# Patient Record
Sex: FEMALE | Race: WHITE | Employment: PART TIME | URBAN - METROPOLITAN AREA
[De-identification: names, ages, dates, MRNs, and addresses within clinical notes are randomized per-mention and may not be internally consistent; named-entity substitution may affect disease eponyms.]

---

## 2020-02-25 ENCOUNTER — OFFICE VISIT (OUTPATIENT)
Dept: SURGERY | Age: 30
End: 2020-02-25

## 2020-02-25 VITALS
BODY MASS INDEX: 47.99 KG/M2 | HEIGHT: 62 IN | SYSTOLIC BLOOD PRESSURE: 113 MMHG | TEMPERATURE: 98.3 F | WEIGHT: 260.8 LBS | HEART RATE: 93 BPM | DIASTOLIC BLOOD PRESSURE: 77 MMHG | OXYGEN SATURATION: 95 %

## 2020-02-25 DIAGNOSIS — E28.2 POLYCYSTIC OVARIAN DISEASE: ICD-10-CM

## 2020-02-25 DIAGNOSIS — G47.33 OBSTRUCTIVE SLEEP APNEA (ADULT) (PEDIATRIC): ICD-10-CM

## 2020-02-25 DIAGNOSIS — E66.01 OBESITY, MORBID (HCC): Primary | ICD-10-CM

## 2020-02-25 RX ORDER — METFORMIN HYDROCHLORIDE 500 MG/1
TABLET ORAL 2 TIMES DAILY WITH MEALS
COMMUNITY
End: 2020-11-24

## 2020-02-25 NOTE — PROGRESS NOTES
tial Consultation for Bariatric Surgery Template     Warren Payne is a 82-year-old white female who presents for surgical evaluation regard to definitive surgical management of her clinically severe obesity. Onset obesity: Childhood  Weight at age 27: 180 pounds on a 5 foot 20 frame. Maximum weight/current weight: 261 pounds on a 5 foot 2 inch frame with a body mass index of 48  Pattern/progression of weight gain: Slowly progressive interrupted by dietary weight loss followed by regain of the lost weight as well as additional weight thus exhibiting the yoyo effect after current maximum weight of 261 pounds. Maximum medical weight loss attempts: Multiple unsupervised and supervised weight loss trials with maximal loss occurring 2017 losing 20 pounds over a 3-month interval  Comorbidities: Clinical obstructive sleep apnea, carpal tunnel syndrome, polycystic ovarian syndrome, weight related arthropathy-back, knees  Current weight: 261. Body mass index 48. Ideal body weight: 125 pounds  Excess body weight 136 pounds  Postsurgical weight loss based on 80% loss of excess body weight: 107. Postsurgical goal weight: 154 pounds  Allergies: No known drug allergies. Current medications: See medication list  Past medical history:  1. Clinically severe obesity with body mass index of 48 and obese related comorbidities of clinical obstructive sleep apnea, carpal tunnel syndrome, polycystic ovarian syndrome, and weight related arthropathy-back, knees  2. History of gestational diabetes  Past surgical history:  1. Tonsillectomy 2018. 2.   section 2016. Social history:  Denies utilization of tobacco.  Alcohol: 1 ounce weekly  Family history:   Mother 52-clinically severe obesity, adult onset diabetes mellitus  Father 53-history of abdominal malignancy of unknown type  Sister 22, 28-clinically severe obesity  Brother 30-clinically severe obesity    Allergies not on file    Current Outpatient Medications Medication Sig Dispense Refill    metFORMIN (GLUCOPHAGE) 500 mg tablet Take  by mouth two (2) times daily (with meals). History reviewed. No pertinent past medical history. Past Surgical History:   Procedure Laterality Date    HX  SECTION      HX TONSIL AND ADENOIDECTOMY         Social History     Tobacco Use    Smoking status: Never Smoker    Smokeless tobacco: Never Used   Substance Use Topics    Alcohol use: Yes     Comment: socially    Drug use: Never       Family History   Problem Relation Age of Onset    Diabetes Mother    Hugo Leodan Cancer Father        Family Status   Relation Name Status    Mother  Alive    Father  Alive        stomach    Sister  Alive    Brother  Alive    Sister  Alive       Review of Systems:      General: Denies fevers, chills, night sweats, fatigue, weight loss, or weight gain.     HEENT: Denies changes in auditory or visual acuity, recurrent pharyngitis, epistaxis, chronic rhinorrhea, vertigo    Respiratory: Denies increasing shortness of breath, productive cough, hemoptysis    Cardiac: Denies known history of cardiac disease, heart murmur, palpitations    GI: Denies dysphagia, recurrent emesis, hematemesis, changes in bowel habits, hematochezia, melena    : Denies hematuria frequency urgency dysuria    Musculoskeletal: Denies fractures, dislocations    Neurologic: Denies history of CVA, paralysis paresthesias, recurrent cephalgia, seizures    Endocrine: Denies polyuria, polydipsia, polyphagia, heat and cold intolerance    Lymph/heme: Denies a history of malignancy, anemia, bruising, blood transfusions    Integumentary: Negative for dermatitis         Physical Exam    Visit Vitals  /77 (BP 1 Location: Left arm, BP Patient Position: Sitting)   Pulse 93   Temp 98.3 °F (36.8 °C)   Ht 5' 2\" (1.575 m)   Wt 118.3 kg (260 lb 12.8 oz)   LMP 2019 (Exact Date)   SpO2 95%   BMI 47.70 kg/m²       Pre op weight: 260  EBW: 135  Wt loss to date: -0.8     General: Clinically severely obese in no acute distress, nontoxic in appearance. Head: Normocephalic, atraumatic  Mouth: Clear, no overt lesions, oral mucosa is pink and moist.  Neck: Supple, no masses, no adenopathy or carotid bruits, trachea midline  Resp: Clear to auscultation bilaterally, no wheezing, rhonchi, or rales, excursions normal and symmetrical.  Cardio: Regular rate and rhythm, no murmurs, clicks, gallops, or rubs. Abdomen: Obese, soft, nontender, nondistended, normoactive bowel sounds, no hernias. Extremities: Warm, well perfused, no tenderness or swelling, normal gait/station, without edema or varicosities  Neuro: Sensation and strength grossly intact and symmetrical.  Psych: Alert and oriented to person, place, and time. Impression:       82-year-old white female with a body mass index of 48 and obese related comorbidities clinical obstructive sleep apnea, polycystic ovarian syndrome, carpal tunnel syndrome, weight related arthropathy-back, knees who would benefit from bariatric surgery. We have had an extensive discussion with regard to the risks, benefits and likely outcomes of the operation. We've discussed the restrictive and malabsorptive nature of the gastric bypass and compared and contrasted with the sleeve gastrectomy. The patient understands the likelihood of losing approximately 80% of their excess weight in 12 to 18 months. The patient also understands the risks including but not limited to bleeding, infection, need for reoperation, ulcers, leaks and strictures, bowel obstruction secondary to adhesions and internal hernias, DVT, PE, heart attack, stroke, and death. Patient also understands risks of inadequate weight loss, excess weight loss, vitamin insufficiency, protein malnutrition, excess skin, and loss of hair.   We have reviewed the components of a successful postoperative course including requirement for a high protein, low carbohydrate diet, 60 oz a day of zero calorie liquids, daily vitamin supplementation, daily exercise, regular follow-up, and participation in support groups.  At this time we will enroll the patient in our bariatric program, undertake routine laboratory evaluation, chest X-ray, EKG, possible UGI and evaluation by  nutritionist as well as psychologist and pending their satisfactory completion of the preop evaluation, plan to pursue laparoscopic potentially open Danielle-en-Y gastric bypass to achieve definitive durable weight loss on a personal level with expected resolution of obesity related comorbidities

## 2020-03-23 ENCOUNTER — DOCUMENTATION ONLY (OUTPATIENT)
Dept: SURGERY | Age: 30
End: 2020-03-23

## 2020-03-23 NOTE — PROGRESS NOTES
[facility] [address]  [place], [place]    Patient's Name: Mery Gay   Age: 34 y.o. YOB: 1990   Sex: female    Date: 03/03/2020    Session: 1 of 6  Surgeon:  Dr. Vitaliy Vogel    Height: 5'2\" Weight:    262      Lbs. Starting Weight: 260    Lbs. BMI: 47    Do you smoke? no    Alcohol intake:    I drink occasionally. Number of drinks at a time:  2/3  Number of times a month:1    Class Guidelines    Guidelines are reviewed with patient at the start of every class. 1. Patient understands that weight loss trial classes must be consecutive. Patient understands if they miss a class, it is their responsibility to contact me to reschedule class. I will reach out to patient after their first no show. 2.  Patient understands the expectations that weight maintenance/weight loss is expected during the classes. Failure to demonstrate changes may result in one extra month of weight loss trial, followed by going back to see the surgeon. Patient understands that they CAN NOT gain any weight during the weight loss trial.  Gaining weight will result in extra classes. 3. Patient is also instructed to be doing their labs, blood work, psych visit, support group and any other test that the surgeon has used while they are working on their weight loss trial.  4.  Patient was instructed to bring their blue binder to every class and appointment. Eating Habits and Behaviors    Today in class, we reviewed the key diet principles. I have talked to patient about pushing the fluid and working towards 64 ounces per day. Patient was given ideas of liquids that would be okay. Patient was encouraged to cut out liquid calories, such as soda and sweet tea. We talked about the reasons that sugar sweetened beverages can promote weight gain. Sugar is highly palatable.   Excessive consumption of sugar can trigger an exaggerated release of dopamine, which can promote a compulsive drive to consume more sugar sweetened beverages. Also, satiety is not reached with liquid calories the same way it does with solid calories. In class, we also talked about focusing on protein and low carbohydrates. Patient was encouraged during the weight loss trial to keep their carbohydrate less than 100 grams per day and their protein level at 60-80 grams per day. We talked about meal choices and snack ideas. Patient's current diet habits include:   3 meals consisting of a protein, starch, vegetables and snacks. Physical Activity/Exercise    Comments: We talked about exercise. Patient was given reasons of why exercise is so important and how that can help with their long-term success. I have encouraged patient to get a support system to help with the activity. Currently for activity, patient is walking . Behavior Modification       Comments:  During today's lesson, I also spent some time talking about behavior changes. I talked to patient about the importance of taking vitamins post op and we reviewed the vitamins that patients will be taking post op. Patient will hear this again at pre op class before surgery. Patient had the opportunity to ask questions about these vitamins that will be lifelong. Goals that patient wants to work on includes:  Exercise more.       Sahil Hernandez RD

## 2020-04-06 ENCOUNTER — DOCUMENTATION ONLY (OUTPATIENT)
Dept: SURGERY | Age: 30
End: 2020-04-06

## 2020-04-13 ENCOUNTER — VIRTUAL VISIT (OUTPATIENT)
Dept: SURGERY | Age: 30
End: 2020-04-13

## 2020-04-13 VITALS — HEIGHT: 62 IN | BODY MASS INDEX: 47.48 KG/M2 | WEIGHT: 258 LBS

## 2020-04-13 DIAGNOSIS — Z01.811 PRE-OP CHEST EXAM: Primary | ICD-10-CM

## 2020-04-13 DIAGNOSIS — G47.33 OBSTRUCTIVE SLEEP APNEA (ADULT) (PEDIATRIC): ICD-10-CM

## 2020-04-13 DIAGNOSIS — E66.01 OBESITY, MORBID (HCC): ICD-10-CM

## 2020-04-13 NOTE — PROGRESS NOTES
Ulises Lester is a 34 y.o. female who presents today with   Chief Complaint   Patient presents with    Morbid Obesity     Mid trial        Pt needs to get labs drawn, attend a support group and schedule an evaluation with Dr. Andrew Mccauley. Body mass index is 47.19 kg/m².   Goal wait is 152lbs

## 2020-04-13 NOTE — PROGRESS NOTES
Consent:  Dale Ingram. Kala Yuan  and/or her healthcare decision maker is aware that this patient-initiated Telehealth encounter is a billable service, with coverage as determined by her insurance carrier. She is aware that she may receive a bill and has provided verbal consent to proceed: Yes    Services were provided through a video synchronous discussion virtually to substitute for in-person clinic visit. Pursuant to the emergency declaration under the [facility], [facility] waiver authority and the [employer] Act, this Virtual  Visit was conducted, with patient's consent, to reduce the patient's risk of exposure to COVID-19 and provide continuity of care for an established patient. Provider location while conducting visit: in the office   Patient location: Home  Other person participating in the telehealth services: LEO Calvillo  This virtual visit was conducted via interactive/real-time audio/video using [employer] Me   Visit start: 1400  Visit end: 2444            Bariatric Preoperative Progress Note      Subjective:     Magali Salter is a 34 y.o. female who presents today for followup of their candidacy for bariatric surgery. Since last seen, Magali Salter has been working through bariatric program towards GBP. Past Medical History:   Diagnosis Date    Carpal tunnel syndrome     PCOS (polycystic ovarian syndrome)        Past Surgical History:   Procedure Laterality Date    HX  SECTION      HX  SECTION  2016    HX TONSIL AND ADENOIDECTOMY      HX TONSILLECTOMY         Current Outpatient Medications   Medication Sig Dispense Refill    metFORMIN (GLUCOPHAGE) 500 mg tablet Take  by mouth two (2) times daily (with meals).          No Known Allergies    ROS:  Review of Systems:  Positive in BOLD    CONST: Fever, weight loss, fatigue or chills  GI: Nausea, vomiting, abdominal pain, change in bowel habits, hematochezia, melena, and GERD   INTEG: Dermatitis, abnormal moles  HEENT: Recent changes in vision, vertigo, epistaxis, dysphagia and hoarseness  CV: Chest pain, palpitations, HTN, edema and varicosities  RESP: Cough, shortness of breath, wheezing, hemoptysis, snoring and reactive airway disease  : Hematuria, dysuria, frequency, urgency, nocturia and stress urinary incontinence   MS: Weakness, joint pain and arthritis  ENDO: Diabetes, thyroid disease, polyuria, polydipsia, polyphagia, poor wound healing, heat intolerance, cold intolerance  LYMPH/HEME: Anemia, bruising and history of blood transfusions  NEURO: Dizziness, headache, fainting, seizures and stroke  PSYCH: Anxiety and depression      Objective:     Physical Exam:  Visit Vitals  Ht 5' 2\" (1.575 m)   Wt 117 kg (258 lb)   BMI 47.19 kg/m²       Physical Exam  Vitals signs reviewed. Constitutional:       Appearance: She is obese. HENT:      Head: Normocephalic and atraumatic. Cardiovascular:      Rate and Rhythm: Normal rate. Pulmonary:      Effort: Pulmonary effort is normal.   Neurological:      Mental Status: She is alert and oriented to person, place, and time. Psychiatric:         Mood and Affect: Mood and affect normal.         Studies to date:    Labs: needs to be preformed/obtained     EKG: needs to be preformed/obtained     Nutritional evaluation: 2/6    Psychiatric evaluation: needs to be preformed/obtained     Support Group: needs to be preformed/obtained     Additional evaluations:  CXR: needs to be preformed/obtained     PAP results: pending records       Assessment:   Alma Alberto is a 34 y.o. female who is progressing through the bariatric preoperative evaluation. Plan:   -complete remainder of preop evaluation   - patient communicates understanding that the expectation is to lose or maintain weight during WLT.   Weight gain may result in delay or cancellation of surgery.   -Follow up once has completed entirety of weight loss workup to determine next steps.           Lashae Espinoza, FNP-BC

## 2020-05-05 ENCOUNTER — DOCUMENTATION ONLY (OUTPATIENT)
Dept: SURGERY | Age: 30
End: 2020-05-05

## 2020-05-05 NOTE — PROGRESS NOTES
[facility] [address] [address]  [place], [place]    Patient's Name: Heather Marie   Age: 34 y.o. YOB: 1990   Sex: female    Date:  05/05/2020           Session: 3 of  6   Surgeon:  Dr. Raymond Calabrese    Height: 5' 2 \"   Weight:    257     Lbs. Starting Weight: 260 Lbs  BMI: 47     Do you smoke? no    Alcohol intake:    I drink occasionally. Number of drinks at a time:  2/3  Number of times a month: 1    Class Guidelines    Guidelines are reviewed with patient at the start of every class. 1. Patient understands that weight loss trial classes must be consecutive. Patient understands if they miss a class, it is their responsibility to contact me to reschedule class. I will reach out to patient after their first no show. 2.  Patient understands the expectations that weight maintenance/weight loss is expected during the classes. Failure to demonstrate changes may result in one extra month of weight loss trial, followed by going back to see the surgeon. Patient understands that they CAN NOT gain any weight during the weight loss trial.  Gaining weight will result in extra classes. 3. Patient is also instructed to be doing their labs, blood work, psych visit, support group and any other test that the surgeon has used while they are working on their weight loss trial.  4.  Patient was instructed to bring their blue binder to every class and appointment. Changes Made Since Last Class:   Cutting out all sugary drinks. .    Eating Habits and Behaviors      Today in class we talked about the key diet principles. We start off each class talking about these principles, which include cutting out liquid calories and focusing on water or other non-calorie, non-carbonated drinks. We also spent time talking about carbohydrates, including foods that have carbohydrates and the goal to keep daily carbohydrates under 100 grams per day. Patient was given ideas of meal and snack choices that are lower in carbohydrates and focus more on protein. Patient was encouraged to start trying protein shakes and was given a list of suggestions. The main topic of class today was: Portion Control. We reviewed in class a power point filled with tips on ways to control portions, including using smaller plates, boxing up portions at a restaurant before starting to eat, and not eating from the container, but rather portioning snacks into smaller bags. Patient's were encouraged to food journal, which helps increase awareness of what and how much they are eating. It was emphasized to patient the importance of reading labels and portion sizes, but also applying these portion sizes. Patient was given a list of items that can help to make portion control easier. For example, a deck of cards or a palm of a hand is a proper portion of meat, a fist is a cup or a proper serving of vegetables. Patient was given 10 tips to help with the portion control. Patient's current diet habits include:   Patients daily intake is composed of 2 meals consisting of a protein source, starch, vegetables and occasional snacks of popcorn, peanuts, peanut butter crackers. .        Physical Activity/Exercise    Comments:     Currently for exercise, patient is walking more. Patient was given a list of ideas for activity and was encouraged to incorporate 30 minutes a day into their daily routine. Behavior Modification       Comments: In class, we also focused on the behavior aspects of weight management. This includes being a mindful eater and not eating in front of the TV. Patient is also encouraged to take 20 minutes to eat a meal and eat at a table. One goal for next month includes:  1.   Start to eat breakfast.       Adela Lujan RD

## 2020-06-02 ENCOUNTER — DOCUMENTATION ONLY (OUTPATIENT)
Dept: SURGERY | Age: 30
End: 2020-06-02

## 2020-06-02 ENCOUNTER — CLINICAL SUPPORT (OUTPATIENT)
Dept: SURGERY | Age: 30
End: 2020-06-02

## 2020-06-02 DIAGNOSIS — Z01.811 PRE-OP CHEST EXAM: Primary | ICD-10-CM

## 2020-06-02 DIAGNOSIS — Z01.818 PREOP TESTING: ICD-10-CM

## 2020-06-02 NOTE — PROGRESS NOTES
[facility]  [address], [address] [facility], [facility]    Patient's Name: Sheryle Sill   Age: 34 y.o. YOB: 1990   Sex: female    Date: 06/02/2020    Session: 4 of  6  Surgeon:  Dr. Scarlet Prader      Height: 5'2\" Weight:    259      Lbs. Starting Weight: 260  Lbs    BMI: 47      Do you smoke? no    Alcohol intake:    I drink occasionally:    Number of drinks at a time:  2/3  Number of times a month: 1    Class Guidelines    Guidelines are reviewed with patient at the start of every class. 1. Patient understands that weight loss trial classes must be consecutive. Patient understands if they miss a class, it is their responsibility to contact me to reschedule class. I will reach out to patient after their first no show. 2.  Patient understands the expectations that weight maintenance/weight loss is expected during the classes. Failure to demonstrate changes may result in one extra month of weight loss trial, followed by going back to see the surgeon. Patient understands that they CAN NOT gain any weight during the weight loss trial.  Gaining weight will result in extra classes. 3. Patient is also instructed to be doing their labs, blood work, psych visit, support group and any other test that the surgeon has used while they are working on their weight loss trial.  4.  Patient was instructed to bring their blue binder to every class and appointment. Changes Made Since Last Class:   Eating breakfast daily. Eating Habits and Behaviors    Today in class, I reviewed a hand out with meal ideas for better planning. We also discussed Nutrition, Behavior, and Exercise changes to start working on. Some of the eating behaviors that we discussed included the importance of eating breakfast and a list of sample breakfast foods was provided. We also talked about avoiding liquid calories.   Patient is encouraged to aim for 64 ounces of fluid per day and trying to get them from water, tea bags and water infuser tools. Patient's current diet habits include:   3 meals consisting of a protein source, carbohydrates, vegetables and occasional snacks of vegetables. Physical Activity/Exercise    Comments: We talked about exercise. Patient was given reasons of why exercise is so important and how that can help with their long-term success. I have encouraged patient to get a support system to help with the activity. Currently for activity, patient is doing more walking. Behavior Modification       Comments: We also talked about behavior modifications. We talked about eating triggers, such as eating in front of the TV and solutions, such as making the TV a no eating zone. If patient is eating out out of emotion, food will only temporarily solve that. Patient is encouraged to HALT and assess if they are eating because they are Hungry, or out of emotions: Anxious, Lonely, Tired, which the food will only temporarily solve. We also talked about ways to prevent relapse. Goals that patient wants to work on includes:  1. Swim more. 2. Decrease fast food.       Harleen Heart RD

## 2020-07-01 ENCOUNTER — DOCUMENTATION ONLY (OUTPATIENT)
Dept: SURGERY | Age: 30
End: 2020-07-01

## 2020-07-13 NOTE — PROGRESS NOTES
[facility]  [id number] [place] [address], [address] [place] [address] 13 [facility], [address]    Patient's Name: Aisha Mcnally   Age: 34 y.o. YOB: 1990   Sex: female    Date:   07/01/2020           Session: 5 of  6   Surgeon:  Dr. Chari Luna     Height: 5'2\"   Weight:    256      Lbs. Starting Weight:   260   Lbs. BMI: 47       Do you smoke? no    Alcohol intake:    I drink occasionally  Number of drinks at a time:  2-3  Number of times a week: 1    Class Guidelines    Guidelines are reviewed with patient at the start of every class. 1. Patient understands that weight loss trial classes must be consecutive. Patient understands if they miss a class, it is their responsibility to contact me to reschedule class. I will reach out to patient after their first no show. 2.  Patient understands the expectations that weight maintenance/weight loss is expected during the classes. Failure to demonstrate changes may result in one extra month of weight loss trial, followed by going back to see the surgeon. Patient understands that they CAN NOT gain any weight during the weight loss trial.  Gaining weight will result in extra classes. 3. Patient is also instructed to be doing their labs, blood work, psych visit, support group and any other test that the surgeon has used while they are working on their weight loss trial.  4.  Patient was instructed to bring their blue binder to every class and appointment. Changes Made Since Last Class:   started to count carbs. Eating Habits and Behaviors    Today in class, we started talking about the key diet principles. We first focused on stopping liquid calories. Patient was also educated on carbohydrates. Patient was instructed to start cutting out bread, rice, and pasta from the diet and start focusing more on meat and vegetables. I then gave a power point, which focused on Label Reading.   In class, I gave patients a labels and we worked through a series of questions to help patients have a better understanding of label reading. Patient was instructed to review the serving size. Patient was encouraged to focus on protein and carbohydrates. We also did a few label reading activities to help the patient become more familiar with label reading. Patient's current diet habits include:   3 meals consisting of a a protein source, a starch option and sometimes a vegetable options. For snacks the patient often chooses berries, strawberries, PB crackers. .        Physical Activity/Exercise    Comments: We talked about exercise. Patient was given reasons of why exercise is so important and how that can help with their long-term success. I have encouraged patient to get a support system to help with the activity. Currently for activity, patient is doing more walking. Behavior Modification       Comments:  Behavior modifications were reinforced. This included not eating in front of the TV, which could lead to bigger portions and eating when one is not hungry. We also talked about the importance of eating 3 meals per day. Patient was encouraged to food journal to keep their daily carbohydrates less than 30 grams per meal.      Goals that patient wants to work on includes:  1. Increase water.       Pati Vu RD

## 2020-08-05 ENCOUNTER — DOCUMENTATION ONLY (OUTPATIENT)
Dept: SURGERY | Age: 30
End: 2020-08-05

## 2020-08-05 NOTE — PROGRESS NOTES
[facility]  [id number] [place] [address], [address] [place] [address], [address]    Patient's Name: Brennan Plunkett   Age: 34 y.o. YOB: 1990   Sex: female    Date:   8/5/2020    Session: 6 of  6   Surgeon:  Dr. Charmayne Burr       Height: 5'2\" Weight:    257      Lbs. Starting Weight: 260   Lbs. BMI:  47      Do you smoke? no    Alcohol intake:    I do not drink at all. Class Guidelines    Guidelines are reviewed with patient at the start of every class. 1. Patient understands that weight loss trial classes must be consecutive. Patient understands if they miss a class, it is their responsibility to contact me to reschedule class. I will reach out to patient after their first no show. 2.  Patient understands the expectations that weight maintenance/weight loss is expected during the classes. Failure to demonstrate changes may result in one extra month of weight loss trial, followed by going back to see the surgeon. Patient understands that they CAN NOT gain any weight during the weight loss trial.  Gaining weight will result in extra classes. 3. Patient is also instructed to be doing their labs, blood work, psych visit, support group and any other test that the surgeon has used while they are working on their weight loss trial.  4.  Patient was instructed to bring their blue binder to every class and appointment. Changes Made Since Last Class:   Getting all her water in. Eating Habits and Behaviors    Today in class, we reviewed the key diet principles. I have talked to patient about pushing the fluid and working towards 64 ounces per day. We focused on following a low-calorie diet. Patient was instructed to count their carbohydrates and try to keep their daily intake under 75 grams per day and try to keep their daily protein at 80 grams per day. Patient was given examples of carbohydrates in starches.   Patient was encouraged to focus on meat and vegetables and begin cutting carbohydrates out. We talked about foods that are protein-based and how to incorporate those into their meals. I also reviewed with patient the importance of eating 3 meals per day and suggestions were made for breakfast items. Patient's current diet habits include:   3 meals consisting of a protein source, sometimes vegetables and sometimes a starch item. For snacks patient is choosing hard boiled eggs, cheese, peanuts. Physical Activity/Exercise    Comments: We talked about exercise. Patient was given reasons of why exercise is so important and how that can help with their long-term success. I have encouraged patient to get a support system to help with the activity. Currently for activity, patient is doing more walking. Behavior Modification       Comments:  During today's lesson, I gave a presentation called The 100-200 Calorie \"Mindless Margin. \"  The goal is to make modest daily 100-200 calorie reductions in certain things that the body won't notice. One, 100-200 calorie change and would will look 10-20 pounds in one year. An example could be cutting soda. Patient was given a check off list and was encouraged to come up with 1-3 100 calories changes they could make. The check off list is a daily tracker to see if these goals are being met. Goals that patient wants to work on includes:  1. Move more.         Melina Sicard, RD

## 2020-08-13 ENCOUNTER — HOSPITAL ENCOUNTER (OUTPATIENT)
Dept: GENERAL RADIOLOGY | Age: 30
Discharge: HOME | End: 2020-08-13
Payer: COMMERCIAL

## 2020-08-13 ENCOUNTER — HOSPITAL ENCOUNTER (OUTPATIENT)
Dept: LAB | Age: 30
Discharge: HOME | End: 2020-08-13
Payer: COMMERCIAL

## 2020-08-13 ENCOUNTER — DOCUMENTATION ONLY (OUTPATIENT)
Dept: SURGERY | Age: 30
End: 2020-08-13

## 2020-08-13 DIAGNOSIS — E66.01 OBESITY, MORBID (HCC): ICD-10-CM

## 2020-08-13 DIAGNOSIS — Z01.811 PRE-OP CHEST EXAM: ICD-10-CM

## 2020-08-13 LAB
25(OH)D3 SERPL-MCNC: 41 NG/ML (ref 30–100)
ALBUMIN SERPL-MCNC: 3.8 G/DL (ref 3.4–5)
ALBUMIN/GLOB SERPL: 1 {RATIO} (ref 0.8–1.7)
ALP SERPL-CCNC: 60 U/L (ref 45–117)
ALT SERPL-CCNC: 25 U/L (ref 13–56)
ANION GAP SERPL CALC-SCNC: 7 MMOL/L (ref 3–18)
APPEARANCE UR: CLEAR
AST SERPL-CCNC: 14 U/L (ref 10–38)
BACTERIA URNS QL MICRO: ABNORMAL /HPF
BASOPHILS # BLD: 0 K/UL (ref 0–0.1)
BASOPHILS NFR BLD: 0 % (ref 0–2)
BILIRUB SERPL-MCNC: 0.4 MG/DL (ref 0.2–1)
BILIRUB UR QL: NEGATIVE
BUN SERPL-MCNC: 13 MG/DL (ref 7–18)
BUN/CREAT SERPL: 16 (ref 12–20)
CALCIUM SERPL-MCNC: 9.3 MG/DL (ref 8.5–10.1)
CHLORIDE SERPL-SCNC: 107 MMOL/L (ref 100–111)
CHOLEST SERPL-MCNC: 229 MG/DL
CO2 SERPL-SCNC: 25 MMOL/L (ref 21–32)
COLOR UR: YELLOW
CREAT SERPL-MCNC: 0.81 MG/DL (ref 0.6–1.3)
DIFFERENTIAL METHOD BLD: NORMAL
EOSINOPHIL # BLD: 0 K/UL (ref 0–0.4)
EOSINOPHIL NFR BLD: 1 % (ref 0–5)
EPITH CASTS URNS QL MICRO: ABNORMAL /LPF (ref 0–5)
ERYTHROCYTE [DISTWIDTH] IN BLOOD BY AUTOMATED COUNT: 12 % (ref 11.6–14.5)
FOLATE SERPL-MCNC: 17.7 NG/ML (ref 3.1–17.5)
GLOBULIN SER CALC-MCNC: 3.9 G/DL (ref 2–4)
GLUCOSE SERPL-MCNC: 105 MG/DL (ref 74–99)
GLUCOSE UR STRIP.AUTO-MCNC: NEGATIVE MG/DL
HCT VFR BLD AUTO: 42.7 % (ref 35–45)
HDLC SERPL-MCNC: 39 MG/DL (ref 40–60)
HDLC SERPL: 5.9 {RATIO} (ref 0–5)
HGB BLD-MCNC: 14.4 G/DL (ref 12–16)
HGB UR QL STRIP: NEGATIVE
IRON SERPL-MCNC: 74 UG/DL (ref 50–175)
KETONES UR QL STRIP.AUTO: ABNORMAL MG/DL
LDLC SERPL CALC-MCNC: 165.6 MG/DL (ref 0–100)
LEUKOCYTE ESTERASE UR QL STRIP.AUTO: NEGATIVE
LIPID PROFILE,FLP: ABNORMAL
LYMPHOCYTES # BLD: 1.8 K/UL (ref 0.9–3.6)
LYMPHOCYTES NFR BLD: 28 % (ref 21–52)
MCH RBC QN AUTO: 29.3 PG (ref 24–34)
MCHC RBC AUTO-ENTMCNC: 33.7 G/DL (ref 31–37)
MCV RBC AUTO: 86.8 FL (ref 74–97)
MONOCYTES # BLD: 0.3 K/UL (ref 0.05–1.2)
MONOCYTES NFR BLD: 5 % (ref 3–10)
NEUTS SEG # BLD: 4.2 K/UL (ref 1.8–8)
NEUTS SEG NFR BLD: 66 % (ref 40–73)
NITRITE UR QL STRIP.AUTO: NEGATIVE
PH UR STRIP: 5.5 [PH] (ref 5–8)
PLATELET # BLD AUTO: 224 K/UL (ref 135–420)
PMV BLD AUTO: 11.1 FL (ref 9.2–11.8)
POTASSIUM SERPL-SCNC: 4.2 MMOL/L (ref 3.5–5.5)
PROT SERPL-MCNC: 7.7 G/DL (ref 6.4–8.2)
PROT UR STRIP-MCNC: NEGATIVE MG/DL
RBC # BLD AUTO: 4.92 M/UL (ref 4.2–5.3)
RBC #/AREA URNS HPF: NEGATIVE /HPF (ref 0–5)
SODIUM SERPL-SCNC: 139 MMOL/L (ref 136–145)
SP GR UR REFRACTOMETRY: 1.02 (ref 1–1.03)
TRIGL SERPL-MCNC: 122 MG/DL (ref ?–150)
TSH SERPL DL<=0.05 MIU/L-ACNC: 0.76 UIU/ML (ref 0.36–3.74)
UROBILINOGEN UR QL STRIP.AUTO: 0.2 EU/DL (ref 0.2–1)
VIT B12 SERPL-MCNC: 374 PG/ML (ref 211–911)
VLDLC SERPL CALC-MCNC: 24.4 MG/DL
WBC # BLD AUTO: 6.3 K/UL (ref 4.6–13.2)
WBC URNS QL MICRO: ABNORMAL /HPF (ref 0–4)

## 2020-08-13 PROCEDURE — 86677 HELICOBACTER PYLORI ANTIBODY: CPT

## 2020-08-13 PROCEDURE — 80061 LIPID PANEL: CPT

## 2020-08-13 PROCEDURE — 83540 ASSAY OF IRON: CPT

## 2020-08-13 PROCEDURE — 82607 VITAMIN B-12: CPT

## 2020-08-13 PROCEDURE — 71046 X-RAY EXAM CHEST 2 VIEWS: CPT

## 2020-08-13 PROCEDURE — 84425 ASSAY OF VITAMIN B-1: CPT

## 2020-08-13 PROCEDURE — 82306 VITAMIN D 25 HYDROXY: CPT

## 2020-08-13 PROCEDURE — 80053 COMPREHEN METABOLIC PANEL: CPT

## 2020-08-13 PROCEDURE — 81001 URINALYSIS AUTO W/SCOPE: CPT

## 2020-08-13 PROCEDURE — 85025 COMPLETE CBC W/AUTO DIFF WBC: CPT

## 2020-08-13 PROCEDURE — 84443 ASSAY THYROID STIM HORMONE: CPT

## 2020-08-13 PROCEDURE — 93005 ELECTROCARDIOGRAM TRACING: CPT

## 2020-08-13 NOTE — PROGRESS NOTES
Patient visits RD for a weight check weighing 263#. Her initial weight with surgeon is 260#(02/25/2020). She will be contacting us for her next weight check.

## 2020-08-14 ENCOUNTER — TELEPHONE (OUTPATIENT)
Dept: SURGERY | Age: 30
End: 2020-08-14

## 2020-08-14 DIAGNOSIS — E66.01 OBESITY, MORBID (HCC): Primary | ICD-10-CM

## 2020-08-14 DIAGNOSIS — Z01.818 PREOPERATIVE EXAMINATION: ICD-10-CM

## 2020-08-14 LAB
ATRIAL RATE: 86 BPM
CALCULATED P AXIS, ECG09: 56 DEGREES
CALCULATED R AXIS, ECG10: 46 DEGREES
CALCULATED T AXIS, ECG11: 2 DEGREES
DIAGNOSIS, 93000: NORMAL
P-R INTERVAL, ECG05: 146 MS
Q-T INTERVAL, ECG07: 374 MS
QRS DURATION, ECG06: 82 MS
QTC CALCULATION (BEZET), ECG08: 447 MS
VENTRICULAR RATE, ECG03: 86 BPM

## 2020-08-14 NOTE — TELEPHONE ENCOUNTER
Called pt regarding EKG results.  Per Dr. Gudelia Powell I advised the pt to repeat EKG test. Patient verbalized understanding        ----- Message from Good Cohn DO sent at 8/14/2020  1:26 PM EDT -----  Needs repeat EKG

## 2020-08-15 LAB — VIT B1 BLD-SCNC: 142.2 NMOL/L (ref 66.5–200)

## 2020-08-17 ENCOUNTER — HOSPITAL ENCOUNTER (OUTPATIENT)
Dept: LAB | Age: 30
Discharge: HOME | End: 2020-08-17
Payer: COMMERCIAL

## 2020-08-17 DIAGNOSIS — E66.01 OBESITY, MORBID (HCC): ICD-10-CM

## 2020-08-17 DIAGNOSIS — Z01.818 PREOPERATIVE EXAMINATION: ICD-10-CM

## 2020-08-17 LAB
H PYLORI IGG SER IA-ACNC: 0.1 INDEX VALUE (ref 0–0.79)
H PYLORI IGM SER-ACNC: <9 UNITS (ref 0–8.9)

## 2020-08-17 PROCEDURE — 93005 ELECTROCARDIOGRAM TRACING: CPT

## 2020-08-18 LAB
ATRIAL RATE: 77 BPM
CALCULATED P AXIS, ECG09: 52 DEGREES
CALCULATED R AXIS, ECG10: 38 DEGREES
CALCULATED T AXIS, ECG11: 3 DEGREES
DIAGNOSIS, 93000: NORMAL
P-R INTERVAL, ECG05: 150 MS
Q-T INTERVAL, ECG07: 374 MS
QRS DURATION, ECG06: 84 MS
QTC CALCULATION (BEZET), ECG08: 423 MS
VENTRICULAR RATE, ECG03: 77 BPM

## 2020-08-19 ENCOUNTER — TELEPHONE (OUTPATIENT)
Dept: SURGERY | Age: 30
End: 2020-08-19

## 2020-08-19 DIAGNOSIS — Z01.818 PREOPERATIVE EXAMINATION: Primary | ICD-10-CM

## 2020-08-19 DIAGNOSIS — E66.01 OBESITY, MORBID (HCC): ICD-10-CM

## 2020-08-19 NOTE — TELEPHONE ENCOUNTER
Placed referral to cardiologist.     Informed pt of abnormal EKG. I also let the pt know that Dr. Mercie Severs would like her to see a cardiologist for clearance.

## 2020-08-20 ENCOUNTER — DOCUMENTATION ONLY (OUTPATIENT)
Dept: SURGERY | Age: 30
End: 2020-08-20

## 2020-08-20 NOTE — PROGRESS NOTES
Nutrition Evaluation    Patient's Name: Wali Ross   Age: 31 y.o. YOB: 1990   Sex: female    Height: 5'2\" Weight: 257 BMI:  47  Starting Weight:  260#        Smoking Status:  no  I do not drink at all. Changes made during classes include:  Decreased carb intake. Two things that patient learned during this weight loss trial:  The importance of movement. The importance of good fluid intake. Summary:  I feel that Wali Ross has demonstrated appropriate diet changes and is ready to move forward with surgery. Patient has been briefed on the importance of the protein drinks, vitamins, and the transition of the diet stages. Patient understands that the long-term diet will focus on protein and vegetables. Patient understand the effects of carbohydrates after surgery and what reactive hypoglycemia is. Patient is aware that they will be attending pre-op class 2 weeks before surgery and will get more detailed information on the post-op diet guidelines. Patient will see me again at 6 weeks post-op. At this 6 week visit, RD will assess how patient is tolerating soft protein and advance to vegetables, if tolerating soft protein without difficulty. Patient will also see RD again at 9 months post-op. This visit will assess patient's compliance with current protocol, including diet, vitamins, protein shakes, and exercise. Post-op diet guidelines will be reinforced. RD is available for questions and to meet with patient outside of the 6 week and 9 month post-op visit. We spent a lot of time talking about the vitamins. Patient understands the importance of being compliant with the diet protocol and the complications and risks that can occur if they are non-compliant with the nutritional protocol. Patient has attended at least one support group.     Candidate for surgery:  Yes    Liseth Valiente RD  8/20/2020

## 2020-09-08 ENCOUNTER — TELEPHONE (OUTPATIENT)
Dept: SURGERY | Age: 30
End: 2020-09-08

## 2020-09-08 NOTE — TELEPHONE ENCOUNTER
Called patient she is not ready for pre op she needs clearance from her cardio she goes on 10/05  for clearance tht is the earliest the office has for a pre op

## 2020-10-05 ENCOUNTER — OFFICE VISIT (OUTPATIENT)
Dept: CARDIOLOGY CLINIC | Age: 30
End: 2020-10-05
Payer: COMMERCIAL

## 2020-10-05 VITALS
TEMPERATURE: 97.5 F | BODY MASS INDEX: 49.75 KG/M2 | DIASTOLIC BLOOD PRESSURE: 78 MMHG | OXYGEN SATURATION: 99 % | SYSTOLIC BLOOD PRESSURE: 108 MMHG | HEART RATE: 81 BPM | RESPIRATION RATE: 18 BRPM | WEIGHT: 272 LBS

## 2020-10-05 DIAGNOSIS — R94.31 ABNORMAL EKG: ICD-10-CM

## 2020-10-05 DIAGNOSIS — Z01.818 PRE-OP TESTING: ICD-10-CM

## 2020-10-05 DIAGNOSIS — R94.31 ABNORMAL EKG: Primary | ICD-10-CM

## 2020-10-05 PROCEDURE — 99204 OFFICE O/P NEW MOD 45 MIN: CPT | Performed by: INTERNAL MEDICINE

## 2020-10-05 NOTE — PROGRESS NOTES
Bessie Ashley presents today for   Chief Complaint   Patient presents with    Surgical Clearance       Bessie Ashley preferred language for health care discussion is english/other. Is someone accompanying this pt? n    Is the patient using any DME equipment during [address]? n    Depression Screening:  3 most recent PHQ Screens 10/5/2020   Little interest or pleasure in doing things Not at all   Feeling down, depressed, irritable, or hopeless Not at all   Total Score PHQ 2 0       Learning Assessment:  Learning Assessment 2/25/2020   PRIMARY LEARNER Patient   PRIMARY LANGUAGE ENGLISH   LEARNER PREFERENCE PRIMARY DEMONSTRATION   ANSWERED BY patient   RELATIONSHIP SELF       Abuse Screening:  Abuse Screening Questionnaire 2/25/2020   Are you in a relationship with someone who physically or mentally threatens you? N   Is it safe for you to go home? Y       Fall Risk  No flowsheet data found. Pt currently taking Anticoagulant therapy? n    Coordination of Care:  1. Have you been to the ER, urgent care clinic since your last visit? Hospitalized since your last visit? n    2. Have you seen or consulted any other health care providers outside of the [address] [place] [address] since your last visit? Include any pap smears or colon screening.  n

## 2020-10-05 NOTE — LETTER
10/5/20 Patient: Carol Gomez YOB: 1990 Date of Visit: 10/5/2020 Jesica Gillette MD 
[address] [facility] [address] [facility] [address] VIA In Basket Katja Wallace DO 
[address] [id number] VIA In Basket Dear MD Katja Wilson DO, Thank you for referring Ms. Daiana Arriaga to [facility] [address] [place] [address] for evaluation. My notes for this consultation are attached. If you have questions, please do not hesitate to call me. I look forward to following your patient along with you. Sincerely, Jeanie Sood MD

## 2020-10-05 NOTE — PROGRESS NOTES
[facility]    Ms. Vel Reeves is 59-year-old female with carpal tunnel syndrome, hyperlipidemia, morbid obesity, PCOS    Patient is here today for initial cardiac evaluation. Patient is considering gastric bypass surgery for her morbid obesity. Before surgery, she was asked to undergo EKG. EKG was abnormal so she was asked to come see me. Patient denies any chest pain or chest tightness. She denies any prior history of myocardial infarction or congestive heart failure. Patient denies any significant shortness of breath on a level ground however when she has to climb 2 or 3 flight of stairs, she gets short of breath. She denies presyncope or syncope. She denies PND or significant lower extremity swelling. Denies any nausea, vomiting, abdominal pain, fever, chills, sputum production. No hematuria or other bleeding complaints    Past Medical History:   Diagnosis Date    Carpal tunnel syndrome     HLD (hyperlipidemia)     Obesity     PCOS (polycystic ovarian syndrome)        Review of Systems:  Cardiac symptoms as noted above in HPI. All others negative. Denies fatigue, malaise, skin rash, joint pain, blurring vision, photophobia, neck pain, hemoptysis, chronic cough, nausea, vomiting, hematuria, burning micturition, BRBPR, chronic headaches. Current Outpatient Medications   Medication Sig    metFORMIN (GLUCOPHAGE) 500 mg tablet Take  by mouth two (2) times daily (with meals). No current facility-administered medications for this visit.         Past Surgical History:   Procedure Laterality Date    HX  SECTION      HX  SECTION      HX TONSIL AND ADENOIDECTOMY      HX TONSILLECTOMY         Allergies and Sensitivities:  No Known Allergies    Family History:  Family History   Problem Relation Age of Onset    Diabetes Mother     Cancer Father        Social History:  Social History     Tobacco Use    Smoking status: Never Smoker    Smokeless tobacco: Never Used   Substance Use Topics    Alcohol use: Yes     Comment: socially    Drug use: Never     She  reports that she has never smoked. She has never used smokeless tobacco.  She  reports current alcohol use. Physical Exam:  BP Readings from Last 3 Encounters:   10/05/20 108/78   02/25/20 113/77         Pulse Readings from Last 3 Encounters:   10/05/20 81   02/25/20 93          Wt Readings from Last 3 Encounters:   10/05/20 272 lb (123.4 kg)   04/13/20 258 lb (117 kg)   02/25/20 260 lb 12.8 oz (118.3 kg)       Constitutional: Oriented to person, place, and time. HENT: Head: Normocephalic and atraumatic. Neck: No JVD present. Carotid bruit is not appreciated. Cardiovascular: Regular rhythm. No murmur, gallop or rubs appreciated  Lung: Breath sounds normal. No respiratory distress. No ronchi or rales appreciated  Abdominal: No tenderness. No rebound and no guarding. Musculoskeletal: There is no lower extremity edema. No cynosis  Lymphadenopathy:  No cervical or supraclavicular adenopathy appriciated. Neurological: No gross motor deficit noted. Skin: No visible skin rash noted. No Ear discharge noted  Psychiatric: Normal mood and affect. Good distal pulse    LABS:   Lab Results   Component Value Date/Time    Sodium 139 08/13/2020 11:39 AM    Potassium 4.2 08/13/2020 11:39 AM    Chloride 107 08/13/2020 11:39 AM    CO2 25 08/13/2020 11:39 AM    Glucose 105 (H) 08/13/2020 11:39 AM    BUN 13 08/13/2020 11:39 AM    Creatinine 0.81 08/13/2020 11:39 AM     Lipids Latest Ref Rng & Units 8/13/2020   Chol, Total <200 MG/(H)   HDL 40 - 60 MG/DL 39(L)   LDL 0 - 100 MG/. 6(H)   Trig <150 MG/   Chol/HDL Ratio 0 - 5.0   5.9(H)     Lab Results   Component Value Date/Time    ALT (SGPT) 25 08/13/2020 11:39 AM     No results found for: HBA1C, HGBE8, PVG9JJOL, MSY6NBPJ  Lab Results   Component Value Date/Time    TSH 0.76 08/13/2020 11:39 AM EKG  Sinus rhythm. No ST changes of ischemia. T wave inversion in V1V3, likely juvenile pattern T wave inversion. ECHO    STRESS TEST (EST, PHARM, NUC, ECHO etc)    CATHETERIZATION    IMPRESSION & PLAN:  Ms. Thana Dandy is 28-year-old female with morbid obesity, PCOS    Hyperlipidemia:  Patient has hyperlipidemia with LDL level 165. Patient is considering gastric bypass surgery. Importance of diet and exercise discussed in detail. If no improvement, can consider medication in future  Discussed with patient    Obesity:  Patient weighs 272 pounds with BMI of 50. Patient is considering gastric bypass surgery. Preoperative evaluation:  Patient is considering gastric bypass surgery under general anesthesia. EKG showed T wave inversion in precordial lead and mentioned above. Because of her dyspnea with moderate exertion and EKG changes, recommend echocardiogram.  She does not have any unstable coronary symptoms. Echocardiogram to rule out structural abnormality of the heart or any pulmonary hypertension. If echo is unremarkable, she would not require any further cardiac work-up. Further plan depending on echo finding    This plan was discussed with patient who is in agreement. Thank you for allowing me to participate in patient care. Please feel free to call me if you have any question or concern. Denisse Hood MD  Please note: This document has been produced using voice recognition software. Unrecognized errors in transcription may be present.

## 2020-10-05 NOTE — PATIENT INSTRUCTIONS
Testing Echo Please call [facility] scheduling at [phone]  to schedule an appointment. All testing is completed at [address] [facility] **call office 5-7 days after testing for results**

## 2020-10-13 ENCOUNTER — HOSPITAL ENCOUNTER (OUTPATIENT)
Dept: NON INVASIVE DIAGNOSTICS | Age: 30
Discharge: HOME | End: 2020-10-13
Attending: INTERNAL MEDICINE
Payer: COMMERCIAL

## 2020-10-13 VITALS
HEIGHT: 62 IN | DIASTOLIC BLOOD PRESSURE: 78 MMHG | WEIGHT: 272 LBS | BODY MASS INDEX: 50.05 KG/M2 | SYSTOLIC BLOOD PRESSURE: 108 MMHG

## 2020-10-13 LAB
ECHO AO ASC DIAM: 2.64 CM
ECHO AO ROOT DIAM: 2.6 CM
ECHO IVC PROX: 1.77 CM
ECHO IVC SNIFF: 1.77 CM
ECHO LA AREA 2C: 16.17 CM2
ECHO LA AREA 4C: 16.5 CM2
ECHO LA MAJOR AXIS: 3.68 CM
ECHO LA MINOR AXIS: 1.69 CM
ECHO LA TO AORTIC ROOT RATIO: 1.42
ECHO LA VOL 2C: 46.8 ML (ref 22–52)
ECHO LA VOL 4C: 41.9 ML (ref 22–52)
ECHO LA VOL BP: 49.02 ML (ref 22–52)
ECHO LA VOL/BSA BIPLANE: 22.5 ML/M2 (ref 16–28)
ECHO LA VOLUME INDEX A2C: 21.48 ML/M2 (ref 16–28)
ECHO LA VOLUME INDEX A4C: 19.23 ML/M2 (ref 16–28)
ECHO LV E' LATERAL VELOCITY: 10.01 CM/S
ECHO LV E' SEPTAL VELOCITY: 7.43 CM/S
ECHO LV EDV A2C: 68.5 ML
ECHO LV EDV A4C: 116.8 ML
ECHO LV EDV BP: 96 ML (ref 56–104)
ECHO LV EDV INDEX A4C: 53.6 ML/M2
ECHO LV EDV INDEX BP: 44.1 ML/M2
ECHO LV EDV NDEX A2C: 31.4 ML/M2
ECHO LV EDV TEICHHOLZ: 0.64 ML
ECHO LV EJECTION FRACTION A2C: 50 %
ECHO LV EJECTION FRACTION A4C: 60 %
ECHO LV EJECTION FRACTION BIPLANE: 56.9 % (ref 55–100)
ECHO LV ESV A2C: 34.4 ML
ECHO LV ESV A4C: 46.7 ML
ECHO LV ESV BP: 41.4 ML (ref 19–49)
ECHO LV ESV INDEX A2C: 15.8 ML/M2
ECHO LV ESV INDEX A4C: 21.4 ML/M2
ECHO LV ESV INDEX BP: 19 ML/M2
ECHO LV ESV TEICHHOLZ: 0.33 ML
ECHO LV INTERNAL DIMENSION DIASTOLIC: 4.68 CM (ref 3.9–5.3)
ECHO LV INTERNAL DIMENSION SYSTOLIC: 3.53 CM
ECHO LV IVSD: 0.76 CM (ref 0.6–0.9)
ECHO LV MASS 2D: 122.4 G (ref 67–162)
ECHO LV MASS INDEX 2D: 56.2 G/M2 (ref 43–95)
ECHO LV POSTERIOR WALL DIASTOLIC: 0.85 CM (ref 0.6–0.9)
ECHO LVOT DIAM: 2.02 CM
ECHO LVOT PEAK GRADIENT: 3.01 MMHG
ECHO LVOT SV: 58.9 ML
ECHO LVOT VTI: 18.39 CM
ECHO MV A VELOCITY: 70.25 CM/S
ECHO MV E DECELERATION TIME (DT): 192.4 MS
ECHO MV E VELOCITY: 84.44 CM/S
ECHO MV E/A RATIO: 1.2
ECHO MV E/E' LATERAL: 8.44
ECHO MV E/E' RATIO (AVERAGED): 9.9
ECHO MV E/E' SEPTAL: 11.36
ECHO RA MINOR AXIS: 3.34 CM
ECHO RV TAPSE: 1.85 CM (ref 1.5–2)
LVFS 2D: 24.62 %
LVOT MG: 1.93 MMHG
LVOT MV: 0.67 CM/S
LVSV (MOD BI): 23.49 ML
LVSV (MOD SINGLE 4C): 30.16 ML
LVSV (MOD SINGLE): 14.68 ML
LVSV (TEICH): 21.34 ML
MV DEC SLOPE: 4.39

## 2020-10-13 PROCEDURE — 74011250636 HC RX REV CODE- 250/636: Performed by: INTERNAL MEDICINE

## 2020-10-13 PROCEDURE — 74011000250 HC RX REV CODE- 250: Performed by: INTERNAL MEDICINE

## 2020-10-13 PROCEDURE — C8929 TTE W OR WO FOL WCON,DOPPLER: HCPCS

## 2020-10-13 RX ADMIN — PERFLUTREN 1 ML: 6.52 INJECTION, SUSPENSION INTRAVENOUS at 09:28

## 2020-10-16 ENCOUNTER — TELEPHONE (OUTPATIENT)
Dept: CARDIOLOGY CLINIC | Age: 30
End: 2020-10-16

## 2020-10-16 NOTE — TELEPHONE ENCOUNTER
Contacted pt at [facility] number. Two patient Identifiers confirmed. Advised pt per Dr Jillian Cardenas. Pt verbalized understanding.

## 2020-10-16 NOTE — LETTER
10/16/2020 Ms. Jun Forrest [address] [address] To whom it may concern: 
 
Jun Forrest was seen in our office on October 5, 2020 for cardiac evaluation. From a cardiac standpoint she is low risk for her upcoming bariatric surgery. Please feel free to contact our office if you have any questions regarding this patient. Sincerely, Richard Hudson MD

## 2020-10-29 ENCOUNTER — OFFICE VISIT (OUTPATIENT)
Dept: SURGERY | Age: 30
End: 2020-10-29
Payer: COMMERCIAL

## 2020-10-29 VITALS
WEIGHT: 268 LBS | TEMPERATURE: 97.9 F | BODY MASS INDEX: 49.32 KG/M2 | HEIGHT: 62 IN | HEART RATE: 84 BPM | OXYGEN SATURATION: 96 % | DIASTOLIC BLOOD PRESSURE: 83 MMHG | SYSTOLIC BLOOD PRESSURE: 131 MMHG

## 2020-10-29 DIAGNOSIS — E66.01 OBESITY, MORBID (HCC): Primary | ICD-10-CM

## 2020-10-29 PROCEDURE — 99214 OFFICE O/P EST MOD 30 MIN: CPT | Performed by: SURGERY

## 2020-10-29 RX ORDER — ESCITALOPRAM OXALATE 10 MG/1
20 TABLET ORAL DAILY
COMMUNITY

## 2020-10-29 NOTE — H&P (VIEW-ONLY)
Preop History and Physical Exam: 
 
Marcin Salazar is a 31 y.o. white female initially evaluated in this office on 2020 for discussion of the surgical options available for the definitive management of a clinically severe obesity. The patient at that time weighed 261 pounds with a body mass index of 48 with obesity comorbidities of hypercholesterolemia, clinical obstructive sleep apnea bilateral carpal tunnel syndrome, polycystic ovarian syndrome and weight related arthropathyback, knees. The patient after discussing surgical options available for definitive weight loss is likely pursue laparoscopic Danielle-en-Y gastric bypass to achieve definitive durable weight loss on the personal level with expected resolution of obesity related comorbidities patient presents today in follow-up for review of the diagnostic evaluation and surgical scheduling noting no new medical or surgical history. The patient currently weighs 268 pounds with a body mass index of 49. Ideal body weight 125 pounds, excess body weight 138 pounds, estimated postsurgical weight loss 110 pounds, postsurgical goal weight-based 90% loss of excess body weight 153 pounds. Past Medical History:  
Diagnosis Date  Carpal tunnel syndrome  HLD (hyperlipidemia)  Obesity  PCOS (polycystic ovarian syndrome) Past Surgical History:  
Procedure Laterality Date  HX  SECTION    
 HX  SECTION    HX TONSIL AND ADENOIDECTOMY  HX TONSILLECTOMY Current Outpatient Medications Medication Sig Dispense Refill  escitalopram oxalate (Lexapro) 10 mg tablet Take 10 mg by mouth daily.  metFORMIN (GLUCOPHAGE) 500 mg tablet Take  by mouth two (2) times daily (with meals). No Known Allergies Social History Tobacco Use  Smoking status: Never Smoker  Smokeless tobacco: Never Used Substance Use Topics  Alcohol use: Not Currently Comment: socially  Drug use: Never Family History Problem Relation Age of Onset  Diabetes Mother  Cancer Father Review of Systems:  Positive in BOLD 
 
CONST: Fever, weight loss, fatigue or chills GI: Nausea, vomiting, abdominal pain, change in bowel habits, hematochezia, melena, and GERD INTEG: Dermatitis, abnormal moles HEENT: Recent changes in vision, vertigo, epistaxis, dysphagia and hoarseness CV: Chest pain, palpitations, HTN, edema and varicosities RESP: Cough, shortness of breath, wheezing, hemoptysis, snoring and reactive airway disease : Hematuria, dysuria, frequency, urgency, nocturia and stress urinary incontinence MS: Weakness, joint pain and arthritis ENDO: Diabetes, thyroid disease, polyuria, polydipsia, polyphagia, poor wound healing, heat intolerance, cold intolerance LYMPH/HEME: Anemia, bruising and history of blood transfusions NEURO: Dizziness, headache, fainting, seizures and stroke PSYCH: Anxiety and depression Physical Exam 
 
Visit Vitals /83 (BP 1 Location: Right arm, BP Patient Position: Sitting) Pulse 84 Temp 97.9 °F (36.6 °C) (Skin) Ht 5' 2\" (1.575 m) Wt 121.6 kg (268 lb) SpO2 96% BMI 49.02 kg/m² General: Clinically severely obese white female in no acute distress Head: Normocephalic, atraumatic Mouth: Clear, no overt lesions, oral mucosa pink and moist 
Neck: Supple, no masses, no adenopathy or carotid bruits, trachea midline Resp: Clear to auscultation bilaterally, now wheeze, rhonchi, or rales, excursions normal and symmetrical 
Cardio: Regular rate and rhythm, no murmurs, clicks, gallops, or rubs. No edema or varicosities. Abdomen: Obese, soft, nontender, nondistended, normoactive bowel sounds, no hernias, no hepatosplenomegaly, Extremities: Warm, well perfused, no tenderness or swelling, normal gait/station Neuro: Sensation and strength grossly intact and symmetrical 
Psych: Alert and oriented to person, place, and time. August 13, 2020 CBC, CMP, cholesterol panel, TSH, urinalysis, vitamin B1, B12, folate, iron, vitamin D, H. pylori serology: Cholesterol 229, glucose 105, folate 17.7 with otherwise normal laboratory parameters June 17, 2020 Pap smear: No evidence of malignancy August 13, 2020 chest x-ray: No active cardiopulmonary disease October 13, 2020 echocardiogram: Ejection fraction 55 to 60%normal 
October 16, 2020 cardiology/Faith: Concurring with proceed with surgery Moon 10, 2020 psychology/Raj: Concurring with proceed with surgery August 20, 2020 bariatric nutrition/Pamela: Concurrent with proceed with surgery August 17, 2020 EKG: T wave abnormality, consider anterior ischemia Impression: 
 
Angie Alarcon is a 31 y.o. white female with a body mass index of 49 and obesity related comorbidities consisting of hypercholesterolemia, clinical obstructive sleep apnea, bilateral carpal tunnel syndrome, polycystic ovarian syndrome, weight related arthropathyback, knees who would benefit from bariatric surgery. We've discussed the restrictive and malabsorptive nature of the gastric bypass and compared and contrasted with the sleeve gastrectomy. The patient understands the likelihood of losing approximately 80% of their excess weight in 12 to 18 months. She also understands the risks including but not limited to bleeding, infection, need for reoperation, anastomotic ulcers, leaks and strictures, bowel obstruction secondary to adhesions and internal hernias, DVT, PE, heart attack, stroke, and death. Patient also understands risks of inadequate weight loss, excess weight loss, vitamin insufficiency, protein malnutrition, excess skin, and loss of hair.   We have reviewed the components of a successful postoperative course including requirement for a high protein, low carbohydrate diet, 60 oz a day of zero calorie liquids, daily vitamin supplementation, daily exercise, regular follow-up, and participation in support groups. We have reviewed the preoperative liver shrinking clear liquid diet, as well as reviewed any medication changes required while on the clear liquid diet. In addition, the patient understands that all medications to be taken during the first 8 weeks postoperatively can be taken whole as long as the medication is approximately the size of a Ricardo 325 mg aspirin tablet in size. The patient further understands that it is his/her responsibility to review these and verify with their primary care doctor and pharmacist that all medications are of the appropriate size. We will schedule the patient for laparoscopic gastric bypassin the near future.

## 2020-10-29 NOTE — PROGRESS NOTES
Miek Booker is a 31 y.o. female (: 1990) presenting to address:    Chief Complaint   Patient presents with    Pre-op Exam     LGBP pre op       Medication list and allergies have been reviewed with Mike Booker and updated as of today's date. I have gone over all Medical, Surgical and Social History with Mike Booker and updated/added the information accordingly. 1. Have you been to the ER, Urgent Care or Hospitalized since your last visit? NO      2. Have you followed up with your PCP or any other Physicians since your procedure/ last office visit?    NO

## 2020-10-29 NOTE — PROGRESS NOTES
Preop History and Physical Exam:    Chelly Busby is a 31 y.o. white female initially evaluated in this office on 2020 for discussion of the surgical options available for the definitive management of a clinically severe obesity. The patient at that time weighed 261 pounds with a body mass index of 48 with obesity comorbidities of hypercholesterolemia, clinical obstructive sleep apnea bilateral carpal tunnel syndrome, polycystic ovarian syndrome and weight related arthropathyback, knees. The patient after discussing surgical options available for definitive weight loss is likely pursue laparoscopic Danielle-en-Y gastric bypass to achieve definitive durable weight loss on the personal level with expected resolution of obesity related comorbidities patient presents today in follow-up for review of the diagnostic evaluation and surgical scheduling noting no new medical or surgical history. The patient currently weighs 268 pounds with a body mass index of 49. Ideal body weight 125 pounds, excess body weight 138 pounds, estimated postsurgical weight loss 110 pounds, postsurgical goal weight-based 90% loss of excess body weight 153 pounds. Past Medical History:   Diagnosis Date    Carpal tunnel syndrome     HLD (hyperlipidemia)     Obesity     PCOS (polycystic ovarian syndrome)        Past Surgical History:   Procedure Laterality Date    HX  SECTION      HX  SECTION  2016    HX TONSIL AND ADENOIDECTOMY      HX TONSILLECTOMY         Current Outpatient Medications   Medication Sig Dispense Refill    escitalopram oxalate (Lexapro) 10 mg tablet Take 10 mg by mouth daily.  metFORMIN (GLUCOPHAGE) 500 mg tablet Take  by mouth two (2) times daily (with meals).          No Known Allergies    Social History     Tobacco Use    Smoking status: Never Smoker    Smokeless tobacco: Never Used   Substance Use Topics    Alcohol use: Not Currently     Comment: socially    Drug use: Never Family History   Problem Relation Age of Onset    Diabetes Mother     Cancer Father        Review of Systems:  Positive in BOLD    CONST: Fever, weight loss, fatigue or chills  GI: Nausea, vomiting, abdominal pain, change in bowel habits, hematochezia, melena, and GERD   INTEG: Dermatitis, abnormal moles  HEENT: Recent changes in vision, vertigo, epistaxis, dysphagia and hoarseness  CV: Chest pain, palpitations, HTN, edema and varicosities  RESP: Cough, shortness of breath, wheezing, hemoptysis, snoring and reactive airway disease  : Hematuria, dysuria, frequency, urgency, nocturia and stress urinary incontinence   MS: Weakness, joint pain and arthritis  ENDO: Diabetes, thyroid disease, polyuria, polydipsia, polyphagia, poor wound healing, heat intolerance, cold intolerance  LYMPH/HEME: Anemia, bruising and history of blood transfusions  NEURO: Dizziness, headache, fainting, seizures and stroke  PSYCH: Anxiety and depression    Physical Exam    Visit Vitals  /83 (BP 1 Location: Right arm, BP Patient Position: Sitting)   Pulse 84   Temp 97.9 °F (36.6 °C) (Skin)   Ht 5' 2\" (1.575 m)   Wt 121.6 kg (268 lb)   SpO2 96%   BMI 49.02 kg/m²         General: Clinically severely obese white female in no acute distress  Head: Normocephalic, atraumatic  Mouth: Clear, no overt lesions, oral mucosa pink and moist  Neck: Supple, no masses, no adenopathy or carotid bruits, trachea midline  Resp: Clear to auscultation bilaterally, now wheeze, rhonchi, or rales, excursions normal and symmetrical  Cardio: Regular rate and rhythm, no murmurs, clicks, gallops, or rubs. No edema or varicosities. Abdomen: Obese, soft, nontender, nondistended, normoactive bowel sounds, no hernias, no hepatosplenomegaly,  Extremities: Warm, well perfused, no tenderness or swelling, normal gait/station  Neuro: Sensation and strength grossly intact and symmetrical  Psych: Alert and oriented to person, place, and time.     August 13, 2020 CBC, CMP, cholesterol panel, TSH, urinalysis, vitamin B1, B12, folate, iron, vitamin D, H. pylori serology: Cholesterol 229, glucose 105, folate 17.7 with otherwise normal laboratory parameters  June 17, 2020 Pap smear: No evidence of malignancy  August 13, 2020 chest x-ray: No active cardiopulmonary disease  October 13, 2020 echocardiogram: Ejection fraction 55 to 60%normal  October 16, 2020 cardiology/Marcell: Concurring with proceed with surgery  Moon 10, 2020 psychology/Raj: Concurring with proceed with surgery  August 20, 2020 bariatric nutrition/Pamela: Concurrent with proceed with surgery  August 17, 2020 EKG: T wave abnormality, consider anterior ischemia    Impression:    Ishan Ayala is a 31 y.o. white female with a body mass index of 49 and obesity related comorbidities consisting of hypercholesterolemia, clinical obstructive sleep apnea, bilateral carpal tunnel syndrome, polycystic ovarian syndrome, weight related arthropathyback, knees who would benefit from bariatric surgery. We've discussed the restrictive and malabsorptive nature of the gastric bypass and compared and contrasted with the sleeve gastrectomy. The patient understands the likelihood of losing approximately 80% of their excess weight in 12 to 18 months. She also understands the risks including but not limited to bleeding, infection, need for reoperation, anastomotic ulcers, leaks and strictures, bowel obstruction secondary to adhesions and internal hernias, DVT, PE, heart attack, stroke, and death. Patient also understands risks of inadequate weight loss, excess weight loss, vitamin insufficiency, protein malnutrition, excess skin, and loss of hair. We have reviewed the components of a successful postoperative course including requirement for a high protein, low carbohydrate diet, 60 oz a day of zero calorie liquids, daily vitamin supplementation, daily exercise, regular follow-up, and participation in support groups.   We have reviewed the preoperative liver shrinking clear liquid diet, as well as reviewed any medication changes required while on the clear liquid diet. In addition, the patient understands that all medications to be taken during the first 8 weeks postoperatively can be taken whole as long as the medication is approximately the size of a Rciardo 325 mg aspirin tablet in size. The patient further understands that it is his/her responsibility to review these and verify with their primary care doctor and pharmacist that all medications are of the appropriate size. We will schedule the patient for laparoscopic gastric bypassin the near future.

## 2020-11-05 ENCOUNTER — TELEPHONE (OUTPATIENT)
Dept: MEDSURG UNIT | Age: 30
End: 2020-11-05

## 2020-11-05 RX ORDER — ENOXAPARIN SODIUM 100 MG/ML
40 INJECTION SUBCUTANEOUS DAILY
Qty: 7 SYRINGE | Refills: 0 | Status: SHIPPED | OUTPATIENT
Start: 2020-11-05 | End: 2020-11-12

## 2020-11-10 ENCOUNTER — HOSPITAL ENCOUNTER (OUTPATIENT)
Dept: BARIATRICS/WEIGHT MGMT | Age: 30
Discharge: HOME | End: 2020-11-10

## 2020-11-10 ENCOUNTER — DOCUMENTATION ONLY (OUTPATIENT)
Dept: BARIATRICS/WEIGHT MGMT | Age: 30
End: 2020-11-10

## 2020-11-10 NOTE — PROGRESS NOTES
CLINICAL NUTRITION PRE-OPERATIVE EDUCATION    Patient's Name: Angie Alarcon   Age: 31 y.o. YOB: 1990   Sex: female    Education & Materials Provided:   - Soft Protein Diet Shopping List  -  Supplemental Resource Guide: MVI, B12, Calcium Citrate, Vitamin D, Vitamin B1,   and iron recommendations  - Protein Supplement Information  - Fluid Requirements/ No Straws  - No Caffeine or Carbonation   - No alcohol              - No Snacks or No Concentrated Sweets     - Exercising   - [facility]. Support System after surgery includes: x     - Key Diet Principles            - Addressed Current Habits/Changes to Make   - Patient has been educated on the liquid diet to begin 1 week prior to surgery. Patient understands the transition of the diet. Attendance of support group: Yes    Summary:  Patient has completed 6 visits with the Registered Dietitian. During these nutrition visits, we focused on dietary changes, behavior changes, and the importance of establishing an exercise routine. The diet protocol that patient was prescribed emphasized on low carbohydrates (less than 100 grams per day prior to surgery and 60-80 grams of protein per day). At today's session, patient was educated on the post-op diet protocol. Patient understands the importance of keeping total fat and sugar less than 3 grams per serving. Patient is aware of the transition of the diet stages and is aware that they will be on clear liquids for 7days, followed by soft protein for 5 weeks. Patient understands the body needs ~ 60-70 grams of protein per day. During the liquid phase, patient will need 60 grams of protein from shakes. Once eating soft protein, patient will only need 1 shake per day. Patient is aware of the importance of the vitamins and protein drinks. We spent a lot of time talking about the vitamins.   Patient understands the importance of being compliant with the diet protocol and the complications and risks that can occur if they are non-compliant with the nutritional protocol and non-compliant with the vitamins. Patient has also been educated on the pre-op liquid diet for 1 week. Patient understands that failure to comply in pre-op liquid diet could result in surgery being canceled. Patient's 6 week post-op nutrition appointment has been scheduled. At this 6 week visit, RD will assess how patient is tolerating soft protein and advance to vegetables, if tolerating soft protein without difficulty. Patient will also see RD again at 9 months post-op. This visit will assess patient's compliance with current protocol, including diet, vitamins, protein shakes, and exercise. Post-op diet guidelines will be reinforced. RD is available for questions and to meet with patient outside of the 6 week and 9 month post-op visit. Ok to proceed.      Candidate for surgery: Yes  Re-evaluation Date:     Yessica Carmichael 87 RD  11/10/2020

## 2020-11-18 ENCOUNTER — HOSPITAL ENCOUNTER (OUTPATIENT)
Dept: PREADMISSION TESTING | Age: 30
Discharge: HOME | End: 2020-11-18
Payer: COMMERCIAL

## 2020-11-18 DIAGNOSIS — Z01.818 PREOP TESTING: ICD-10-CM

## 2020-11-18 PROCEDURE — 87635 SARS-COV-2 COVID-19 AMP PRB: CPT

## 2020-11-18 PROCEDURE — 36415 COLL VENOUS BLD VENIPUNCTURE: CPT

## 2020-11-19 LAB — SARS-COV-2, COV2NT: NOT DETECTED

## 2020-11-20 ENCOUNTER — ANESTHESIA EVENT (OUTPATIENT)
Dept: SURGERY | Age: 30
End: 2020-11-20
Payer: COMMERCIAL

## 2020-11-23 ENCOUNTER — HOSPITAL ENCOUNTER (OUTPATIENT)
Age: 30
Setting detail: OBSERVATION
Discharge: HOME | End: 2020-11-24
Attending: SURGERY | Admitting: SURGERY
Payer: COMMERCIAL

## 2020-11-23 ENCOUNTER — ANESTHESIA (OUTPATIENT)
Dept: SURGERY | Age: 30
End: 2020-11-23
Payer: COMMERCIAL

## 2020-11-23 DIAGNOSIS — G89.18 POST-OP PAIN: Primary | ICD-10-CM

## 2020-11-23 DIAGNOSIS — L76.82 INCISIONAL PAIN: ICD-10-CM

## 2020-11-23 PROBLEM — E66.01 MORBID OBESITY WITH BMI OF 45.0-49.9, ADULT (HCC): Status: ACTIVE | Noted: 2020-11-23

## 2020-11-23 LAB — HCG UR QL: NEGATIVE

## 2020-11-23 PROCEDURE — 77030027876 HC STPLR ENDOSC FLX PWR J&J -G1: Performed by: SURGERY

## 2020-11-23 PROCEDURE — 77030010031 HC SCIS ENDOSC MPLR J&J -C: Performed by: SURGERY

## 2020-11-23 PROCEDURE — 00797 ANES IPER UPR ABD GSTR PX MO: CPT | Performed by: ANESTHESIOLOGY

## 2020-11-23 PROCEDURE — 74011250636 HC RX REV CODE- 250/636: Performed by: SURGERY

## 2020-11-23 PROCEDURE — 2709999900 HC NON-CHARGEABLE SUPPLY: Performed by: SURGERY

## 2020-11-23 PROCEDURE — 77030009968 HC RELD STPLR ENDOSC J&J -D: Performed by: SURGERY

## 2020-11-23 PROCEDURE — 77030040922 HC BLNKT HYPOTHRM STRY -A: Performed by: SURGERY

## 2020-11-23 PROCEDURE — 77030008437 HC REINF STRP REINF SEMGD WLGO -C: Performed by: SURGERY

## 2020-11-23 PROCEDURE — C9290 INJ, BUPIVACAINE LIPOSOME: HCPCS | Performed by: SURGERY

## 2020-11-23 PROCEDURE — 74011000250 HC RX REV CODE- 250: Performed by: SURGERY

## 2020-11-23 PROCEDURE — 74011250636 HC RX REV CODE- 250/636: Performed by: NURSE ANESTHETIST, CERTIFIED REGISTERED

## 2020-11-23 PROCEDURE — 81025 URINE PREGNANCY TEST: CPT

## 2020-11-23 PROCEDURE — 77030009851 HC PCH RTVR ENDOSC AMR -B: Performed by: SURGERY

## 2020-11-23 PROCEDURE — 00797 ANES IPER UPR ABD GSTR PX MO: CPT | Performed by: NURSE ANESTHETIST, CERTIFIED REGISTERED

## 2020-11-23 PROCEDURE — 2709999900 HC NON-CHARGEABLE SUPPLY

## 2020-11-23 PROCEDURE — 76060000035 HC ANESTHESIA 2 TO 2.5 HR: Performed by: SURGERY

## 2020-11-23 PROCEDURE — 77030040830 HC CATH URETH FOL MDII -A: Performed by: SURGERY

## 2020-11-23 PROCEDURE — 77030036732 HC RELD STPLR VASC J&J -F: Performed by: SURGERY

## 2020-11-23 PROCEDURE — 77030002933 HC SUT MCRYL J&J -A: Performed by: SURGERY

## 2020-11-23 PROCEDURE — 77030009932 HC PRB FT CTRL J&J -B: Performed by: SURGERY

## 2020-11-23 PROCEDURE — 77030026438 HC STYL ET INTUB CARD -A: Performed by: ANESTHESIOLOGY

## 2020-11-23 PROCEDURE — 77030033639 HC SHR ENDO COAG HARM 36 J&J -E: Performed by: SURGERY

## 2020-11-23 PROCEDURE — 65270000029 HC RM PRIVATE

## 2020-11-23 PROCEDURE — 74011250637 HC RX REV CODE- 250/637: Performed by: SURGERY

## 2020-11-23 PROCEDURE — 77030008683 HC TU ET CUF COVD -A: Performed by: ANESTHESIOLOGY

## 2020-11-23 PROCEDURE — 77030031139 HC SUT VCRL2 J&J -A: Performed by: SURGERY

## 2020-11-23 PROCEDURE — 77030008603 HC TRCR ENDOSC EPATH J&J -C: Performed by: SURGERY

## 2020-11-23 PROCEDURE — 77030013079 HC BLNKT BAIR HGGR 3M -A: Performed by: ANESTHESIOLOGY

## 2020-11-23 PROCEDURE — 76210000016 HC OR PH I REC 1 TO 1.5 HR: Performed by: SURGERY

## 2020-11-23 PROCEDURE — 76010000131 HC OR TIME 2 TO 2.5 HR: Performed by: SURGERY

## 2020-11-23 PROCEDURE — 77030002996 HC SUT SLK J&J -A: Performed by: SURGERY

## 2020-11-23 PROCEDURE — 74011250636 HC RX REV CODE- 250/636

## 2020-11-23 PROCEDURE — 74011000250 HC RX REV CODE- 250: Performed by: NURSE ANESTHETIST, CERTIFIED REGISTERED

## 2020-11-23 PROCEDURE — 77030008598 HC TRCR ENDOSC BLDLS J&J -B: Performed by: SURGERY

## 2020-11-23 RX ORDER — CHLORHEXIDINE GLUCONATE 4 G/100ML
SOLUTION TOPICAL AS NEEDED
Status: DISCONTINUED | OUTPATIENT
Start: 2020-11-23 | End: 2020-11-23 | Stop reason: HOSPADM

## 2020-11-23 RX ORDER — PROPOFOL 10 MG/ML
INJECTION, EMULSION INTRAVENOUS AS NEEDED
Status: DISCONTINUED | OUTPATIENT
Start: 2020-11-23 | End: 2020-11-23 | Stop reason: HOSPADM

## 2020-11-23 RX ORDER — NEOSTIGMINE METHYLSULFATE 1 MG/ML
INJECTION INTRAVENOUS AS NEEDED
Status: DISCONTINUED | OUTPATIENT
Start: 2020-11-23 | End: 2020-11-23 | Stop reason: HOSPADM

## 2020-11-23 RX ORDER — ONDANSETRON 2 MG/ML
4 INJECTION INTRAMUSCULAR; INTRAVENOUS ONCE
Status: COMPLETED | OUTPATIENT
Start: 2020-11-23 | End: 2020-11-23

## 2020-11-23 RX ORDER — ONDANSETRON 2 MG/ML
4 INJECTION INTRAMUSCULAR; INTRAVENOUS
Status: DISCONTINUED | OUTPATIENT
Start: 2020-11-23 | End: 2020-11-24 | Stop reason: HOSPADM

## 2020-11-23 RX ORDER — SODIUM CHLORIDE, SODIUM LACTATE, POTASSIUM CHLORIDE, CALCIUM CHLORIDE 600; 310; 30; 20 MG/100ML; MG/100ML; MG/100ML; MG/100ML
50 INJECTION, SOLUTION INTRAVENOUS CONTINUOUS
Status: DISCONTINUED | OUTPATIENT
Start: 2020-11-23 | End: 2020-11-23 | Stop reason: HOSPADM

## 2020-11-23 RX ORDER — ACETAMINOPHEN 325 MG/1
650 TABLET ORAL EVERY 6 HOURS
Status: DISCONTINUED | OUTPATIENT
Start: 2020-11-23 | End: 2020-11-24 | Stop reason: HOSPADM

## 2020-11-23 RX ORDER — HYDROMORPHONE HYDROCHLORIDE 2 MG/ML
INJECTION, SOLUTION INTRAMUSCULAR; INTRAVENOUS; SUBCUTANEOUS AS NEEDED
Status: DISCONTINUED | OUTPATIENT
Start: 2020-11-23 | End: 2020-11-23 | Stop reason: HOSPADM

## 2020-11-23 RX ORDER — GLYCOPYRROLATE 0.2 MG/ML
INJECTION INTRAMUSCULAR; INTRAVENOUS AS NEEDED
Status: DISCONTINUED | OUTPATIENT
Start: 2020-11-23 | End: 2020-11-23 | Stop reason: HOSPADM

## 2020-11-23 RX ORDER — SODIUM CHLORIDE, SODIUM LACTATE, POTASSIUM CHLORIDE, CALCIUM CHLORIDE 600; 310; 30; 20 MG/100ML; MG/100ML; MG/100ML; MG/100ML
25 INJECTION, SOLUTION INTRAVENOUS CONTINUOUS
Status: DISCONTINUED | OUTPATIENT
Start: 2020-11-23 | End: 2020-11-23 | Stop reason: HOSPADM

## 2020-11-23 RX ORDER — ALBUTEROL SULFATE 0.83 MG/ML
2.5 SOLUTION RESPIRATORY (INHALATION) AS NEEDED
Status: DISCONTINUED | OUTPATIENT
Start: 2020-11-23 | End: 2020-11-23 | Stop reason: HOSPADM

## 2020-11-23 RX ORDER — PROMETHAZINE HYDROCHLORIDE 25 MG/ML
12.5 INJECTION, SOLUTION INTRAMUSCULAR; INTRAVENOUS ONCE
Status: COMPLETED | OUTPATIENT
Start: 2020-11-23 | End: 2020-11-23

## 2020-11-23 RX ORDER — FAMOTIDINE 20 MG/1
20 TABLET, FILM COATED ORAL ONCE
Status: DISCONTINUED | OUTPATIENT
Start: 2020-11-23 | End: 2020-11-23

## 2020-11-23 RX ORDER — FENTANYL CITRATE 50 UG/ML
INJECTION, SOLUTION INTRAMUSCULAR; INTRAVENOUS AS NEEDED
Status: DISCONTINUED | OUTPATIENT
Start: 2020-11-23 | End: 2020-11-23 | Stop reason: HOSPADM

## 2020-11-23 RX ORDER — HYDROMORPHONE HYDROCHLORIDE 2 MG/ML
0.5 INJECTION, SOLUTION INTRAMUSCULAR; INTRAVENOUS; SUBCUTANEOUS AS NEEDED
Status: DISCONTINUED | OUTPATIENT
Start: 2020-11-23 | End: 2020-11-23 | Stop reason: HOSPADM

## 2020-11-23 RX ORDER — DEXTROSE 50 % IN WATER (D50W) INTRAVENOUS SYRINGE
25-50 AS NEEDED
Status: DISCONTINUED | OUTPATIENT
Start: 2020-11-23 | End: 2020-11-23 | Stop reason: HOSPADM

## 2020-11-23 RX ORDER — HYDROMORPHONE HYDROCHLORIDE 1 MG/ML
1 INJECTION, SOLUTION INTRAMUSCULAR; INTRAVENOUS; SUBCUTANEOUS
Status: DISCONTINUED | OUTPATIENT
Start: 2020-11-23 | End: 2020-11-24 | Stop reason: HOSPADM

## 2020-11-23 RX ORDER — LABETALOL HYDROCHLORIDE 5 MG/ML
INJECTION, SOLUTION INTRAVENOUS AS NEEDED
Status: DISCONTINUED | OUTPATIENT
Start: 2020-11-23 | End: 2020-11-23 | Stop reason: HOSPADM

## 2020-11-23 RX ORDER — MIDAZOLAM HYDROCHLORIDE 1 MG/ML
INJECTION, SOLUTION INTRAMUSCULAR; INTRAVENOUS AS NEEDED
Status: DISCONTINUED | OUTPATIENT
Start: 2020-11-23 | End: 2020-11-23 | Stop reason: HOSPADM

## 2020-11-23 RX ORDER — SODIUM CHLORIDE, SODIUM LACTATE, POTASSIUM CHLORIDE, CALCIUM CHLORIDE 600; 310; 30; 20 MG/100ML; MG/100ML; MG/100ML; MG/100ML
150 INJECTION, SOLUTION INTRAVENOUS CONTINUOUS
Status: DISCONTINUED | OUTPATIENT
Start: 2020-11-23 | End: 2020-11-24 | Stop reason: HOSPADM

## 2020-11-23 RX ORDER — DIPHENHYDRAMINE HYDROCHLORIDE 50 MG/ML
12.5 INJECTION, SOLUTION INTRAMUSCULAR; INTRAVENOUS
Status: DISCONTINUED | OUTPATIENT
Start: 2020-11-23 | End: 2020-11-23 | Stop reason: HOSPADM

## 2020-11-23 RX ORDER — MAGNESIUM SULFATE 100 %
4 CRYSTALS MISCELLANEOUS AS NEEDED
Status: DISCONTINUED | OUTPATIENT
Start: 2020-11-23 | End: 2020-11-23 | Stop reason: HOSPADM

## 2020-11-23 RX ORDER — NALOXONE HYDROCHLORIDE 0.4 MG/ML
0.04 INJECTION, SOLUTION INTRAMUSCULAR; INTRAVENOUS; SUBCUTANEOUS AS NEEDED
Status: DISCONTINUED | OUTPATIENT
Start: 2020-11-23 | End: 2020-11-23 | Stop reason: HOSPADM

## 2020-11-23 RX ORDER — DIPHENHYDRAMINE HYDROCHLORIDE 50 MG/ML
25 INJECTION, SOLUTION INTRAMUSCULAR; INTRAVENOUS
Status: DISCONTINUED | OUTPATIENT
Start: 2020-11-23 | End: 2020-11-24 | Stop reason: HOSPADM

## 2020-11-23 RX ORDER — FENTANYL CITRATE 50 UG/ML
50 INJECTION, SOLUTION INTRAMUSCULAR; INTRAVENOUS
Status: DISCONTINUED | OUTPATIENT
Start: 2020-11-23 | End: 2020-11-23 | Stop reason: HOSPADM

## 2020-11-23 RX ORDER — ROCURONIUM BROMIDE 10 MG/ML
INJECTION, SOLUTION INTRAVENOUS AS NEEDED
Status: DISCONTINUED | OUTPATIENT
Start: 2020-11-23 | End: 2020-11-23 | Stop reason: HOSPADM

## 2020-11-23 RX ORDER — PROMETHAZINE HYDROCHLORIDE 25 MG/ML
INJECTION, SOLUTION INTRAMUSCULAR; INTRAVENOUS
Status: COMPLETED
Start: 2020-11-23 | End: 2020-11-23

## 2020-11-23 RX ORDER — OXYCODONE HYDROCHLORIDE 5 MG/1
5 TABLET ORAL
Status: DISCONTINUED | OUTPATIENT
Start: 2020-11-23 | End: 2020-11-24 | Stop reason: HOSPADM

## 2020-11-23 RX ORDER — LIDOCAINE HYDROCHLORIDE 20 MG/ML
INJECTION, SOLUTION EPIDURAL; INFILTRATION; INTRACAUDAL; PERINEURAL AS NEEDED
Status: DISCONTINUED | OUTPATIENT
Start: 2020-11-23 | End: 2020-11-23 | Stop reason: HOSPADM

## 2020-11-23 RX ORDER — INSULIN LISPRO 100 [IU]/ML
INJECTION, SOLUTION INTRAVENOUS; SUBCUTANEOUS ONCE
Status: DISCONTINUED | OUTPATIENT
Start: 2020-11-23 | End: 2020-11-23 | Stop reason: HOSPADM

## 2020-11-23 RX ORDER — ENOXAPARIN SODIUM 100 MG/ML
40 INJECTION SUBCUTANEOUS DAILY
Status: DISCONTINUED | OUTPATIENT
Start: 2020-11-24 | End: 2020-11-24 | Stop reason: HOSPADM

## 2020-11-23 RX ORDER — NALOXONE HYDROCHLORIDE 0.4 MG/ML
0.4 INJECTION, SOLUTION INTRAMUSCULAR; INTRAVENOUS; SUBCUTANEOUS AS NEEDED
Status: DISCONTINUED | OUTPATIENT
Start: 2020-11-23 | End: 2020-11-24 | Stop reason: HOSPADM

## 2020-11-23 RX ORDER — SODIUM CHLORIDE, SODIUM LACTATE, POTASSIUM CHLORIDE, CALCIUM CHLORIDE 600; 310; 30; 20 MG/100ML; MG/100ML; MG/100ML; MG/100ML
150 INJECTION, SOLUTION INTRAVENOUS CONTINUOUS
Status: DISCONTINUED | OUTPATIENT
Start: 2020-11-23 | End: 2020-11-23 | Stop reason: HOSPADM

## 2020-11-23 RX ORDER — ENOXAPARIN SODIUM 100 MG/ML
40 INJECTION SUBCUTANEOUS ONCE
Status: COMPLETED | OUTPATIENT
Start: 2020-11-23 | End: 2020-11-23

## 2020-11-23 RX ORDER — KETOROLAC TROMETHAMINE 15 MG/ML
15 INJECTION, SOLUTION INTRAMUSCULAR; INTRAVENOUS
Status: DISCONTINUED | OUTPATIENT
Start: 2020-11-23 | End: 2020-11-24 | Stop reason: HOSPADM

## 2020-11-23 RX ORDER — SUCCINYLCHOLINE CHLORIDE 20 MG/ML
INJECTION INTRAMUSCULAR; INTRAVENOUS AS NEEDED
Status: DISCONTINUED | OUTPATIENT
Start: 2020-11-23 | End: 2020-11-23 | Stop reason: HOSPADM

## 2020-11-23 RX ORDER — HYOSCYAMINE SULFATE 0.12 MG/1
0.12 TABLET SUBLINGUAL
Status: DISCONTINUED | OUTPATIENT
Start: 2020-11-23 | End: 2020-11-24 | Stop reason: HOSPADM

## 2020-11-23 RX ADMIN — ONDANSETRON 4 MG: 2 INJECTION INTRAMUSCULAR; INTRAVENOUS at 13:09

## 2020-11-23 RX ADMIN — ACETAMINOPHEN 650 MG: 325 TABLET ORAL at 21:18

## 2020-11-23 RX ADMIN — ROCURONIUM BROMIDE 10 MG: 50 INJECTION INTRAVENOUS at 10:35

## 2020-11-23 RX ADMIN — FENTANYL CITRATE 100 MCG: 50 INJECTION, SOLUTION INTRAMUSCULAR; INTRAVENOUS at 10:35

## 2020-11-23 RX ADMIN — LABETALOL HYDROCHLORIDE 15 MG: 5 INJECTION, SOLUTION INTRAVENOUS at 11:46

## 2020-11-23 RX ADMIN — KETOROLAC TROMETHAMINE 15 MG: 15 INJECTION, SOLUTION INTRAMUSCULAR; INTRAVENOUS at 21:19

## 2020-11-23 RX ADMIN — FENTANYL CITRATE 50 MCG: 50 INJECTION, SOLUTION INTRAMUSCULAR; INTRAVENOUS at 10:54

## 2020-11-23 RX ADMIN — SODIUM CHLORIDE, SODIUM LACTATE, POTASSIUM CHLORIDE, AND CALCIUM CHLORIDE: 600; 310; 30; 20 INJECTION, SOLUTION INTRAVENOUS at 12:24

## 2020-11-23 RX ADMIN — MIDAZOLAM HYDROCHLORIDE 2 MG: 2 INJECTION, SOLUTION INTRAMUSCULAR; INTRAVENOUS at 10:49

## 2020-11-23 RX ADMIN — NEOSTIGMINE METHYLSULFATE 5 MG: 1 INJECTION, SOLUTION INTRAVENOUS at 12:31

## 2020-11-23 RX ADMIN — PROPOFOL 200 MG: 10 INJECTION, EMULSION INTRAVENOUS at 10:35

## 2020-11-23 RX ADMIN — GLYCOPYRROLATE 0.6 MG: 0.2 INJECTION INTRAMUSCULAR; INTRAVENOUS at 12:31

## 2020-11-23 RX ADMIN — KETOROLAC TROMETHAMINE 15 MG: 15 INJECTION, SOLUTION INTRAMUSCULAR; INTRAVENOUS at 14:46

## 2020-11-23 RX ADMIN — GLYCOPYRROLATE 0.2 MG: 0.2 INJECTION INTRAMUSCULAR; INTRAVENOUS at 10:49

## 2020-11-23 RX ADMIN — HYDROMORPHONE HYDROCHLORIDE 0.5 MG: 2 INJECTION, SOLUTION INTRAMUSCULAR; INTRAVENOUS; SUBCUTANEOUS at 12:43

## 2020-11-23 RX ADMIN — SODIUM CHLORIDE, SODIUM LACTATE, POTASSIUM CHLORIDE, AND CALCIUM CHLORIDE: 600; 310; 30; 20 INJECTION, SOLUTION INTRAVENOUS at 10:49

## 2020-11-23 RX ADMIN — FENTANYL CITRATE 50 MCG: 50 INJECTION, SOLUTION INTRAMUSCULAR; INTRAVENOUS at 12:16

## 2020-11-23 RX ADMIN — PROMETHAZINE HYDROCHLORIDE 12.5 MG: 25 INJECTION, SOLUTION INTRAMUSCULAR; INTRAVENOUS at 14:00

## 2020-11-23 RX ADMIN — HYDROMORPHONE HYDROCHLORIDE 0.5 MG: 2 INJECTION, SOLUTION INTRAMUSCULAR; INTRAVENOUS; SUBCUTANEOUS at 12:49

## 2020-11-23 RX ADMIN — LABETALOL HYDROCHLORIDE 5 MG: 5 INJECTION, SOLUTION INTRAVENOUS at 11:17

## 2020-11-23 RX ADMIN — SODIUM CHLORIDE, SODIUM LACTATE, POTASSIUM CHLORIDE, AND CALCIUM CHLORIDE 25 ML/HR: 600; 310; 30; 20 INJECTION, SOLUTION INTRAVENOUS at 10:17

## 2020-11-23 RX ADMIN — ROCURONIUM BROMIDE 40 MG: 50 INJECTION INTRAVENOUS at 10:43

## 2020-11-23 RX ADMIN — FENTANYL CITRATE 50 MCG: 50 INJECTION, SOLUTION INTRAMUSCULAR; INTRAVENOUS at 10:43

## 2020-11-23 RX ADMIN — SODIUM CHLORIDE, SODIUM LACTATE, POTASSIUM CHLORIDE, AND CALCIUM CHLORIDE 150 ML/HR: 600; 310; 30; 20 INJECTION, SOLUTION INTRAVENOUS at 14:48

## 2020-11-23 RX ADMIN — ROCURONIUM BROMIDE 10 MG: 50 INJECTION INTRAVENOUS at 11:30

## 2020-11-23 RX ADMIN — FENTANYL CITRATE 50 MCG: 50 INJECTION, SOLUTION INTRAMUSCULAR; INTRAVENOUS at 12:27

## 2020-11-23 RX ADMIN — LIDOCAINE HYDROCHLORIDE 100 MG: 20 INJECTION, SOLUTION EPIDURAL; INFILTRATION; INTRACAUDAL; PERINEURAL at 10:35

## 2020-11-23 RX ADMIN — FAMOTIDINE 20 MG: 10 INJECTION INTRAVENOUS at 10:20

## 2020-11-23 RX ADMIN — SUCCINYLCHOLINE CHLORIDE 140 MG: 20 INJECTION, SOLUTION INTRAMUSCULAR; INTRAVENOUS at 10:35

## 2020-11-23 RX ADMIN — ENOXAPARIN SODIUM 40 MG: 40 INJECTION SUBCUTANEOUS at 10:18

## 2020-11-23 RX ADMIN — HYDROMORPHONE HYDROCHLORIDE 0.5 MG: 2 INJECTION, SOLUTION INTRAMUSCULAR; INTRAVENOUS; SUBCUTANEOUS at 13:19

## 2020-11-23 RX ADMIN — HYDROMORPHONE HYDROCHLORIDE 0.5 MG: 2 INJECTION, SOLUTION INTRAMUSCULAR; INTRAVENOUS; SUBCUTANEOUS at 13:29

## 2020-11-23 RX ADMIN — CEFAZOLIN 3 G: 1 INJECTION, POWDER, FOR SOLUTION INTRAMUSCULAR; INTRAVENOUS; PARENTERAL at 10:49

## 2020-11-23 RX ADMIN — ACETAMINOPHEN 650 MG: 325 TABLET ORAL at 14:46

## 2020-11-23 NOTE — BRIEF OP NOTE
Brief Postoperative Note    Patient: Usha Dey  YOB: 1990  MRN: [id number]    Date of Procedure: 2020     Pre-Op Diagnosis: Morbid obesity ([facility] [E66.01]  Obstructive sleep apnea [G47.33]  BMI 45.0-49.9, adult ([facility] 75.) [Z68.42]    Post-Op Diagnosis: 1. Intra-abdominal adhesive disease 2. OSIEL  Procedure(s):  1. Laparoscopic lysis of adhesions  2.   Laparoscopic Danielle-en-Y gastric bypass    Surgeon(s):  Jeanne Izquierdo DO    Surgical Assistant: Surg Asst-1: Laura Park    Anesthesia: General     Estimated Blood Loss (mL): Minimal    Complications: None    Specimens: * No specimens in log *     Implants: * No implants in log *    Drains:   [REMOVED] Orogastric Tube 20 (Removed)       Findings: Intra-abdominal adhesive disease consisting of omentum and uterine fundus adhesed to the anterior bowel wall in the region of the previous     Electronically Signed by Miguelangel Burnham DO on 2020 at 12:51 PM

## 2020-11-23 NOTE — INTERVAL H&P NOTE
Update History & Physical    The Patient's History and Physical dated October 29, 2020 was reviewed. There has been no change in the medical or surgical history  The proposed laparoscopic potentially open Danielle-en-Y gastric bypass was reviewed with the patient and I examined the patient. There has been no change in the planned laparoscopic potentially open Danielle-en-Y gastric bypass definitively treat her clinically severe obesity with expected risk of obesity related morbidities. The surgical site was confirmed by the patient and me. Plan:  The risk, benefits, expected outcome, and alternative to the recommended procedure have been discussed with the patient. Patient understands and wants to proceed with the procedure.     Electronically signed by Mary Vaca DO on 11/23/2020 at 9:55 AM

## 2020-11-23 NOTE — ROUTINE PROCESS
Pt ambulating in the hallway. Pt stable. 1930- Report given to Timothy using the Krdex, Sbar, and MAR.

## 2020-11-23 NOTE — ANESTHESIA PREPROCEDURE EVALUATION
Relevant Problems   No relevant active problems       Anesthetic History   No history of anesthetic complications            Review of Systems / Medical History  Patient summary reviewed and pertinent labs reviewed    Pulmonary  Within defined limits                 Neuro/Psych   Within defined limits           Cardiovascular  Within defined limits                     GI/Hepatic/Renal  Within defined limits              Endo/Other        Morbid obesity     Other Findings              Physical Exam    Airway  Mallampati: II  TM Distance: 4 - 6 cm  Neck ROM: normal range of motion   Mouth opening: Normal     Cardiovascular               Dental  No notable dental hx       Pulmonary                 Abdominal  GI exam deferred       Other Findings            Anesthetic Plan    ASA: 3  Anesthesia type: general          Induction: Intravenous  Anesthetic plan and risks discussed with: Patient

## 2020-11-23 NOTE — PROGRESS NOTES
Plan for d/c home tomorrow 11/24 with family support upon medical clearance. Case management available as needed for any d/c needs.      CECI Domingo  Case Management  [phone]

## 2020-11-23 NOTE — PERIOP NOTES
TRANSFER - OUT REPORT:    Verbal report given to [facility] PAMELA SMITH on [facility]  being transferred to [facility] for routine post - op       Report consisted of patients Situation, Background, Assessment and   Recommendations(SBAR). Information from the following report(s) OR Summary and MAR was reviewed with the receiving nurse. Lines:   Peripheral IV 11/23/20 Left Hand (Active)   Site Assessment Clean, dry, & intact 11/23/20 1248   Phlebitis Assessment 0 11/23/20 1248   Infiltration Assessment 0 11/23/20 1248   Dressing Status Clean, dry, & intact 11/23/20 1248   Dressing Type Transparent;Tape 11/23/20 1248   Hub Color/Line Status Pink 11/23/20 1248   Alcohol Cap Used No 11/23/20 1015        Opportunity for questions and clarification was provided.       Patient transported with:   [employer]

## 2020-11-23 NOTE — ANESTHESIA POSTPROCEDURE EVALUATION
Procedure(s):  LYSIS OF ADHESIONS/LAPAROSCOPIC DIONY-EN-Y GASTRIC BYPASS  WITH POSSIBLE LIVER WEDGE BIOPSY/POSSIBLE HIATAL HERNIA REPAIR. general    Anesthesia Post Evaluation      Multimodal analgesia: multimodal analgesia used between 6 hours prior to anesthesia start to PACU discharge  Patient location during evaluation: PACU  Patient participation: complete - patient participated  Level of consciousness: awake and alert  Pain management: adequate  Airway patency: patent  Anesthetic complications: no  Cardiovascular status: acceptable  Respiratory status: acceptable  Hydration status: acceptable  Post anesthesia nausea and vomiting:  controlled  Final Post Anesthesia Temperature Assessment:  Normothermia (36.0-37.5 degrees C)      INITIAL Post-op Vital signs:   Vitals Value Taken Time   /75 11/23/2020  1:43 PM   Temp 36.7 °C (98 °F) 11/23/2020 12:48 PM   Pulse 75 11/23/2020  1:51 PM   Resp 23 11/23/2020  1:51 PM   SpO2 96 % 11/23/2020  1:51 PM   Vitals shown include unvalidated device data.

## 2020-11-24 VITALS
HEART RATE: 84 BPM | DIASTOLIC BLOOD PRESSURE: 74 MMHG | OXYGEN SATURATION: 98 % | BODY MASS INDEX: 48.21 KG/M2 | SYSTOLIC BLOOD PRESSURE: 120 MMHG | WEIGHT: 262 LBS | HEIGHT: 62 IN | TEMPERATURE: 98.4 F | RESPIRATION RATE: 16 BRPM

## 2020-11-24 PROBLEM — Z98.84 GASTRIC BYPASS STATUS FOR OBESITY: Status: ACTIVE | Noted: 2020-11-24

## 2020-11-24 LAB
ANION GAP SERPL CALC-SCNC: 8 MMOL/L (ref 3–18)
BASOPHILS # BLD: 0 K/UL (ref 0–0.1)
BASOPHILS NFR BLD: 0 % (ref 0–2)
BUN SERPL-MCNC: 7 MG/DL (ref 7–18)
BUN/CREAT SERPL: 9 (ref 12–20)
CALCIUM SERPL-MCNC: 8.8 MG/DL (ref 8.5–10.1)
CHLORIDE SERPL-SCNC: 109 MMOL/L (ref 100–111)
CO2 SERPL-SCNC: 23 MMOL/L (ref 21–32)
CREAT SERPL-MCNC: 0.77 MG/DL (ref 0.6–1.3)
DIFFERENTIAL METHOD BLD: ABNORMAL
EOSINOPHIL # BLD: 0 K/UL (ref 0–0.4)
EOSINOPHIL NFR BLD: 0 % (ref 0–5)
ERYTHROCYTE [DISTWIDTH] IN BLOOD BY AUTOMATED COUNT: 12.5 % (ref 11.6–14.5)
GLUCOSE SERPL-MCNC: 97 MG/DL (ref 74–99)
HCT VFR BLD AUTO: 37.5 % (ref 35–45)
HGB BLD-MCNC: 13 G/DL (ref 12–16)
LYMPHOCYTES # BLD: 1.9 K/UL (ref 0.9–3.6)
LYMPHOCYTES NFR BLD: 17 % (ref 21–52)
MCH RBC QN AUTO: 29.9 PG (ref 24–34)
MCHC RBC AUTO-ENTMCNC: 34.7 G/DL (ref 31–37)
MCV RBC AUTO: 86.2 FL (ref 74–97)
MONOCYTES # BLD: 1.2 K/UL (ref 0.05–1.2)
MONOCYTES NFR BLD: 11 % (ref 3–10)
NEUTS SEG # BLD: 8 K/UL (ref 1.8–8)
NEUTS SEG NFR BLD: 72 % (ref 40–73)
PLATELET # BLD AUTO: 282 K/UL (ref 135–420)
PMV BLD AUTO: 10.7 FL (ref 9.2–11.8)
POTASSIUM SERPL-SCNC: 4.2 MMOL/L (ref 3.5–5.5)
RBC # BLD AUTO: 4.35 M/UL (ref 4.2–5.3)
SODIUM SERPL-SCNC: 140 MMOL/L (ref 136–145)
WBC # BLD AUTO: 11.1 K/UL (ref 4.6–13.2)

## 2020-11-24 PROCEDURE — 99218 HC RM OBSERVATION: CPT

## 2020-11-24 PROCEDURE — 2709999900 HC NON-CHARGEABLE SUPPLY

## 2020-11-24 PROCEDURE — 36415 COLL VENOUS BLD VENIPUNCTURE: CPT

## 2020-11-24 PROCEDURE — 74011250636 HC RX REV CODE- 250/636: Performed by: SURGERY

## 2020-11-24 PROCEDURE — 80048 BASIC METABOLIC PNL TOTAL CA: CPT

## 2020-11-24 PROCEDURE — C9113 INJ PANTOPRAZOLE SODIUM, VIA: HCPCS | Performed by: SURGERY

## 2020-11-24 PROCEDURE — 99024 POSTOP FOLLOW-UP VISIT: CPT | Performed by: NURSE PRACTITIONER

## 2020-11-24 PROCEDURE — 74011000250 HC RX REV CODE- 250: Performed by: SURGERY

## 2020-11-24 PROCEDURE — 43644 LAP GASTRIC BYPASS/ROUX-EN-Y: CPT | Performed by: SURGERY

## 2020-11-24 PROCEDURE — 74011250637 HC RX REV CODE- 250/637: Performed by: SURGERY

## 2020-11-24 PROCEDURE — 85025 COMPLETE CBC W/AUTO DIFF WBC: CPT

## 2020-11-24 RX ORDER — ESCITALOPRAM OXALATE 10 MG/1
10 TABLET ORAL DAILY
Status: DISCONTINUED | OUTPATIENT
Start: 2020-11-24 | End: 2020-11-24 | Stop reason: HOSPADM

## 2020-11-24 RX ORDER — OXYCODONE HYDROCHLORIDE 5 MG/1
5 TABLET ORAL
Qty: 18 TAB | Refills: 0 | Status: SHIPPED | OUTPATIENT
Start: 2020-11-24 | End: 2020-11-27

## 2020-11-24 RX ORDER — ONDANSETRON 4 MG/1
4 TABLET, ORALLY DISINTEGRATING ORAL
Qty: 12 TAB | Refills: 0 | Status: SHIPPED | OUTPATIENT
Start: 2020-11-24 | End: 2022-02-21

## 2020-11-24 RX ORDER — ENOXAPARIN SODIUM 100 MG/ML
40 INJECTION SUBCUTANEOUS DAILY
Qty: 7 SYRINGE | Refills: 0 | Status: ON HOLD | OUTPATIENT
Start: 2020-11-25 | End: 2022-02-16

## 2020-11-24 RX ADMIN — ACETAMINOPHEN 650 MG: 325 TABLET ORAL at 03:39

## 2020-11-24 RX ADMIN — ACETAMINOPHEN 650 MG: 325 TABLET ORAL at 09:06

## 2020-11-24 RX ADMIN — ENOXAPARIN SODIUM 40 MG: 40 INJECTION SUBCUTANEOUS at 09:06

## 2020-11-24 RX ADMIN — OXYCODONE HYDROCHLORIDE 5 MG: 5 TABLET ORAL at 00:01

## 2020-11-24 RX ADMIN — SODIUM CHLORIDE 40 MG: 9 INJECTION, SOLUTION INTRAMUSCULAR; INTRAVENOUS; SUBCUTANEOUS at 09:06

## 2020-11-24 NOTE — DISCHARGE SUMMARY
Bariatric Surgery Discharge Progress Note    Admission Date: 11/23/2020    Discharge Date: 11/24/2020    PREOPERATIVE DIAGNOSES:  Clinically severe obesity with a body mass index of 49 and obesity-related comorbidities consisting of hypercholesterolemia, clinical obstructive sleep apnea, bilateral carpal tunnel syndrome, polycystic ovarian syndrome, and weight-related arthropathy of her back and knees.     POSTOPERATIVE DIAGNOSES:  1. Intraabdominal adhesive disease. 2.  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     PROCEDURES PERFORMED:  1. Laparoscopic lysis of adhesions. 2.  Laparoscopic Danielle-en-Y gastric bypass utilizing a 15 mL separate tubularized gastric pouch placed on the lesser curvature of the stomach, a 186-KW retrocolic retrogastric Danielle limb, and a 40-cm biliopancreatic limb. Postop Complications: none    Hospital Course:  Patient was admitted on 11/23/2020 for scheduled bariatric surgery. Operation was without significant complication. Patient admitted to the floor postoperatively, monitored as per protocol. Diet sequentially advanced beginning POD 1, pain medications transitioned to oral during the hospital course. Currently the patient is afebrile, vital signs stable, tolerating a clear liquid diet with protein supplementation, voiding spontaneously, ambulatory with adequate pain control with oral medications and clear surgical sites without evidence of infection. Discharge Diet:  Clear Liquid Bariatric Diet for 7 days, then soft moist protein diet for 5 weeks    Discharge Medications:  Current Discharge Medication List      START taking these medications    Details   enoxaparin (LOVENOX) 40 mg/0.4 mL 0.4 mL by SubCUTAneous route daily.  Indications: deep vein thrombosis prevention, treatment to prevent a blood clot in the lung  Qty: 7 Syringe, Refills: 0      oxyCODONE IR (ROXICODONE) 5 mg immediate release tablet Take 1 Tab by mouth every four (4) hours as needed for Pain for up to 3 days. Max Daily Amount: 30 mg.  Qty: 18 Tab, Refills: 0    Associated Diagnoses: Post-op pain; Incisional pain      ondansetron (ZOFRAN ODT) 4 mg disintegrating tablet Take 1 Tab by mouth every eight (8) hours as needed for Nausea or Vomiting. Indications: prevent nausea and vomiting after surgery  Qty: 12 Tab, Refills: 0         CONTINUE these medications which have NOT CHANGED    Details   escitalopram oxalate (Lexapro) 10 mg tablet Take 10 mg by mouth daily.          STOP taking these medications       metFORMIN (GLUCOPHAGE) 500 mg tablet Comments:   Reason for Stopping:                 Bariatric Chewable vitamins, 2 orally daily for life  Calcium Citrate 1500 mg orally daily for life  Vitamin B12 1000 micrograms sublingual daily for life  Vitamin D3 5,000 units orally daily for life   Vitamin B1 100 mg orally daily for life    Discharge disposition: home    Discharge condition: stable      Local wound care with daily showers, keep wounds clean and dry     Activity: as desired, no lifting, pushing, or pulling greater than 15lbs or situps for 30 days     Special Instructions:            - No driving until activity is not influenced by incisional pain and off narcotics            - No bath or hot tub until wounds are healed            - Check pulse and temperature twice daily for 10 days            - Notify [facility] for a Temp >100.5 or Pulse>115     Follow-up with your surgeon in 2 weeks, call office for appointment [phone] ([address] [place] [address]) [phone]([address])

## 2020-11-24 NOTE — PROGRESS NOTES
Surgery Progress Note    11/24/2020    Admit Date: 11/23/2020    Subjective:     Patient has complaints of pain Pain is controlled with current plan. Patient has been ambulating in halls. She reports no nausea and no vomiting. Bowel Movements: None    Objective:     Patient Vitals for the past 24 hrs:   Temp Pulse Resp BP SpO2   11/24/20 0751 98.4 °F (36.9 °C) 84 16 120/74 98 %   11/24/20 0419 99.4 °F (37.4 °C) 89 16 (!) 158/75 95 %   11/24/20 0027 99.9 °F (37.7 °C) (!) 104 16 134/87 96 %   11/23/20 2106 98.5 °F (36.9 °C) (!) 104 16 (!) 152/89 96 %   11/23/20 2013 98.5 °F (36.9 °C) (!) 118 16 (!) 154/82 96 %   11/23/20 1758 98.9 °F (37.2 °C) 98 18 139/88 94 %   11/23/20 1622 98.4 °F (36.9 °C) 88 18 (!) 138/91 96 %   11/23/20 1416 98.1 °F (36.7 °C) 82 18 139/85 96 %   11/23/20 1359 98.4 °F (36.9 °C) -- -- -- --   11/23/20 1343 -- 72 23 (!) 143/75 95 %   11/23/20 1333 -- 78 20 (!) 141/74 96 %   11/23/20 1323 -- 74 17 (!) 143/75 95 %   11/23/20 1313 -- 76 19 (!) 144/76 95 %   11/23/20 1248 98 °F (36.7 °C) 85 18 (!) 159/90 99 %   11/23/20 1014 99 °F (37.2 °C) 94 20 (!) 133/92 96 %     Date 11/23/20 0700 - 11/24/20 0659 11/24/20 0700 - 11/25/20 0659   Shift 5829-2650 3693-9473 24 Hour Total 2948-8494 5757-1857 24 Hour Total   INTAKE   P.O. 30 60 90        P. O. 30 60 90      I. V.(mL/kg/hr) 1000(0.7)  1000(0.4)        Volume (lactated Ringers infusion) 1000  1000      Shift Total(mL/kg) 1030(8.7) 60(0.5) 1090(9.2)      OUTPUT   Urine(mL/kg/hr) 400(0.3)  400(0.1)        Urine Voided 300  300        Urine Output 100  100      Blood 25  25        Estimated Blood Loss 25  25      Shift Total(mL/kg) 425(3.6)  425(3.6)       60 665      Weight (kg) 118.8 118.8 118.8 118.8 118.8 118.8       EXAM: GENERAL: alert, pleasant, no distress   HEART: regular rate and rhythm   LUNGS: clear to auscultation   ABDOMEN:  Soft, obese, mild incisional tenderness, distended, incisions clean, dry, no erythema or drainage   EXTREMITIES: warm, well perfused    Data Review    Recent Results (from the past 24 hour(s))   HCG URINE, QL. - POC    Collection Time: 11/23/20  9:44 AM   Result Value Ref Range    Pregnancy test,urine (POC) Negative NEG         Assessment:   Charly Maharaj is a 31 y.o. female,  day 1 status post Laparoscopic lysis of adhesions. 2.  Laparoscopic Danielle-en-Y gastric bypass utilizing a 15 mL separate tubularized gastric pouch placed on the lesser curvature of the stomach, a 865-YL retrocolic retrogastric Danielle limb, and a 40-cm biliopancreatic limb.   Condition: good    Plan:   Labs pending     -Ambulate every four hours  - Pain managed with Toradol, PO Tylenol, PO Roxicodone prn pain not controlled by previous prior to d/c   -Nausea managed prior to d/c   -Advance to Clear liquid Gastric Bypass Diet, if able to tolerate clear liquid diet (with pro shake) 4oz per hour for 3 hours prior to d/c    If patient continue to progress d/c home later today     Herminia Acevedo NP  9:33 AM  11/24/2020

## 2020-11-24 NOTE — OP NOTES
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 POSTOPERATIVE DIAGNOSES:  1. Intraabdominal adhesive disease. 2.  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 PROCEDURES PERFORMED:  1. Laparoscopic lysis of adhesions. 2.  Laparoscopic Danielle-en-Y gastric bypass utilizing a 15 mL separate tubularized gastric pouch placed on the lesser curvature of the stomach, a 753-VU retrocolic retrogastric Danielle limb, and a 40-cm biliopancreatic limb. SURGEON:  Momo Puentes. Zara Chung DO.    FIRST ASSISTANT:  Laura Park SA.    ANESTHESIA:  General endotracheal supplemented at the conclusion of the operative case with local infiltration of the operative sites with 266 mg of Exparel diluted in 50 mL of normal saline solution. COMPLICATIONS: None. SPECIMENS REMOVED:  None. IMPLANTS: None. ESTIMATED BLOOD LOSS:  Less than 25 mL. OPERATIVE FINDINGS:  The patient had both omental adhesions and uterine adhesions to the anterior abdominal wall secondary to previous  section.     INDICATIONS FOR SURGICAL PROCEDURE:  This is a 63-year-old white female who has failed medical management of her clinically severe obesity and after investigation of the surgical options available for definitive weight loss, has elected to proceed with laparoscopic potentially open Danielle-en-Y gastric bypass to achieve definitive durable weight loss on a personal level with expected resolution of obesity-related comorbidities. The risks and benefits of the operative versus nonoperative potential alternatives and potential complications up to and including death were extensively discussed with the patient prior to the surgical procedure, who voiced her understanding and wished to proceed. DESCRIPTION OF PROCEDURE:  The patient received Ancef for antibiotic prophylaxis and Lovenox for DVT prophylaxis in the preoperative staging area. After signing her operative permit, which was properly witnessed, she was then transported to the operating room, where she was placed in the supine position on the operating table and SCDs were placed and inflated prior to the induction of general endotracheal anesthesia. A 16-Irish Page catheter was then placed atraumatically to gravity drainage as was a 34-Irish orogastric tube for gastric decompression. The abdomen was then prepped and draped in the usual sterile fashion. A time-out procedure was performed according to the protocol and agreed upon by all personnel in the operating suite. A transverse 12-mm cutaneous incision was then made just superior to the umbilicus in the midline through which, a 12-mm Optiview trocar and cannula were placed into the peritoneal cavity under direct vision utilizing a 10-mm 0-degree laparoscope. The laparoscope and trocar were removed. The abdomen was then insufflated with carbon dioxide gas, never exceeding a pressure of 15 mmHg and a 30-degree 10-mm laparoscope was then placed and utilized for the remainder of the procedure. The remaining ports were then placed through transverse cutaneous incisions utilizing Optiview trocars and cannulas under direct vision. The second was a 5-mm incision located in the left anterior axillary line two fingerbreadths below the left costal margin through which, a 5-mm trocar and cannula were placed. The third, a 12-mm cutaneous incision midway between the left upper quadrant and the supraumbilical port through which a 12-mm trocar and cannula were placed and the fourth in the right paramedian location, 8 cm from the midline, midway between the costal margin and the level of the umbilicus utilizing a 54-UY cutaneous incision and a 12-mm trocar and cannula. The laparoscope was then utilized to visualize the upper abdomen, which was noted to have no abnormalities present. The omentum and transverse colon were attempted to be translocated superiorly. This was unable to be accomplished secondary to adhesive disease and the laparoscope was shifted to the left mid abdominal port to visualize the lower abdomen. It was found that the patient had omental and uterine adhesions to the anterior abdominal wall in the region of previous  section. These were taken down utilizing sharp electrocautery and Harmonic scalpel dissection. Laparoscope was then shifted back to the supraumbilical port for the remainder of the procedure. The omentum and transverse colon were reflected superiorly. The ligament of Treitz was identified and 40 cm distal to ligament of Treitz, the jejunum was transected with a triple-load stapling device, 60 mm in length, loaded with 2.5-mm staples. The mesentery was then transected down to its base utilizing a Harmonic scalpel. The Danielle limb was measured to be 100 cm in length. At this point, on its antimesenteric border, an enterotomy was created with Harmonic scalpel. A similar antimesenteric enterotomy was created 2 cm proximal to the staple line of the biliopancreatic limb, and a stapled side-to-side functional end-to-side jejunojejunostomy was constructed utilizing a triple-load stapling device, 60 mm in length, loaded with 2.5-mm staples, introducing one arm of the stapling device into each of the respective limbs prior to firing it on their antimesenteric borders.   The remaining enteric defect was then closed with a running full-thickness 3-0 Vicryl suture and the mesentery was closed with a running 2-0 silk suture. The ligament of Treitz was re-identified. Just anterior to the ligament of Treitz, the fenestration was created through the mesocolon utilizing a Harmonic scalpel into the lesser sac through which the limb was placed into the lesser sac. The omentum and transverse colon were reflected back to their normal anatomic positions. The oliver was  identified along the lesser curvature of the stomach. Just inferior to the oliver along the greater curvature of the stomach, the omentum was  from the gastric wall utilizing the Harmonic scalpel until the Danielle limb was visualized within the lesser sac and a transverse 5-mm cutaneous incision was then made one-third the distance from the xiphisternal junction to the umbilicus through which a trocar without a cannula was placed into the peritoneal cavity and withdrawn to be replaced with 5-mm atraumatic grasping forceps, which was utilized in conjunction with a self-retaining retractor to elevate the left lobe of the liver and provide hiatal exposure. The peritoneum overlying the angle of His was then opened with Harmonic scalpel; 5 cm distal to the GE junction along the lesser curvature of the stomach, the neurovascular elements of the lesser omentum were  from the gastric wall utilizing the Harmonic scalpel. Completion of this lesser curvature fenestration was accomplished utilizing an esophageal retractor introduced posterior to the stomach through the omental fenestration along the greater curvature of the stomach. A 34-Amharic orogastric tube was retracted into the esophagus.   A triple-load stapling device, 60 mm in length, loaded with 2.5-mm staples was introduced into the lesser curvature fenestration, oriented perpendicular to the lesser curve 5 cm distal to the GE junction prior to firing two-thirds of length of the staple load. The 34-Burmese orogastric tube was then advanced utilizing the sizing template for construction of the tubularized gastric pouch. The initial staple load was a triple-load stapling device, 60 mm in length, loaded with 2.5 mm staples utilizing two-thirds of length of the staple load. The remainder of the vertical aspect of the pouch was accomplished with sequential firings of a triple-load stapling device, 60 mm in length, loaded with 3.5-mm staples ending the transection at the angle of His. It should be noted that the initial full-length staple load utilized a Seamguard buttressing strip and this created a 15 mL separate tubularized gastric pouch based on the lesser curvature of the stomach. The Danielle limb was then elevated posterior to the stomach in the retrogastric position, where a tension-free hand-sewn two-layered gastrojejunostomy was constructed. The geometric orientation of the gastrojejunostomy was at the distal aspect of the tubularized gastric pouch to the antimesenteric border of the Danielle limb 2 cm distal to the staple line. A posterior row of running seromuscular 3-0 Vicryl suture was placed. A transverse 12-mm gastrotomy was made at the distal aspect of the tubularized gastric pouch with an adjacent antimesenteric 12-mm Danielle limb enterotomy. The running inner layer of full-thickness 3-0 Vicryl suture was initiated in the left lateral posterior aspect of the anastomosis running across the posterior aspect of the anastomosis around the right lateral aspect of the anastomosis to the anterior midline. A second full-thickness 3-0 Vicryl suture was placed adjacent to the origin of the first and running around the left lateral aspect of the anastomosis to the anterior midline. The 34-Burmese orogastric tube was advanced across the gastrojejunal anastomosis into the Danielle limb prior to tying with running inner layer of full-thickness Vicryl suture together in the anterior midline. The gastrojejunal anastomosis was completed anteriorly utilizing a running seromuscular 3-0 Vicryl suture. The 34-Zimbabwean orogastric tube was removed and after assuring there was adequate redundancy of the Danielle limb above the level of the mesocolon, the omentum and transverse colon were reflected superiorly. The space through which a Ramirez's hernia might occur was closed with a running 2-0 silk suture and the mesocolic defect was closed with a series of simple interrupted 2-0 silk suture. The omentum and transverse colon were reflected back to their normal anatomic  position. The Danielle limb was noted to be viable with adequate density above the level of the mesocolon and there was no tension noted at the gastrojejunal anastomosis. The laparoscope was then shifted to the left mid abdominal port to allow closure of the supraumbilical fascial trocar defect with a suture passing device and a #2 Vicryl suture. Operative sites were inspected and noted to be hemostatic. The abdomen was then desufflated of carbon dioxide gas. The trocars were removed under direct vision. The fascial suture was tied and the cutaneous closure was accomplished with a series of simple interrupted buried deep dermal 4-0 Monocryl sutures. The wounds were then infiltrated with 266 mg of Exparel diluted in 50 mL of normal saline solution and Steri-Strips were applied as were sterile dressings. Sponge and needle counts were correct both during and at the completion of the procedure. The patient tolerated the procedure without operative complications, subsequently was extubated and transported to the recovery room in awake, alert, and stable condition. The estimated blood loss was less than 25 mL. The patient received 1 liter of crystalloid during the procedure and had a urine output of 100 mL. Operative start time 1051, completion time 1231.         Natividad Mtz DO      [id number]  D:  11/23/2020 13:11  T:  11/24/2020 3:45  JOB #:  [id number]

## 2020-11-24 NOTE — ROUTINE PROCESS
Pt given discharge instructions. Pt verbalized understanding.  IV d/cd no signs of infection noted. Armbands removed and shredded. Each discharge page verified that it matched pts name. Pt d/cd to home. Pt stable.

## 2020-11-24 NOTE — PROGRESS NOTES
conducted an initial consultation and Spiritual Assessment for Archana Luna, who is a 30 y.o.,female. Patients Primary Language is: [place]. According to the patients EMR Voodoo Affiliation is: No preference. The reason the Patient came to the hospital is:   Patient Active Problem List    Diagnosis Date Noted    Gastric bypass status for obesity 11/24/2020    Morbid obesity with BMI of 45.0-49.9, adult ([facility] 11/23/2020    Obesity, morbid ([facility] 02/25/2020        The  provided the following Interventions:  Initiated a relationship of care and support. Explored issues of sherwin, belief, spirituality and Jew/ritual needs while hospitalized. Listened empathically. Offered prayer and assurance of continued prayers on patient's behalf. Chart reviewed. The following outcomes where achieved:  Patient shared limited information about both their medical narrative and spiritual journey/beliefs. Patient processed feeling about current hospitalization. Patient expressed gratitude for 's visit. Assessment:  Patient does not have any Jew/cultural needs that will affect patients preferences in health care. There are no spiritual or Jew issues which require intervention at this time. Plan:  Chaplains will continue to follow and will provide pastoral care on an as needed/requested basis.  recommends bedside caregivers page  on duty if patient shows signs of acute spiritual or emotional distress.       [address] [facility]   [phone]

## 2020-11-24 NOTE — ROUTINE PROCESS
Bedside shift change report given to Loyd Higgins (oncoming nurse) by Rosanna Pickens RN (offgoing nurse). Report included the following information SBAR, Kardex, Procedure Summary, Intake/Output, MAR and Recent Results.

## 2020-11-24 NOTE — PROGRESS NOTES
Patient was educated on all discharge instructions below. He/she understood and was provided a copy. He/she knows who to call for any issues post discharge. Hydration  Hydration is your NUMBER ONE priority. Dehydration is the most common reason for readmission to the hospital. Dehydration occurs when  your body does not get enough fluid to keep it functioning at its best. Your body also requires fluid  to burn its stored fat calories for energy. Carry a bottle of water with you all day, even when you are away from home; remind yourself to  drink even if you dont feel thirsty. Drinking 64 ounces of fluid is your daily goal. You can tell if  youre getting enough fluid if youre making clear, light-colored urine five to 10 times per day. Signs of dehydration can be thirst, headache, hard stools or dizziness upon sitting or standing up. You should contact your surgeons office if you are unable to drink enough fluid to stay hydrated. --   [address] after Surgery   No lifting over 15 pounds for four weeks.  No driving while taking the pain medication (about seven to 10 days).  No tub baths, swimming or hot tubs until incisions are healed (about two weeks).  You may shower. Clean incisions daily /gently with soap and check incisions for signs of infection:  -- Redness around incision. -- Swelling at site. -- Drainage with an foul odor (pus). -- Increase tenderness around incision.  Take your temperature and resting pulse in the morning and evening. Record on tracking form  given to you. Call if your temperature is greater than 101 or your pulse rate is greater than 115.  Please contact your surgeon if you are having excessive abdominal pain (that lasts longer than  four hours and does not improve with prescribed pain medication), vomiting or shortness of breath.  Get up and move -- do not sit in one place for more than an hour.    You need to WALK (EXERCISE) for 30 minutes per day. -- Walking around your house does not count. -- Bike, treadmill and elliptical are OK. -- NO weight lifting or sit ups.  If constipated take an adult dose of Miralax (available over the counter). Contact the doctors  office if Miralax doesnt help.  You may swallow pills starting the day after surgery as long as they fit inside this [place]:   Continue to use your incentive spirometer (breathing machine) for the next couple of weeks to  help prevent pneumonia. [address]  Temperature/Heart Rate Log  Take your temperature and heart rate (pulse) twice a day for 14 days. Take both in the morning and  evening at about the same time each day (when you wake up and before you go to bed when you  are relaxed). Please contact your doctors office if your:   Temperature is higher than 101 degrees.  Heart rate (pulse) is higher than 115 beats per minute  (normal heart rate is 60 to 100 beats per minute). How to Take Your Heart Rate (Pulse)   Turn your left hand so that your palm is face-up.  With the index and middle fingers of your right  hand, draw a line from the base of your thumb to  just below the crease in your wrist. Your fingers  should nestle just to the left of the large tendon that  pops up when you bend your wrist toward you.  Dont press too hard, that will make the pulse go  away. Use gentle pressure.  Wait. It can take several seconds -- and several micro-adjustments in the placement of your two  fingers on your wrist -- to find your pulse. Just keep moving your fingers down or up your wrist  in small increments (and pausing for a few seconds) until you find it. How to Take Your Pulse Rate   Find a watch with a second hand and place it on your right wrist or on the table next  to your left hand.  After finding your pulse, count the number of beats for 20 seconds.    Multiply by three to get your heart rate, or beats per minute  (or just count for 60 seconds for a math-free option).  Normal, resting heart rate is about 60 to 100 beats per minute. -- 55 --  PATIENT GUIDE TO [address]  Lovenox Self Injection Guide  Prepare  Step 1: Wash and dry your hands thoroughly. Step 2: Sit or lie in a comfortable position and choose an area  on the right or left side of the abdomen at least two inches away  from the belly button. Step 3: Clean the injection site with an alcohol swab and let dry. Inject  Step 4: Remove the needle cap by pulling it straight off the syringe and  discard it in a sharps . Step 5: With your other hand, pinch an inch of the cleansed area to  make a fold in the skin. Insert the full length of the needle straight  down -- at a 90? angle -- into the fold of skin. -- 50 --  PATIENT GUIDE TO [address]  Step 6: Press the plunger with your thumb until the syringe is empty. Then pull the needle straight out and release the skin fold. Dispose  Step 7: Point the needle down and away from yourself and others,  and then push down on the plunger to activate the safety shield. Step 8: Place the used syringe in the sharps . Do NOT expel the air bubble from the syringe before the injection. Administration should be alternated between the left and right abdominal wall. The whole length  of the needle should be introduced into a skin fold held between the thumb and forefinger; the  skin fold should be held throughout the injection. To minimize bruising, do not rub the injection  site after completion of the injection. Questions about LOVENOX? Call [phone]  -- 49 --  PATIENT GUIDE TO [address] [place] [address]  9.  DIET AND LIFESTYLE  Key Diet Principles Following Bariatric Surgery   Begin each meal with soft moist high protein foods (i.e. chicken, turkey, yogurt, tuna, eggs,  cottage cheese, other fish and seafood).  Consume a minimum of 64 ounces of fluid each day to prevent dehydration. No straws.  No food and fluid together. Stop drinking 30 minutes before a meal. You may begin fluids again  30 minutes after you finish a meal.   Eat very slowly and chew all foods completely.  Keep portions small.  No simple sugars or high fat foods. No carbonated beverages. No caffeine.  Eat three meals per day. No skipping. Avoid snacking between meals.  No alcohol. No smoking.  Two Flintstones Complete Chewable vitamins each day. Take one in the morning and one at night.  1,500 milligrams Calcium Citrate per day in separate dosages.  Vitamin D 3: 5,000 IU taken per day.  Vitamin B-12: Take 1,000 micrograms sublingually daily.  Iron: 60 milligrams per day from [facility] [employer].  Protein supplements of your choice. Must be low sugar (0 to 3 grams), low fat (0 to 3 grams) and  provide at least 35 to 40 grams of protein each day. You need 60 to 70 grams of protein (food  and supplements) each day.  Minimum of 30 minutes of physical activity daily. Do not eric NSAIDS . Do not take Steroids without your surgeons permission. -- 48 --  [address] [facility]  Your Priorities After Surgery  ? Fluid: 64 ounces of fluid per day. ? Protein: 60 to 70 grams of protein per day. ? Walk every day. Clear Liquid Diet  One week of clear liquids: minimum of 64 ounces of fluid per day. Fluid:   Zero calorie liquids.  No caffeine.  No carbonation.  No sugary drinks.  No alcohol.  No straws. Food   Protein drinks.  Less than 3 grams of sugar and  3 grams of fat per serving.  Protein drink should provide you with  60 to 70 grams of protein. Soft Protein Diet  Five weeks of soft protein (1 ounce for soft protein, 3 ounces of yogurt/cottage cheese). Three meals per day and 1 protein shake.  Protein shakes should provide you with 30 grams of  protein on the soft protein diet. Slow transition:   First week on soft protein diet -- focus on yogurt, cottage cheese, eggs, vegetarian refried beans,  black beans, kidney beans and white beans. (NO BAKED BEANS.)   Second through fourth week on soft protein diet -- focus on yogurt, cottage cheese, eggs,  canned tuna, canned chicken, tilapia and fish (needs to be soft enough to be cut up with a fork).  Fifth week on soft protein diet -- focus on yogurt, cottage cheese, eggs, canned tuna, canned  chicken, tilapia, fish, salmon, chicken breast or turkey. Fluid is your #1 Priority! Continue clear liquids between meals. You will need 64 ounces of fluid per day. Fluids that you can have include:   Water.  Zero calorie liquids. You will need to sip throughout the day and should therefore have a water bottle with you at all  times! No liquids with your meals. Stop 30 minutes before a meal and wait 30 minutes after a meal.  No straws. Zero calorie liquids. No caffeine. No carbonation. No sugary drinks. No alcohol. -- 46 --  [facility]  Protein  You will need 60 to 70 grams of protein per day.  60 to 70 grams of protein shakes when on the clear liquid diet (two to three shakes per day).  30 to 50 grams of protein shakes when on the soft protein diet (one shake per day). Eat Three Times Per Day  You will need to eat three times per day. My planned times are:  _________________________________________________________  _________________________________________________________  _________________________________________________________  Nausea, Vomiting, Stomach Pain  If you have problems with nausea, vomiting or stomach pain, try:   Eating slowly: 20 to 30 minutes per meal.   Chewing food thoroughly: 20 to 30 chews before food is swallowed.  Small portions: measure portions in medicine cup.  Stopping before feeling full.    AVOIDING SUGAR and FRIED FOOD: sugar will cause dumping syndrome and lead to weight gain. Exercise  I will need to get a minimum of 30 minutes of exercise per day or 150 minutes of exercise per week.  Walking, swimming, biking or elliptical.   Find something you enjoy! Vitamins  After surgery, you will need to take the following vitamins for the rest of your life -- FOREVER.  Vitamin D 3: 5,000 IU per day.  Calcium Citrate: 1,500 milligrams, taken separately.  Flintstones Complete: two per day, taken separately.  Sublingual Vitamin B-12: 1,000 micrograms daily.  Iron for menstruating women or patients with a history of low iron: 65 milligrams daily. We recommend going to [email or web address]. [employer]VM and purchasing iron from there. The lemon-lime has 60 milligrams. This iron is better absorbed than over-the-counter iron. -- 46 --  PATIENT GUIDE TO BARIATRIC [address] [place] [address] Rd  Clear Liquid Log  Getting your fluid in is top priority during this week. Fluids (MINIUM of 64 ounces per day):  ? 8 oz. ? 8 oz. ? 8 oz. ? 8 oz. ? 8 oz. ? 8 oz. ? 8 oz. ? 8 oz. ? 8 oz. ? 8 oz. ? 8 oz. ? 8 oz. Flintstones Complete Chewable: ? a.m. ? p.m. Calcium Citrate (1,500 milligrams/day):  Pill form  ? Two crushed pills (morning) ? Two crushed pills (afternoon) ? Two crushed pills (evening)  OR Upcal D (powder)  ? One pack/scoop ? One pack/scoop ? One pack/scoop  OR Celebrate Chewable Vitamins (500 mg each) or Bariat[facility] Advantage Chewables (500 mg)  ? One chewable (morning) ? One chewable (afternoon) ? One chewable (evening)  OR Liquid Calcium Citrate  ? 1 tbsp. Calcium Citrate ? 1 tbsp. Calcium Citrate ? 1 tbsp. Calcium Citrate  Vitamin D3: ? 5,000 IU daily. Vitamin B-12: ? 1,000 micrograms per day. Iron (menstruating women or patients with a history of low iron):  ? 60 milligrams per day from [facility] [employer]. Protein drinks (protein drinks should be under 3 grams of sugar and 3 grams of fat).   Protein shake (60 grams per day): ? a.m. ? p.m. Exercise: ? 30 to 40 minutes per day. -- 48 --  PATIENT GUIDE TO BARIATRIC [address]  Bariatric Soft and Moist Diet Shopping List   alcium Citrate (1,500 milligrams/day):  Pill form  ? Two crushed pills (morning) ? Two crushed pills (afternoon) ? Two crushed pills (evening)  OR Upcal D (powder)  ? One pack/scoop ? One pack/scoop ? One pack/scoop  OR Celebrate Chewable Vitamins (500 mg each) or Bariat[facility] Advantage Chewables (500 mg)  ? One chewable (morning) ? One chewable (afternoon) ? One chewable (evening)  OR Liquid Calcium Citrate  ? 1 tbsp. Calcium Citrate ? 1 tbsp. Calcium Citrate ? 1 tbsp. Calcium Citrate  Vitamin D3: ? 5,000 IU daily  Vitamin B-12: ? 1,000 micrograms per day  Iron (menstruating women or  patients with a history of low iron):  ? 60 milligrams per day  from [facility] [employer]  Protein drinks (protein drinks should be under  3 grams of sugar and 3 grams of fat). Protein shake (30 to 40 grams per day):  ? a.m. ? p.m. Exercise: ? 30 to 40 minutes per  Educated on Diet Progression    [facility] G[facility] Bypass [facility]lee[facility] Dietary Progression    Patient Name:   Date of Surgery: Ice Chips start once admitted on floor. Begin Bariatric Clear Liquid Diet on:     Clear Liquid Diet: 64 oz. of fluid per day  o Low calorie, low sugar, non-carbonated beverages  - Water, Crystal Light, Propel Water, Sugar Free Jell-O, Sugar Free Popsicles, Bouillon  - Start protein supplement during this stage. (60-70 grams per day)  - Getting your fluid in and staying hydrated is your #1 priority! - The clear liquid diet will last for 7 days. Begin Bariatric Soft and Moist on: 12/1  - This stage of the diet will last for 5 weeks, unless otherwise instructed by your surgeon.   - Begin:  1 week post-op   - End:  6 weeks post-op (or when you follow up with the Registered Dietitian)    - Soft, moist, high protein foods: 3 meals per day plus protein supplements. o   Portions should emphasize on soft protein. o Portions will be a MAXIMUM of:  o  1 ounce of solid food  o  2-3 ounces of cottage cheese and yogurt. o Protein supplements should be between meals and provide 30-40 grams per day during soft protein diet. o Continue to get 64 ounces of fluid in per day. - Protein foods that are ok on the Soft and Moist Diet include:  o Slow transition:  o 1st week on soft protein should focus on: Yogurt, cottage cheese, eggs  o 2nd -4th  week on soft protein diet should focus on: yogurt, cottage cheese, eggs, canned tuna, canned chicken, tilapia, fish (needs to be soft enough to be cut up with a fork)  o 5th week on soft protein diet should focus on: Yogurt, cottage cheese, eggs, canned tuna, canned chicken, tilapia, fish, salmon, chicken breast, or turkey. Remember to continue to get 64 ounces of fluid daily on ALL Stages. To be advanced to Bariatric Maintenance Stage of the bariatric diet, follow up with the dietitian 6 weeks post-op, around:         For any additional questions, please refer to your blue binder that was provided to you at the start of the bariatric program.

## 2020-11-24 NOTE — PROGRESS NOTES
Reinforced education below. Patient was educated on progression of diet this AM. Goal of 4 ounces per hour with one ounce being protein was clearly understood. Patient was instructed to go slow with small sips to reach goal. Patient given a report card to record intake. Education completed on I.S use and to ambulate in prieto at least 4 times. Will follow up and check progression.

## 2020-11-24 NOTE — ROUTINE PROCESS
Patient was educated on lovenox injections. 1. Wash and dry hands  2. Sit or lie in comfortable position  3. Choose an area around love handles 3 inches away from [facility]  4. Clean site with alcohol and let dry  5. Remove needle cap pull straight out  6. With your other and pinch an inch of the cleanses area and insert full length of needle straight in 90 degree  7. Press the plunger with your thumb slowly until syringe is empty. Pull needle out and point away from you. Push down on plunger to activate the safety shield. Push hard  8. Place the syringe in a plastic bottle and dispose in trash. 9. Rotate sites  10. Do not remove air from syringe before injection  11.  Do not rub the area

## 2020-12-03 ENCOUNTER — OFFICE VISIT (OUTPATIENT)
Dept: SURGERY | Age: 30
End: 2020-12-03
Payer: COMMERCIAL

## 2020-12-03 VITALS
TEMPERATURE: 97.1 F | BODY MASS INDEX: 46.19 KG/M2 | WEIGHT: 251 LBS | OXYGEN SATURATION: 96 % | HEIGHT: 62 IN | SYSTOLIC BLOOD PRESSURE: 133 MMHG | HEART RATE: 98 BPM | DIASTOLIC BLOOD PRESSURE: 85 MMHG

## 2020-12-03 DIAGNOSIS — K91.2 POSTOPERATIVE INTESTINAL MALABSORPTION: Primary | ICD-10-CM

## 2020-12-03 PROCEDURE — 99024 POSTOP FOLLOW-UP VISIT: CPT | Performed by: SURGERY

## 2020-12-03 RX ORDER — IBUPROFEN 200 MG
CAPSULE ORAL DAILY
COMMUNITY
End: 2022-02-21

## 2020-12-03 RX ORDER — BISMUTH SUBSALICYLATE 262 MG
1 TABLET,CHEWABLE ORAL DAILY
COMMUNITY
End: 2022-02-21

## 2020-12-03 RX ORDER — LANOLIN ALCOHOL/MO/W.PET/CERES
500 CREAM (GRAM) TOPICAL DAILY
COMMUNITY
End: 2022-02-21

## 2020-12-03 RX ORDER — LANOLIN ALCOHOL/MO/W.PET/CERES
CREAM (GRAM) TOPICAL
COMMUNITY
End: 2022-02-21

## 2020-12-03 NOTE — PROGRESS NOTES
Bariatric Follow-Up Evaluation    Marcin Salazar is a 31 y.o. white female status post laparoscopic gastric bypass November 23, 2020 who presents in follow-up noting expected decreasing incisional discomfort no longer requiring analgesics and otherwise without complaint  Tolerant of and compliant with an early post gastric bypass bariatric diet with appropriate early satiety and no specific food intolerances or pathologic emesis. The patient has not attempted high-density carbohydrates and therefore is not experience dumping syndrome  Compliant with multivitamin, calcium citrate, vitamin B1, and vitamin B12 supplementation  Exercise regimen: Walking 30 minutes daily  Comorbidities: Clinical obstructive sleep apnea, weight related arthropathyimproved                           Carpal tunnel syndrome, polycystic ovarian syndrome without change                           Hypercholesterolemianot evaluated  Preoperative weight: 263  Current weight: 251.  : 45. Estimated weight loss: 110  Current weight loss: 11  Goal weight: 153  Percentage weight loss: 12% / 10 days    Weight Loss Metrics 12/3/2020 12/3/2020 11/23/2020 11/20/2020 10/29/2020 10/29/2020 10/13/2020   Pre op / Initial Wt 263 - - - 263 - -   Today's Wt - 251 lb - 262 lb - 268 lb 272 lb   BMI - 45.91 kg/m2 47.92 kg/m2 - - 49.02 kg/m2 49.75 kg/m2   Ideal Body Wt 125 - - - 125 - -   Excess Body Wt 138 - - - 138 - -   Goal Wt 153 - - - 152.6 - -   Wt loss to date 12 - - - -5 - -   % Wt Loss 0.11 - - - -0.05 - -   80% .4 - - - 110.4 - -       No Known Allergies    Current Outpatient Medications on File Prior to Visit   Medication Sig Dispense Refill    multivitamin (ONE A DAY) tablet Take 1 Tab by mouth daily.  calcium citrate 200 mg (950 mg) tablet Take  by mouth daily.  cyanocobalamin (VITAMIN B12) 500 mcg tablet Take 500 mcg by mouth daily.  ferrous sulfate (Iron) 325 mg (65 mg iron) tablet Take  by mouth Daily (before breakfast).  escitalopram oxalate (Lexapro) 10 mg tablet Take 10 mg by mouth daily.  enoxaparin (LOVENOX) 40 mg/0.4 mL 0.4 mL by SubCUTAneous route daily. Indications: deep vein thrombosis prevention, treatment to prevent a blood clot in the lung 7 Syringe 0    ondansetron (ZOFRAN ODT) 4 mg disintegrating tablet Take 1 Tab by mouth every eight (8) hours as needed for Nausea or Vomiting. Indications: prevent nausea and vomiting after surgery 12 Tab 0     No current facility-administered medications on file prior to visit. Past Medical History:   Diagnosis Date    Anxiety     Carpal tunnel syndrome     HLD (hyperlipidemia)     Obesity     PCOS (polycystic ovarian syndrome)        Past Surgical History:   Procedure Laterality Date    HX  SECTION      HX  SECTION      HX TONSIL AND ADENOIDECTOMY      HX TONSILLECTOMY         Social History     Tobacco Use    Smoking status: Never Smoker    Smokeless tobacco: Never Used   Substance Use Topics    Alcohol use: Not Currently     Comment: socially    Drug use: Never       Family History   Problem Relation Age of Onset    Diabetes Mother     Cancer Father        ROS:  General: Negative for fevers, chills, night sweats, fatigue, weight loss, or weight gain. GI: Negative for abdominal pain, change in bowel habits, hematochezia, melena, or GERD. Integumentary: Negative for dermatitis or abnormal moles.     HEENT: Negative for visual changes, vertigo, epistaxis, dysphagia, or hoarseness    Cardiac: Negative for chest pain, palpitations, hypertension, edema, or varicosities    Resp: Negative for cough, shortness of breath, wheezing, hemoptysis, snoring, or reactive airway disease    : Negative for hematuria, dysuria, frequency, urgency, nocturia, or stress urinary incontinence    MSK:  Negative for weakness, joint pain, or arthritis    Endocrine: Negative for diabetes, thyroid disease, polyuria, polydipsia, polyphagia, poor wound, heat intolerance, or cold intolerance. Lymph/Heme: Negative for anemia, bruising, or history of blood transfusions. Neuro:  Negative for dizziness, headache, fainting, seizures, and stroke. Psychiatry:  Negative for anxiety or depression    Physical Exam    Visit Vitals  /85 (BP 1 Location: Right arm, BP Patient Position: Sitting)   Pulse 98   Temp 97.1 °F (36.2 °C)   Ht 5' 2\" (1.575 m)   Wt 113.9 kg (251 lb)   SpO2 96%   BMI 45.91 kg/m²       Nursing note reviewed. General: 31 y.o. female is in no acute distress. Resp: Clear to auscultation bilaterally, no wheezing, rhonchi, or rales, excursions normal and symmetrical.  Cardio: Regular rate and rhythm, no murmurs, clicks, gallops, or rubs. No edema or varicosities. Abdomen: Obese, soft, nontender, nondistended, normoactive bowel sounds, wounds clean dry approximated with appropriate surrounding induration and incisional tenderness without evidence of infectious complications or incisional hernia  Psych: Alert and oriented to person, place, and time.     Impression    Status post laparoscopic gastric bypass November 23, 2020 with appropriate weight loss and comorbidity resolution      Plan    Continue compliance with an early post gastric bypass bariatric diet with advancement with the assistance of bariatric nutrition  Continue compliance with [facility] surgery diet, exercise regimen, vitamin supplementation protocol, encourage monthly attendance at various support group meetings  Follow-up February 2021 with labs for 3-month postoperative bariatric surgical evaluation

## 2020-12-03 NOTE — PROGRESS NOTES
Reema Pablo is a 31 y.o. female (: 1990) presenting to address:    Chief Complaint   Patient presents with    Surgical Follow-up     LGBP done 20       Medication list and allergies have been reviewed with Reema Pablo and updated as of today's date. I have gone over all Medical, Surgical and Social History with Reema Pablo and updated/added the information accordingly. 1. Have you been to the ER, Urgent Care or Hospitalized since your last visit? NO      2. Have you followed up with your PCP or any other Physicians since your procedure/ last office visit?    NO

## 2020-12-21 ENCOUNTER — TELEPHONE (OUTPATIENT)
Dept: SURGERY | Age: 30
End: 2020-12-21

## 2020-12-21 NOTE — TELEPHONE ENCOUNTER
Patient had LGBP on 11/23/20. She called the office today complaining of nausea. Patient states she has been nauseous since 12/16 and its getting worse. She is drinking 25-30 oz of water daily. Patient states she is unable to eat, drink protein shakes, or take vitamins due to being nauseous. Patient denies fever and vomiting.

## 2020-12-23 ENCOUNTER — TELEPHONE (OUTPATIENT)
Dept: SURGERY | Age: 30
End: 2020-12-23

## 2020-12-23 NOTE — TELEPHONE ENCOUNTER
Spoke to pt she states starting having problems with nausea a week ago no fever has only been getting in about 40 ounces fluid and has not been taking protein supplements encouraged to push fluids and to try clear protein supplement also to try taking a zofran and then try soft moist protein call back tomorrow to let us know how she is doing

## 2020-12-24 ENCOUNTER — TELEPHONE (OUTPATIENT)
Dept: SURGERY | Age: 30
End: 2020-12-24

## 2020-12-24 NOTE — TELEPHONE ENCOUNTER
Spoke to pt yesterday complaints of nausea x 1 week no fever pulse rate good no pain has not been getting in 64 ounces of fluid and not taking protein supplement encouraged to push fluids try clear protein supplement and also take zofran as needed instructed to call back tomorrow to let us know how she is doing

## 2021-01-04 ENCOUNTER — HOSPITAL ENCOUNTER (OUTPATIENT)
Dept: BARIATRICS/WEIGHT MGMT | Age: 31
Discharge: HOME | End: 2021-01-04

## 2021-01-04 NOTE — PROGRESS NOTES
[facility]  POST-OP NUTRITION FOLLOW UP    Patient's Name: Mack Gallagher  YOB: 1990  Surgery Date: 2020      Procedure: LGBP    Surgeon: Jameson Burkitt    Height: 62\"     Pre-Op Weight: 263 lbs     Current Weight: 232 lbs  Weight Lost: 31 lbs    BMI:  42.4    Attendance of support group: yes    Complications  Readmittance: no  Reoperations: no  Complications: no  IV Fluids: yes  ER Trips: yes -- nauseous and dehydration    Problem Areas:   Nausea: yes, since . Vomiting: no   Dumping Syndrome: no  Inadequate Protein: yes  Inadequate Fluids: yes  Food Intolerance: not at this time  Hunger: no  Constipation: yes -- fluids intake    Eating 3 Meals/Day: trying to   Portion Size at Meals: maybe 1 oz     Protein from Food: soups, yogurts, eggs, chicken, tender beef jerky    Breakfast: Time: 800  eggs  Lunch: Time: 1230   chicken  Dinner:  Time: 530   soups  In-between eating: protein shakes    Length of time for meals: yes    food/fluids: yes    Fluids: 50 oz/day   Types of Fluids: water    MVI: bariatric advantage MVI    Number/Day: 2   Taken Separately: yes    Vitamin B1: BA  Dosing: not needed    Calcium: yes    Calcium Dosin mg    Taken Separately: yes    Vitamin B12: yes   Vitamin B12 Dosin mcg    Vitamin D: yes     Vitamin D dosin IU    Iron: yes    Iron dosin mg     Protein Supplement: premier protein     Grams of Protein: 30g   Mixed with: premade     Splitting Protein Drink in 1/2: 20 minutes    Timing of Protein Drinks: lunch, trying to get a second one in during dinner  Patient is taking 7 days a week. Exercise: 30 minutes/day of walking. Comments:    Patient is struggling to eat and scared of trying foods. Patient is not ready to start vegetables. Patient was educated on the importance of eating meat and vegetables only.   I have talked with patient about the effects of carbohydrates, not only from a weight management perspective, but also what effects it could have on their blood sugar and what reactive hypoglycemia is. Diet Follow Up:  9 month class scheduled for: will call to schedule.        Azeb Macias RD  1/4/2021

## 2021-02-10 ENCOUNTER — HOSPITAL ENCOUNTER (OUTPATIENT)
Dept: LAB | Age: 31
Discharge: HOME | End: 2021-02-10
Payer: COMMERCIAL

## 2021-02-10 DIAGNOSIS — K91.2 POSTOPERATIVE INTESTINAL MALABSORPTION: ICD-10-CM

## 2021-02-10 LAB
ALBUMIN SERPL-MCNC: 3.6 G/DL (ref 3.4–5)
CHOLEST SERPL-MCNC: 212 MG/DL
ERYTHROCYTE [DISTWIDTH] IN BLOOD BY AUTOMATED COUNT: 13.3 % (ref 11.6–14.5)
HCT VFR BLD AUTO: 45.1 % (ref 35–45)
HDLC SERPL-MCNC: 34 MG/DL (ref 40–60)
HDLC SERPL: 6.2 {RATIO} (ref 0–5)
HGB BLD-MCNC: 14.4 G/DL (ref 12–16)
IRON SERPL-MCNC: 90 UG/DL (ref 50–175)
LDLC SERPL CALC-MCNC: 155 MG/DL (ref 0–100)
LIPID PROFILE,FLP: ABNORMAL
MCH RBC QN AUTO: 30.2 PG (ref 24–34)
MCHC RBC AUTO-ENTMCNC: 31.9 G/DL (ref 31–37)
MCV RBC AUTO: 94.5 FL (ref 74–97)
PLATELET # BLD AUTO: 216 K/UL (ref 135–420)
PMV BLD AUTO: 12.7 FL (ref 9.2–11.8)
RBC # BLD AUTO: 4.77 M/UL (ref 4.2–5.3)
TRIGL SERPL-MCNC: 115 MG/DL (ref ?–150)
VLDLC SERPL CALC-MCNC: 23 MG/DL
WBC # BLD AUTO: 5.5 K/UL (ref 4.6–13.2)

## 2021-02-10 PROCEDURE — 83540 ASSAY OF IRON: CPT

## 2021-02-10 PROCEDURE — 36415 COLL VENOUS BLD VENIPUNCTURE: CPT

## 2021-02-10 PROCEDURE — 84425 ASSAY OF VITAMIN B-1: CPT

## 2021-02-10 PROCEDURE — 82040 ASSAY OF SERUM ALBUMIN: CPT

## 2021-02-10 PROCEDURE — 80061 LIPID PANEL: CPT

## 2021-02-10 PROCEDURE — 85027 COMPLETE CBC AUTOMATED: CPT

## 2021-02-11 ENCOUNTER — OFFICE VISIT (OUTPATIENT)
Dept: SURGERY | Age: 31
End: 2021-02-11
Payer: COMMERCIAL

## 2021-02-11 VITALS
TEMPERATURE: 97 F | DIASTOLIC BLOOD PRESSURE: 83 MMHG | BODY MASS INDEX: 39.56 KG/M2 | HEART RATE: 97 BPM | HEIGHT: 62 IN | OXYGEN SATURATION: 97 % | SYSTOLIC BLOOD PRESSURE: 132 MMHG | WEIGHT: 215 LBS

## 2021-02-11 DIAGNOSIS — K91.2 POSTOPERATIVE INTESTINAL MALABSORPTION: Primary | ICD-10-CM

## 2021-02-11 PROCEDURE — 99024 POSTOP FOLLOW-UP VISIT: CPT | Performed by: SURGERY

## 2021-02-11 NOTE — PROGRESS NOTES
Bariatric Follow-Up Evaluation    Rafael Patel is a 31 y.o. white female who presents in follow-up status post laparoscopic lysis of adhesions, gastric bypass November 23, 2020 without specific complaint complete related to her bariatric surgical procedure. Tolerant of and compliant with a regular bariatric diet with difficulty with adequate protein intake but noting early satiety and no specific food intolerances or pathologic emesis. The patient not attempted high-density carbohydrates and therefore is not experience dumping syndrome  Compliant multivitamin, calcium citrate, vitamin B1, B12, and vitamin D supplementation. Exercise regimen: Walking 30 minutes daily  Comorbidities: Obstructive sleep apnea, carpal tunnel syndrome, PCOSresolved                          Thanks hypercholesterolemia, weight related arthropathyimproved  Preoperative weight: 263. Current weight: 2015 BMI: 39  Estimated postsurgical weight loss: 110  Current weight loss: 48  Goal weight: 153  Percentage weight loss: 43% / 3 months    Weight Loss Metrics 2/11/2021 2/11/2021 12/3/2020 12/3/2020 11/23/2020 11/20/2020 10/29/2020   Pre op / Initial Wt 263 - 263 - - - 263   Today's Wt - 215 lb - 251 lb - 262 lb -   BMI - 39.32 kg/m2 - 45.91 kg/m2 47.92 kg/m2 - -   Ideal Body Wt 125 - 125 - - - 125   Excess Body Wt 138 - 138 - - - 138   Goal Wt - - 153 - - - 152.6   Wt loss to date 48 - 12 - - - -5   % Wt Loss 0.43 - 0.11 - - - -0.05   80% .4 - 110.4 - - - 110.4       No Known Allergies    Current Outpatient Medications on File Prior to Visit   Medication Sig Dispense Refill    multivitamin (ONE A DAY) tablet Take 1 Tab by mouth daily.  calcium citrate 200 mg (950 mg) tablet Take  by mouth daily.  cyanocobalamin (VITAMIN B12) 500 mcg tablet Take 500 mcg by mouth daily.  ferrous sulfate (Iron) 325 mg (65 mg iron) tablet Take  by mouth Daily (before breakfast).       escitalopram oxalate (Lexapro) 10 mg tablet Take 20 mg by mouth daily.  enoxaparin (LOVENOX) 40 mg/0.4 mL 0.4 mL by SubCUTAneous route daily. Indications: deep vein thrombosis prevention, treatment to prevent a blood clot in the lung 7 Syringe 0    ondansetron (ZOFRAN ODT) 4 mg disintegrating tablet Take 1 Tab by mouth every eight (8) hours as needed for Nausea or Vomiting. Indications: prevent nausea and vomiting after surgery 12 Tab 0     No current facility-administered medications on file prior to visit. Past Medical History:   Diagnosis Date    Anxiety     Carpal tunnel syndrome     HLD (hyperlipidemia)     Obesity     PCOS (polycystic ovarian syndrome)        Past Surgical History:   Procedure Laterality Date    HX  SECTION      HX  SECTION      HX TONSIL AND ADENOIDECTOMY      HX TONSILLECTOMY         Social History     Tobacco Use    Smoking status: Never Smoker    Smokeless tobacco: Never Used   Substance Use Topics    Alcohol use: Not Currently     Comment: socially    Drug use: Yes     Types: Marijuana       Family History   Problem Relation Age of Onset    Diabetes Mother     Cancer Father        ROS:  General: Negative for fevers, chills, night sweats, fatigue, weight loss, or weight gain. GI: Negative for abdominal pain, change in bowel habits, hematochezia, melena, or GERD. Integumentary: Negative for dermatitis or abnormal moles.     HEENT: Negative for visual changes, vertigo, epistaxis, dysphagia, or hoarseness    Cardiac: Negative for chest pain, palpitations, hypertension, edema, or varicosities    Resp: Negative for cough, shortness of breath, wheezing, hemoptysis, snoring, or reactive airway disease    : Negative for hematuria, dysuria, frequency, urgency, nocturia, or stress urinary incontinence    MSK:  Negative for weakness, joint pain, or arthritis    Endocrine: Negative for diabetes, thyroid disease, polyuria, polydipsia, polyphagia, poor wound, heat intolerance, or cold intolerance. Lymph/Heme: Negative for anemia, bruising, or history of blood transfusions. Neuro:  Negative for dizziness, headache, fainting, seizures, and stroke. Psychiatry:  Negative for anxiety or depression    Physical Exam    Visit Vitals  /83 (BP 1 Location: Right arm, BP Patient Position: Sitting)   Pulse 97   Temp 97 °F (36.1 °C)   Ht 5' 2\" (1.575 m)   Wt 97.5 kg (215 lb)   LMP 02/04/2021 (Approximate)   SpO2 97%   BMI 39.32 kg/m²       Nursing note reviewed. General: 31 y.o. female is in no acute distress. Resp: Clear to auscultation bilaterally, no wheezing, rhonchi, or rales, excursions normal and symmetrical.  Cardio: Regular rate and rhythm, no murmurs, clicks, gallops, or rubs. No edema or varicosities. Abdomen: Obese, soft, nontender, nondistended, normoactive bowel sounds, no hernias, no hepatosplenomegaly,   Psych: Alert and oriented to person, place, and time. February 10, 2021 CBC, albumin, iron, cholesterol panel, vitamin B1  Vitamin B1 level pending  Cholesterol 212 with remainder laboratory values within normal limits    Impression    51-year-old white female status post laparoscopic gastric bypass November 23, 2020 with appropriate weight loss and comorbidity resolution.       Plan    Continue compliance with a regular bariatric diet increasing protein supplementation, continue vitamin supplementation protocol continue exercise regimen, encourage monthly attendance better support group meetings  Bariatric nutrition follow-up as scheduled  Follow-up May 2021 with 6-month labs for ongoing bariatric follow-up

## 2021-02-11 NOTE — PROGRESS NOTES
Mani Stapleton is a 31 y.o. female (: 1990) presenting to address:    Chief Complaint   Patient presents with    Follow-up     LGBP 20       Medication list and allergies have been reviewed with Mani Stapleton and updated as of today's date. I have gone over all Medical, Surgical and Social History with Mani Stapleton and updated/added the information accordingly. 1. Have you been to the ER, Urgent Care or Hospitalized since your last visit? YES, kidney stone      2. Have you followed up with your PCP or any other Physicians since your procedure/ last office visit?    NO

## 2021-02-13 LAB — VIT B1 BLD-SCNC: 134.7 NMOL/L (ref 66.5–200)

## 2021-10-05 ENCOUNTER — DOCUMENTATION ONLY (OUTPATIENT)
Dept: SURGERY | Age: 31
End: 2021-10-05

## 2021-10-05 NOTE — PROGRESS NOTES
Per [facility] requirements;  E-mail and letter sent for follow up appointment. [facility] [address] 89      Dear Patient,    Your health is our main concern. It is important for your health to have follow-up lab work and to see your surgeon at 3 months, 6 months and annually after your weight loss surgery. Additionally, the Department of bariatric Surgery at our hospital is a member of the Metabolic and Bariatric Surgery Accreditation and Quality Improvement Program [facility]). As a participant in this program, we gather information on the outcomes of our patients after surgery. Please call the office for a follow up appointment at [phone] [facility]) or [phone] [facility]). If you have moved out of the area or have changed surgeons please call us and let us know the name of your doctor. Your health and feedback are important to us. We greatly appreciate your response.        Thank you,  [facility] [address] [facility]

## 2022-02-16 ENCOUNTER — HOSPITAL ENCOUNTER (EMERGENCY)
Age: 32
Discharge: HOME | DRG: 540 | End: 2022-02-16
Attending: OBSTETRICS & GYNECOLOGY | Admitting: OBSTETRICS & GYNECOLOGY
Payer: COMMERCIAL

## 2022-02-16 VITALS
RESPIRATION RATE: 16 BRPM | BODY MASS INDEX: 37.17 KG/M2 | DIASTOLIC BLOOD PRESSURE: 76 MMHG | HEIGHT: 62 IN | SYSTOLIC BLOOD PRESSURE: 111 MMHG | WEIGHT: 202 LBS | TEMPERATURE: 98.6 F | HEART RATE: 94 BPM

## 2022-02-16 PROCEDURE — 59025 FETAL NON-STRESS TEST: CPT

## 2022-02-16 RX ORDER — VITAMIN A ACETATE, BETA CAROTENE, ASCORBIC ACID, CHOLECALCIFEROL, .ALPHA.-TOCOPHEROL ACETATE, DL-, THIAMINE MONONITRATE, RIBOFLAVIN, NIACINAMIDE, PYRIDOXINE HYDROCHLORIDE, FOLIC ACID, CYANOCOBALAMIN, CALCIUM CARBONATE, FERROUS FUMARATE, ZINC OXIDE, CUPRIC OXIDE 3080; 12; 120; 400; 1; 1.84; 3; 20; 22; 920; 25; 200; 27; 10; 2 [IU]/1; UG/1; MG/1; [IU]/1; MG/1; MG/1; MG/1; MG/1; MG/1; [IU]/1; MG/1; MG/1; MG/1; MG/1; MG/1
1 TABLET, FILM COATED ORAL DAILY
COMMUNITY

## 2022-02-16 RX ORDER — LANOLIN ALCOHOL/MO/W.PET/CERES
400 CREAM (GRAM) TOPICAL DAILY
COMMUNITY

## 2022-02-16 NOTE — PROGRESS NOTES
Patient presents to labor and delivery twin gestation  32weeks 6days for complaints of decreased fetal movement of baby B. Patient has history of gastric bypass and previous . Via Sherrie Ramírez with Dr. Abel Glez made aware of reactive NST of both Baby A and Baby B    1700- Discharge instructions given to patient, patient verbalized understanding.

## 2022-02-16 NOTE — DISCHARGE INSTRUCTIONS
Prenatal Discharge Instructions  Follow up appointment: Ensure to keep your scheduled appointment with your OB     Diet: As tolerated, ensure to drink plenty of fluids especially water to remain hydrated     Activity: As tolerated, elevate feet and legs while sitting to help keep swelling to a minimum     Medications:  As prescribed     Call your physician or return to Labor and Delivery if any of the following symptoms occur:   Signs of labor (contractions every 5-10 minutes for 1 hour)   Vaginal bleeding   Rupture of amniotic membranes (water breaks)   Fever   Decreased fetal movement (less than 5-10 movements in 1 hour)

## 2022-02-18 ENCOUNTER — HOSPITAL ENCOUNTER (INPATIENT)
Age: 32
LOS: 3 days | Discharge: HOME | DRG: 540 | End: 2022-02-21
Attending: OBSTETRICS & GYNECOLOGY | Admitting: OBSTETRICS & GYNECOLOGY
Payer: COMMERCIAL

## 2022-02-18 ENCOUNTER — ANESTHESIA (OUTPATIENT)
Dept: LABOR AND DELIVERY | Age: 32
DRG: 540 | End: 2022-02-18
Payer: COMMERCIAL

## 2022-02-18 ENCOUNTER — ANESTHESIA EVENT (OUTPATIENT)
Dept: LABOR AND DELIVERY | Age: 32
DRG: 540 | End: 2022-02-18
Payer: COMMERCIAL

## 2022-02-18 DIAGNOSIS — Z98.891 S/P EMERGENCY CESAREAN SECTION: Primary | ICD-10-CM

## 2022-02-18 PROBLEM — O30.049 TWIN PREGNANCY, TWINS DICHORIONIC AND DIAMNIOTIC: Status: ACTIVE | Noted: 2022-02-18

## 2022-02-18 PROBLEM — O42.111 PRETERM PREMATURE RUPTURE OF MEMBRANES (PPROM) WITH ONSET OF LABOR AFTER 24 HOURS OF RUPTURE IN FIRST TRIMESTER, ANTEPARTUM: Status: ACTIVE | Noted: 2022-02-18

## 2022-02-18 PROBLEM — Z3A.33 33 WEEKS GESTATION OF PREGNANCY: Status: ACTIVE | Noted: 2022-02-18

## 2022-02-18 LAB
ABO + RH BLD: NORMAL
BLOOD GROUP ANTIBODIES SERPL: NORMAL
C TRACH RRNA SPEC QL NAA+PROBE: NEGATIVE
ERYTHROCYTE [DISTWIDTH] IN BLOOD BY AUTOMATED COUNT: 12.1 % (ref 11.6–14.5)
HCT VFR BLD AUTO: 33.6 % (ref 35–45)
HGB BLD-MCNC: 11.1 G/DL (ref 12–16)
MCH RBC QN AUTO: 29.4 PG (ref 24–34)
MCHC RBC AUTO-ENTMCNC: 33 G/DL (ref 31–37)
MCV RBC AUTO: 88.9 FL (ref 78–100)
N GONORRHOEA RRNA SPEC QL NAA+PROBE: NEGATIVE
NRBC # BLD: 0 K/UL (ref 0–0.01)
NRBC BLD-RTO: 0 PER 100 WBC
PLATELET # BLD AUTO: 195 K/UL (ref 135–420)
PMV BLD AUTO: 11.2 FL (ref 9.2–11.8)
RBC # BLD AUTO: 3.78 M/UL (ref 4.2–5.3)
SPECIMEN EXP DATE BLD: NORMAL
SPECIMEN SOURCE: NORMAL
T4 FREE SERPL-MCNC: 1.1 NG/DL (ref 0.7–1.5)
TSH SERPL DL<=0.05 MIU/L-ACNC: 0.45 UIU/ML (ref 0.36–3.74)
WBC # BLD AUTO: 9 K/UL (ref 4.6–13.2)

## 2022-02-18 PROCEDURE — 87491 CHLMYD TRACH DNA AMP PROBE: CPT

## 2022-02-18 PROCEDURE — 75410000003 HC RECOV DEL/VAG/CSECN EA 0.5 HR

## 2022-02-18 PROCEDURE — 74011250636 HC RX REV CODE- 250/636: Performed by: ADVANCED PRACTICE MIDWIFE

## 2022-02-18 PROCEDURE — 74011250636 HC RX REV CODE- 250/636: Performed by: ANESTHESIOLOGY

## 2022-02-18 PROCEDURE — 76060000034 HC ANESTHESIA 1.5 TO 2 HR: Performed by: OBSTETRICS & GYNECOLOGY

## 2022-02-18 PROCEDURE — 76010000392 HC C SECN EA ADDL 0.5 HR: Performed by: OBSTETRICS & GYNECOLOGY

## 2022-02-18 PROCEDURE — 75410000002 HC LABOR FEE PER 1 HR

## 2022-02-18 PROCEDURE — 77030014144 HC TY SPN ANES BBMI -B: Performed by: ANESTHESIOLOGY

## 2022-02-18 PROCEDURE — 99282 EMERGENCY DEPT VISIT SF MDM: CPT

## 2022-02-18 PROCEDURE — 74011250636 HC RX REV CODE- 250/636: Performed by: NURSE ANESTHETIST, CERTIFIED REGISTERED

## 2022-02-18 PROCEDURE — 77030011265 HC ELECTRD BLD HEX COVD -A: Performed by: OBSTETRICS & GYNECOLOGY

## 2022-02-18 PROCEDURE — 2709999900 HC NON-CHARGEABLE SUPPLY: Performed by: OBSTETRICS & GYNECOLOGY

## 2022-02-18 PROCEDURE — 75410000003 HC RECOV DEL/VAG/CSECN EA 0.5 HR: Performed by: OBSTETRICS & GYNECOLOGY

## 2022-02-18 PROCEDURE — 87081 CULTURE SCREEN ONLY: CPT

## 2022-02-18 PROCEDURE — 84439 ASSAY OF FREE THYROXINE: CPT

## 2022-02-18 PROCEDURE — 76010000391 HC C SECN FIRST 1 HR: Performed by: OBSTETRICS & GYNECOLOGY

## 2022-02-18 PROCEDURE — 88307 TISSUE EXAM BY PATHOLOGIST: CPT

## 2022-02-18 PROCEDURE — 77030031139 HC SUT VCRL2 J&J -A: Performed by: OBSTETRICS & GYNECOLOGY

## 2022-02-18 PROCEDURE — 77030018831 HC SOL IRR H20 BAXT -A: Performed by: OBSTETRICS & GYNECOLOGY

## 2022-02-18 PROCEDURE — 85027 COMPLETE CBC AUTOMATED: CPT

## 2022-02-18 PROCEDURE — 77030008459 HC STPLR SKN COOP -B: Performed by: OBSTETRICS & GYNECOLOGY

## 2022-02-18 PROCEDURE — 84443 ASSAY THYROID STIM HORMONE: CPT

## 2022-02-18 PROCEDURE — 36415 COLL VENOUS BLD VENIPUNCTURE: CPT

## 2022-02-18 PROCEDURE — 0DNW0ZZ RELEASE PERITONEUM, OPEN APPROACH: ICD-10-PCS | Performed by: OBSTETRICS & GYNECOLOGY

## 2022-02-18 PROCEDURE — 65270000029 HC RM PRIVATE

## 2022-02-18 PROCEDURE — 74011250637 HC RX REV CODE- 250/637: Performed by: ADVANCED PRACTICE MIDWIFE

## 2022-02-18 PROCEDURE — 74011000250 HC RX REV CODE- 250: Performed by: ANESTHESIOLOGY

## 2022-02-18 PROCEDURE — 77030040361 HC SLV COMPR DVT MDII -B: Performed by: OBSTETRICS & GYNECOLOGY

## 2022-02-18 PROCEDURE — 4A1HXCZ MONITORING OF PRODUCTS OF CONCEPTION, CARDIAC RATE, EXTERNAL APPROACH: ICD-10-PCS | Performed by: OBSTETRICS & GYNECOLOGY

## 2022-02-18 PROCEDURE — 86900 BLOOD TYPING SEROLOGIC ABO: CPT

## 2022-02-18 RX ORDER — SODIUM CHLORIDE 0.9 % (FLUSH) 0.9 %
5-40 SYRINGE (ML) INJECTION EVERY 8 HOURS
Status: DISCONTINUED | OUTPATIENT
Start: 2022-02-18 | End: 2022-02-21 | Stop reason: HOSPADM

## 2022-02-18 RX ORDER — SODIUM CHLORIDE, SODIUM LACTATE, POTASSIUM CHLORIDE, CALCIUM CHLORIDE 600; 310; 30; 20 MG/100ML; MG/100ML; MG/100ML; MG/100ML
125 INJECTION, SOLUTION INTRAVENOUS CONTINUOUS
Status: DISPENSED | OUTPATIENT
Start: 2022-02-18 | End: 2022-02-19

## 2022-02-18 RX ORDER — KETOROLAC TROMETHAMINE 30 MG/ML
30 INJECTION, SOLUTION INTRAMUSCULAR; INTRAVENOUS
Status: ACTIVE | OUTPATIENT
Start: 2022-02-18 | End: 2022-02-20

## 2022-02-18 RX ORDER — IBUPROFEN 400 MG/1
800 TABLET ORAL
Status: DISCONTINUED | OUTPATIENT
Start: 2022-02-21 | End: 2022-02-21 | Stop reason: HOSPADM

## 2022-02-18 RX ORDER — CEFAZOLIN SODIUM/WATER 2 G/20 ML
2 SYRINGE (ML) INTRAVENOUS ONCE
Status: COMPLETED | OUTPATIENT
Start: 2022-02-18 | End: 2022-02-18

## 2022-02-18 RX ORDER — SIMETHICONE 80 MG
80 TABLET,CHEWABLE ORAL
Status: DISCONTINUED | OUTPATIENT
Start: 2022-02-18 | End: 2022-02-21 | Stop reason: HOSPADM

## 2022-02-18 RX ORDER — OXYTOCIN/0.9 % SODIUM CHLORIDE 30/500 ML
PLASTIC BAG, INJECTION (ML) INTRAVENOUS
Status: DISPENSED
Start: 2022-02-18 | End: 2022-02-18

## 2022-02-18 RX ORDER — SODIUM CHLORIDE, SODIUM LACTATE, POTASSIUM CHLORIDE, CALCIUM CHLORIDE 600; 310; 30; 20 MG/100ML; MG/100ML; MG/100ML; MG/100ML
125 INJECTION, SOLUTION INTRAVENOUS CONTINUOUS
Status: CANCELLED | OUTPATIENT
Start: 2022-02-18

## 2022-02-18 RX ORDER — OXYCODONE AND ACETAMINOPHEN 5; 325 MG/1; MG/1
1-2 TABLET ORAL
Status: DISCONTINUED | OUTPATIENT
Start: 2022-02-18 | End: 2022-02-21 | Stop reason: HOSPADM

## 2022-02-18 RX ORDER — ONDANSETRON 2 MG/ML
INJECTION INTRAMUSCULAR; INTRAVENOUS AS NEEDED
Status: DISCONTINUED | OUTPATIENT
Start: 2022-02-18 | End: 2022-02-18 | Stop reason: HOSPADM

## 2022-02-18 RX ORDER — PROMETHAZINE HYDROCHLORIDE 25 MG/ML
25 INJECTION, SOLUTION INTRAMUSCULAR; INTRAVENOUS
Status: DISCONTINUED | OUTPATIENT
Start: 2022-02-18 | End: 2022-02-21 | Stop reason: HOSPADM

## 2022-02-18 RX ORDER — OXYTOCIN/0.9 % SODIUM CHLORIDE 30/500 ML
87.3 PLASTIC BAG, INJECTION (ML) INTRAVENOUS AS NEEDED
Status: DISCONTINUED | OUTPATIENT
Start: 2022-02-18 | End: 2022-02-21 | Stop reason: HOSPADM

## 2022-02-18 RX ORDER — MORPHINE SULFATE 1 MG/ML
INJECTION, SOLUTION EPIDURAL; INTRATHECAL; INTRAVENOUS
Status: SHIPPED | OUTPATIENT
Start: 2022-02-18 | End: 2022-02-18

## 2022-02-18 RX ORDER — FENTANYL CITRATE 50 UG/ML
INJECTION, SOLUTION INTRAMUSCULAR; INTRAVENOUS
Status: SHIPPED | OUTPATIENT
Start: 2022-02-18 | End: 2022-02-18

## 2022-02-18 RX ORDER — KETOROLAC TROMETHAMINE 30 MG/ML
30 INJECTION, SOLUTION INTRAMUSCULAR; INTRAVENOUS EVERY 6 HOURS
Status: COMPLETED | OUTPATIENT
Start: 2022-02-18 | End: 2022-02-20

## 2022-02-18 RX ORDER — SODIUM CHLORIDE 0.9 % (FLUSH) 0.9 %
5-40 SYRINGE (ML) INJECTION AS NEEDED
Status: DISCONTINUED | OUTPATIENT
Start: 2022-02-18 | End: 2022-02-21 | Stop reason: HOSPADM

## 2022-02-18 RX ORDER — LIDOCAINE HYDROCHLORIDE 10 MG/ML
INJECTION INFILTRATION; PERINEURAL AS NEEDED
Status: DISCONTINUED | OUTPATIENT
Start: 2022-02-18 | End: 2022-02-18 | Stop reason: HOSPADM

## 2022-02-18 RX ORDER — ACETAMINOPHEN 325 MG/1
650 TABLET ORAL
Status: DISCONTINUED | OUTPATIENT
Start: 2022-02-18 | End: 2022-02-21 | Stop reason: HOSPADM

## 2022-02-18 RX ORDER — OXYTOCIN/RINGER'S LACTATE 30/500 ML
10 PLASTIC BAG, INJECTION (ML) INTRAVENOUS AS NEEDED
Status: DISCONTINUED | OUTPATIENT
Start: 2022-02-18 | End: 2022-02-21 | Stop reason: HOSPADM

## 2022-02-18 RX ORDER — BUPIVACAINE HYDROCHLORIDE 7.5 MG/ML
INJECTION, SOLUTION INTRASPINAL
Status: SHIPPED | OUTPATIENT
Start: 2022-02-18 | End: 2022-02-18

## 2022-02-18 RX ORDER — NALOXONE HYDROCHLORIDE 0.4 MG/ML
0.2 INJECTION, SOLUTION INTRAMUSCULAR; INTRAVENOUS; SUBCUTANEOUS
Status: ACTIVE | OUTPATIENT
Start: 2022-02-18 | End: 2022-02-19

## 2022-02-18 RX ORDER — OXYTOCIN/RINGER'S LACTATE 30/500 ML
PLASTIC BAG, INJECTION (ML) INTRAVENOUS
Status: DISCONTINUED | OUTPATIENT
Start: 2022-02-18 | End: 2022-02-18 | Stop reason: HOSPADM

## 2022-02-18 RX ORDER — SODIUM CHLORIDE 0.9 % (FLUSH) 0.9 %
5-40 SYRINGE (ML) INJECTION AS NEEDED
Status: CANCELLED | OUTPATIENT
Start: 2022-02-18

## 2022-02-18 RX ORDER — SODIUM CHLORIDE, SODIUM LACTATE, POTASSIUM CHLORIDE, CALCIUM CHLORIDE 600; 310; 30; 20 MG/100ML; MG/100ML; MG/100ML; MG/100ML
INJECTION, SOLUTION INTRAVENOUS
Status: DISCONTINUED | OUTPATIENT
Start: 2022-02-18 | End: 2022-02-18 | Stop reason: HOSPADM

## 2022-02-18 RX ORDER — LANOLIN ALCOHOL/MO/W.PET/CERES
3 CREAM (GRAM) TOPICAL
Status: DISCONTINUED | OUTPATIENT
Start: 2022-02-18 | End: 2022-02-21 | Stop reason: HOSPADM

## 2022-02-18 RX ORDER — BETAMETHASONE SODIUM PHOSPHATE AND BETAMETHASONE ACETATE 3; 3 MG/ML; MG/ML
12 INJECTION, SUSPENSION INTRA-ARTICULAR; INTRALESIONAL; INTRAMUSCULAR; SOFT TISSUE EVERY 24 HOURS
Status: DISPENSED | OUTPATIENT
Start: 2022-02-18 | End: 2022-02-20

## 2022-02-18 RX ORDER — SODIUM CHLORIDE 0.9 % (FLUSH) 0.9 %
5-40 SYRINGE (ML) INJECTION EVERY 8 HOURS
Status: CANCELLED | OUTPATIENT
Start: 2022-02-18

## 2022-02-18 RX ORDER — FACIAL-BODY WIPES
10 EACH TOPICAL
Status: DISCONTINUED | OUTPATIENT
Start: 2022-02-18 | End: 2022-02-21 | Stop reason: HOSPADM

## 2022-02-18 RX ORDER — DEXAMETHASONE SODIUM PHOSPHATE 4 MG/ML
INJECTION, SOLUTION INTRA-ARTICULAR; INTRALESIONAL; INTRAMUSCULAR; INTRAVENOUS; SOFT TISSUE AS NEEDED
Status: DISCONTINUED | OUTPATIENT
Start: 2022-02-18 | End: 2022-02-18 | Stop reason: HOSPADM

## 2022-02-18 RX ORDER — NALBUPHINE HYDROCHLORIDE 10 MG/ML
5 INJECTION, SOLUTION INTRAMUSCULAR; INTRAVENOUS; SUBCUTANEOUS
Status: ACTIVE | OUTPATIENT
Start: 2022-02-18 | End: 2022-02-19

## 2022-02-18 RX ADMIN — Medication 2 G: at 03:09

## 2022-02-18 RX ADMIN — SODIUM CHLORIDE, POTASSIUM CHLORIDE, SODIUM LACTATE AND CALCIUM CHLORIDE 1000 ML: 600; 310; 30; 20 INJECTION, SOLUTION INTRAVENOUS at 02:00

## 2022-02-18 RX ADMIN — BUPIVACAINE HYDROCHLORIDE IN DEXTROSE 9.75 MG: 7.5 INJECTION, SOLUTION SUBARACHNOID at 03:03

## 2022-02-18 RX ADMIN — KETOROLAC TROMETHAMINE 30 MG: 30 INJECTION, SOLUTION INTRAMUSCULAR at 13:18

## 2022-02-18 RX ADMIN — Medication 600 ML/HR: at 03:28

## 2022-02-18 RX ADMIN — ONDANSETRON HYDROCHLORIDE 8 MG: 2 INJECTION INTRAMUSCULAR; INTRAVENOUS at 03:36

## 2022-02-18 RX ADMIN — SODIUM CHLORIDE, SODIUM LACTATE, POTASSIUM CHLORIDE, AND CALCIUM CHLORIDE: 600; 310; 30; 20 INJECTION, SOLUTION INTRAVENOUS at 03:54

## 2022-02-18 RX ADMIN — FENTANYL CITRATE 20 MCG: 50 INJECTION, SOLUTION INTRAMUSCULAR; INTRAVENOUS at 03:03

## 2022-02-18 RX ADMIN — LIDOCAINE HYDROCHLORIDE 3 ML: 10 INJECTION, SOLUTION INFILTRATION; PERINEURAL at 02:56

## 2022-02-18 RX ADMIN — OXYCODONE AND ACETAMINOPHEN 2 TABLET: 5; 325 TABLET ORAL at 22:29

## 2022-02-18 RX ADMIN — KETOROLAC TROMETHAMINE 30 MG: 30 INJECTION, SOLUTION INTRAMUSCULAR at 19:01

## 2022-02-18 RX ADMIN — SODIUM CHLORIDE, SODIUM LACTATE, POTASSIUM CHLORIDE, AND CALCIUM CHLORIDE: 600; 310; 30; 20 INJECTION, SOLUTION INTRAVENOUS at 02:33

## 2022-02-18 RX ADMIN — BETAMETHASONE SODIUM PHOSPHATE AND BETAMETHASONE ACETATE 12 MG: 3; 3 INJECTION, SUSPENSION INTRA-ARTICULAR; INTRALESIONAL; INTRAMUSCULAR at 02:09

## 2022-02-18 RX ADMIN — KETOROLAC TROMETHAMINE 30 MG: 30 INJECTION, SOLUTION INTRAMUSCULAR at 07:06

## 2022-02-18 RX ADMIN — AZITHROMYCIN MONOHYDRATE 500 MG: 500 INJECTION, POWDER, LYOPHILIZED, FOR SOLUTION INTRAVENOUS at 03:12

## 2022-02-18 RX ADMIN — MORPHINE SULFATE 1.5 MG: 1 INJECTION, SOLUTION EPIDURAL; INTRATHECAL; INTRAVENOUS at 03:03

## 2022-02-18 RX ADMIN — DEXAMETHASONE SODIUM PHOSPHATE 8 MG: 4 INJECTION, SOLUTION INTRAMUSCULAR; INTRAVENOUS at 03:34

## 2022-02-18 NOTE — H&P
History & Physical    Name: Loni Airas MRN: [id number]  SSN: [id number]    YOB: 1990  Age: 36 y.o. Sex: female        Subjective:     Estimated Date of Delivery: 22  OB History    Para Term  AB Living   2 1       1   SAB IAB Ectopic Molar Multiple Live Births             1      # Outcome Date GA Lbr Jacob/2nd Weight Sex Delivery Anes PTL Lv   2 Current            1 Para 16     CS-Unspec   LOGAN       Ms. Ana Euceda is admitted with pregnancy at 33w1d for PPROM. Prenatal course was complicated by Di/Di twin IUP. Please see prenatal records for details. Past Medical History:   Diagnosis Date    Anxiety     Carpal tunnel syndrome     HLD (hyperlipidemia)     Obesity     PCOS (polycystic ovarian syndrome)     Psychiatric problem      Past Surgical History:   Procedure Laterality Date    HX  SECTION      HX  SECTION      HX TONSIL AND ADENOIDECTOMY      HX TONSILLECTOMY      ME GASTRIC BYPASS,OBESE<150CM DIONY-EN-Y       Social History     Occupational History    Not on file   Tobacco Use    Smoking status: Never Smoker    Smokeless tobacco: Never Used   Substance and Sexual Activity    Alcohol use: Not Currently     Comment: socially    Drug use: Not Currently     Types: Marijuana    Sexual activity: Yes     Partners: Male     Family History   Problem Relation Age of Onset    Diabetes Mother     Cancer Father        No Known Allergies  Prior to Admission medications    Medication Sig Start Date End Date Taking? Authorizing Provider   prenatal vit-calcium-iron-fa (Prenatal Plus, calcium carb,) 27 mg iron- 1 mg tab Take 1 Tablet by mouth daily. Provider, Historical   folic acid 942 mcg tablet Take 400 mcg by mouth daily. Provider, Historical   multivitamin (ONE A DAY) tablet Take 1 Tab by mouth daily. Patient not taking: Reported on 2022    Provider, Historical   calcium citrate 200 mg (950 mg) tablet Take  by mouth daily.   Patient not taking: Reported on 2022    Provider, Historical   cyanocobalamin (VITAMIN B12) 500 mcg tablet Take 500 mcg by mouth daily. Patient not taking: Reported on 2022    Provider, Historical   ferrous sulfate (Iron) 325 mg (65 mg iron) tablet Take  by mouth Daily (before breakfast). Patient not taking: Reported on 2022    Provider, Historical   ondansetron (ZOFRAN ODT) 4 mg disintegrating tablet Take 1 Tab by mouth every eight (8) hours as needed for Nausea or Vomiting. Indications: prevent nausea and vomiting after surgery  Patient not taking: Reported on 20   Ok Nava, TIFF   escitalopram oxalate (Lexapro) 10 mg tablet Take 20 mg by mouth daily. Provider, Historical        Review of Systems: A comprehensive review of systems was negative except for that written in the HPI. Objective:     Vitals:  Vitals:    22 0109   Weight: 91.6 kg (202 lb)   Height: 5' 2\" (1.575 m)        Physical Exam:  Patient without distress. Perineum: blood absent, amniotic fluid present  Cervical Exam: 3 cm dilated    80% effaced    -1 station    Presenting Part: breech  Cervical Position: mid position  Membranes:  Spontaneous Rupture of Membranes;  Amniotic Fluid: clear fluid  Fetal Heart Rate: Baseline: Baby A: 150 per minute  Variability: moderate  Accelerations: yes  Decelerations: variable  Fetal Heart Rate: Baseline: Baby B: 140 per minute  Variability: moderate  Accelerations: yes  Decelerations: variable  Uterine contractions: regular, every 2-3 minutes    Prenatal Labs:   No results found for: ABORH, RUBELLAEXT, GRBSEXT, HBSAGEXT, HIVEXT, RPREXT, GONNOEXT, CHLAMEXT, ABORHEXT, RUBELLAEXT, GRBSEXT, HBSAGEXT, HIVEXT, RPREXT, GONNOEXT, CHLAMEXT      Assessment/Plan:     Active Problems:    Twin pregnancy, twins dichorionic and diamniotic (2022)      33 weeks gestation of pregnancy (2022)       premature rupture of membranes (PPROM) with onset of labor after 24 hours of rupture in first trimester, antepartum (2022)         Plan: Admit for PPROM at 33 weeks 1 day with Di/Di twins and prior cesarear section. Plan to proceed with  Section. Discussed plan of care with Dr. Dalton Ye and she is in agreement with POC to proceed with RLTCS. Risks of bleeding, infection, bladder and bowel damage explained to patient,  and father of baby. They understand the situation and consent to the  delivery.   Group B Strep was tested on admission

## 2022-02-18 NOTE — ANESTHESIA PREPROCEDURE EVALUATION
Relevant Problems   ENDOCRINE   (+) Obesity, morbid (HCC)       Anesthetic History   No history of anesthetic complications            Review of Systems / Medical History  Patient summary reviewed, nursing notes reviewed and pertinent labs reviewed    Pulmonary  Within defined limits                 Neuro/Psych   Within defined limits           Cardiovascular  Within defined limits                Exercise tolerance: >4 METS     GI/Hepatic/Renal  Within defined limits              Endo/Other        Morbid obesity     Other Findings              Physical Exam    Airway  Mallampati: III  TM Distance: 4 - 6 cm  Neck ROM: decreased range of motion   Mouth opening: Diminished (comment)     Cardiovascular  Regular rate and rhythm,  S1 and S2 normal,  no murmur, click, rub, or gallop  Rhythm: regular  Rate: normal         Dental  No notable dental hx       Pulmonary  Breath sounds clear to auscultation               Abdominal  GI exam deferred       Other Findings            Anesthetic Plan    ASA: 3, emergent  Anesthesia type: spinal            Anesthetic plan and risks discussed with: Patient      Risks of bleeding, infection, nerve injury, failed block, headache requiring epidural blood patch, back pain, and failed block discussed. Procedure described as elective. Pt understands and desires to proceed.

## 2022-02-18 NOTE — LACTATION NOTE
This note was copied from a baby's chart. Set mom up with double electric breast pump and educated on how to use initiation mode, pump hygiene, and safe milk storage due to infants in NICU. Mom to pump q 3 hours for 15 minutes on initiation mode. Mom verbalized understanding and no questions at this time.

## 2022-02-18 NOTE — PROGRESS NOTES
9904 Verbal shift change report given to Jessica Ramírez RN (oncoming nurse) by Margo Foreman RN (offgoing nurse). Report included the following information SBAR, Kardex, Procedure Summary, Intake/Output, MAR and Recent Results. 5752: Shift assessment completed. POC reviewed w/ pt. Pt has no needs at this time and denies pain. Per pt, pt received Tdap and Flu vaccine about a month ago. 1145: 500 mL of yellow urine drained from kaminski. Pt taken to NICU via wheelchair w/ IV equipment. FOB accompanying pt. 1245: Mother returned to room 243 via wheelchair from NICU.    1325: Kaminski catheter removed w/o difficulty. Pt ambulated to bathroom at this time. Una care and self care performed. Linen changed. Pt tolerated well.     1330: VSS. Pt pumping at this time. 1615: Reassessment completed. 1723: Pt voided 350 ml of clear, red urine. 1735: Pt and FOB ambulated to NICU at this time. 1903: Pt and FOB ambulated back to room 243. Toradol administered. Pt up in bathroom     1910 Verbal shift change report given to Michael Ramsey RN (oncoming nurse) by Jessica Ramírez RN (offgoing nurse).  Report included the following information SBAR, Kardex, Procedure Summary, Intake/Output, MAR and Recent Results.  Plan: Will discuss starting a biologic (Stelara or Skyrizi) at next visit if there is no improvement. Patient does not like the GI side effects of Otezla. Discontinue Regimen: Bryhali lotion Render In Strict Bullet Format?: No Detail Level: Zone Continue Regimen: Otezla: Take 30mg PO BID. Initiate Treatment: Enstilar 0.005%-0.064% topical foam: Apply once a day to affected areas of psoriasis x 2-3 weeks until clear. Then use PRN flares.

## 2022-02-18 NOTE — PROGRESS NOTES
8618 Verbal report given to River SMITH (oncoming nurse) by Christen Ramon RN (offgoing nurse). Report included the following information SBAR, Kardex, Procedure Summary, Intake/Output, MAR and Recent Results. 7878 Awaiting for patient to be transferred to unit. 7743 TRANSFER - IN REPORT:    Christen Ramon RN and tech (name) transferred patient Gonzalo Jose  being received from L&D (unit) for routine progression of care      Report consisted of patients Situation, Background, Assessment and   Recommendations(SBAR). Information from the following report(s) SBAR, Kardex, Procedure Summary, Intake/Output, MAR and Recent Results was reviewed with the receiving nurse. Opportunity for questions and clarification was provided. Assessment completed upon patients arrival to unit and care assumed. 2881 Verbal shift change report given to [facility] (oncoming nurse) by River RN (offgoing nurse). Report included the following information SBAR, Kardex, Procedure Summary, Intake/Output, MAR and Recent Results.

## 2022-02-18 NOTE — PROGRESS NOTES
4111 - TRANSFER - OUT REPORT:    Verbal report given to SHIRA Sam RN (name) on Toi Mendes  being transferred to mother/baby (unit) for routine progression of care       Report consisted of patients Situation, Background, Assessment and   Recommendations(SBAR). Information from the following report(s) SBAR, OR Summary, Intake/Output, MAR and Recent Results was reviewed with the receiving nurse. Lines:   Peripheral IV 02/18/22 Posterior;Right Hand (Active)   Site Assessment Clean, dry, & intact 02/18/22 0430   Phlebitis Assessment 0 02/18/22 0430   Infiltration Assessment 0 02/18/22 0430   Dressing Status Clean, dry, & intact 02/18/22 0430   Dressing Type Tape;Transparent 02/18/22 0430   Hub Color/Line Status Infusing 02/18/22 0430        Opportunity for questions and clarification was provided.       Patient transported with:   Registered Nurse

## 2022-02-18 NOTE — OP NOTES
Section Delivery Procedure Note for [facility]    Name: Laura Rich   Medical Record Number: [id number]   YOB: 1990  Today's Date: 2022      Preoperative Diagnosis: ptprom /twins/prev c/s/ abnormal presentation    Postoperative Diagnosis: same/pelvic adhesions    Procedure: Low Cervical Transverse Procedure(s):lysis of adhesions   SECTION    Surgeon(s):  Jose G Fowler MD    Assistants: as per or record    Assistants Tasks:   Retraction and Closing  [id number]      Anesthesia: Spinal    Prophylactic Antibiotics: Ancef pre-op zithromax  pre-delivery 1 dose each. Procedure Details:    Patient was induced under spinal anesthetic, placed supine, pillow under right hip, scrubbed and draped. She was checked for adequate anesthesia. Pfannenstiel incision was made in the skin, extended through the subcutaneous tissue and the anterior rectus sheath. The rectus muscles were  and a longitudinal incision was made in the parietal peritoneum preserving the bladder. Bladder blade was positioned. Uterus was noted to be adherent above the lower ut segment to ant abdominal wall  Transverse incision is made in the lower segment of the uterus after first carefully locating the position of the lower segment. Casey Section was delivered by birgit breech extraction  through this incision. No traction is placed upon the fetal head during this delivery. cord is clamped and cut. Baby is handed off to waiting pediatric expert, after initial resuscitation with bulb suction while we waited 1 minute  for the cord to be clamped and cut. Sac of baby b was ruptured clear fluid noted and baby b delivered by breech extraction. The cord was clamped and cut after 1 minute and baby handed to awaiting  staff  Placenta is now delivered manually, and the uterus was delivered out of the pelvic cavity.  In order to do this the ant abd wasll was seaprated from upper myometrium by electrocautery  Uterine cavity is carefully cleaned with a dry pack and inspected to be sure that it is empty. Myometrium is closed in two layers the first running locking and the second running imbricating. A further suture is placed in the serosa hold the incision together and achieve hemostasis and close the serosa. The area of adhesiolysis on the myometrium was closed with 0 vicryl running suture and multiple figure of eight sutures were needed to be palced to achieve hemostasis. Once we were sure that hemostasis had been achieved, the uterus was returned to the peritoneal cavity. Careful hemostasis is achieved in the rectus sheath, and this is closed with a running suture. Careful hemostasis is obtained with cautery in the subcutaneous tissue and the skin is closed with 4-0 monocryl in a subcuticular fashion. Sterile dressing is applied, patient is recovered and sent to recovery room in good condition. .          Estimated Blood Loss: qbl 680cc  Replacement: 2500cclr    Fetal Description: multiple gestation 2 male/ fr breech/breech     Apgar - One Minute: 8, 6    Apgar - Five Minutes: 8,8    Umbilical Cord: 3 vessels present             Cord Blood Results:   Information for the patient's :  Diamond Gutiérrez [[id number]]   No results found for: Bertrand Naas, PCTDIG, BILI, ABORH, ABORHEXT   Information for the patient's :  Diamond Gutiérrez [[id number]]   No results found for: Bertrand Naas, PCTDIG, BILI, [address], ABORHEXT          Birth Information:   Information for the patient's :  Diamond Gutiérrez [[id number]]      Information for the patient's :  Diamond Gutiérrez [[id number]]          Specimens:placenta        Maternal Findings    Uterus Size: enlarged, Fibroids: no , Adhesions: {Severe, Anomalies: None Defects: no   Tubes normal   Ovaries normal   Abdomen Adhesions: Severe              Uterus attached for 3-5 cm midline to ant abd wall midway ant to fundus. Complications:  none             Mother's Condition: good  Baby's Condition: good to nicu    Signed: Jeffy Rosario MD      February 18, 2022

## 2022-02-18 NOTE — PROGRESS NOTES
Post-Operative  Day 0    Melba Soria     Assessment:   Hospital Problems  Date Reviewed: 2022          Codes Class Noted POA    Twin pregnancy, twins dichorionic and diamniotic ICD-10-CM: O30.46  ICD-9-CM: 651.00, V91.03  2022 Yes        33 weeks gestation of pregnancy ICD-10-CM: Z3A.33  ICD-9-CM: V22.2  2022 Yes        * (Principal)  premature rupture of membranes (PPROM) with onset of labor after 24 hours of rupture in first trimester, antepartum ICD-10-CM: O42.111  ICD-9-CM: 658.23  2022 Yes        S/P emergency  section ICD-10-CM: P53.244  ICD-9-CM: V45.89  2022 No            Post-Op day 0, stable    Plan:   1. Routine post-operative care   2. Patient declines circumcision for infants. Information for the patient's :  Kevin Ruth [[id number]]   , Low Transverse   Information for the patient's :  Kevin Ruth [[id number]]   , Low Vertical    Patient doing well without significant complaint. Nausea and vomiting resolved, tolerating diet, no flatus, kaminski in place. Baby boys are both in NICU, baby B is intubated.       Current Facility-Administered Medications   Medication Dose Route Frequency    sodium chloride (NS) flush 5-40 mL  5-40 mL IntraVENous Q8H    betamethasone (CELESTONE) injection 12 mg  12 mg IntraMUSCular Q24H    sodium chloride (NS) flush 5-40 mL  5-40 mL IntraVENous Q8H    lactated Ringers infusion  125 mL/hr IntraVENous CONTINUOUS    ketorolac (TORADOL) injection 30 mg  30 mg IntraVENous Q6H    oxytocin in normal saline (PITOCIN) 30 unit/500 mL infusion        sodium chloride (NS) flush 5-40 mL  5-40 mL IntraVENous Q8H        Vitals:  Visit Vitals  /61 (BP 1 Location: Left arm, BP Patient Position: At rest)   Pulse 66   Temp 98.3 °F (36.8 °C)   Resp 15   Ht 5' 2\" (1.575 m)   Wt 91.6 kg (202 lb)   SpO2 98%   BMI 36.95 kg/m²     Temp (24hrs), Av.5 °F (36.9 °C), Min:98.3 °F (36.8 °C), Max:98.6 °F (37 °C)      Last 24hr Input/Output:    Intake/Output Summary (Last 24 hours) at 2/18/2022 0902  Last data filed at 2/18/2022 0433  Gross per 24 hour   Intake 1550 ml   Output 780 ml   Net 770 ml          Exam:        Patient without distress. Lungs clear. Abdomen, bowel sounds present, soft, expected tenderness, fundus firm Wound incision intact     Perineum normal lochia noted               Lower extremities are negative for swelling, cords or tenderness.     Labs:   Lab Results   Component Value Date/Time    WBC 9.0 02/18/2022 02:13 AM    WBC 5.5 02/10/2021 01:40 PM    WBC 11.1 11/24/2020 09:50 AM    HGB 11.1 (L) 02/18/2022 02:13 AM    HGB 14.4 02/10/2021 01:40 PM    HGB 13.0 11/24/2020 09:50 AM    HCT 33.6 (L) 02/18/2022 02:13 AM    HCT 45.1 (H) 02/10/2021 01:40 PM    HCT 37.5 11/24/2020 09:50 AM    PLATELET 557 94/60/1232 02:13 AM    PLATELET 031 25/45/5247 01:40 PM    PLATELET 169 43/46/3907 09:50 AM       Recent Results (from the past 24 hour(s))   CBC W/O DIFF    Collection Time: 02/18/22  2:13 AM   Result Value Ref Range    WBC 9.0 4.6 - 13.2 K/uL    RBC 3.78 (L) 4.20 - 5.30 M/uL    HGB 11.1 (L) 12.0 - 16.0 g/dL    HCT 33.6 (L) 35.0 - 45.0 %    MCV 88.9 78.0 - 100.0 FL    MCH 29.4 24.0 - 34.0 PG    MCHC 33.0 31.0 - 37.0 g/dL    RDW 12.1 11.6 - 14.5 %    PLATELET 956 672 - 075 K/uL    MPV 11.2 9.2 - 11.8 FL    NRBC 0.0 0  WBC    ABSOLUTE NRBC 0.00 0.00 - 0.01 K/uL   TYPE & SCREEN    Collection Time: 02/18/22  2:13 AM   Result Value Ref Range    Crossmatch Expiration 02/21/2022,2359     ABO/Rh(D) A POSITIVE     Antibody screen NEG    TSH 3RD GENERATION    Collection Time: 02/18/22  8:00 AM   Result Value Ref Range    TSH 0.45 0.36 - 3.74 uIU/mL

## 2022-02-18 NOTE — H&P
Date of Surgery Update:  Loni Arias was seen and examined. History and physical has been reviewed. The patient has been examined.  There have been no significant clinical changes since the completion of the originally dated History and Physical.will give betamethasone and zithromax/ancef will proceed with repeat c/s for ptprom twins prev c/s br/br presentation in early labor    Signed By: Roxane Stout MD     February 18, 2022 2:29 AM

## 2022-02-18 NOTE — ANESTHESIA PROCEDURE NOTES
Spinal Block    Start time: 2/18/2022 2:55 AM  End time: 2/18/2022 3:03 AM  Performed by: Molly Stoddard CRNA  Authorized by: Prospre Brice MD     Pre-procedure: Indications: primary anesthetic  Preanesthetic Checklist: patient identified, risks and benefits discussed, anesthesia consent, site marked, patient being monitored and timeout performed    Timeout Time: 02:55 EST          Spinal Block:   Patient Position:  Seated  Prep Region:  Lumbar  Prep: chlorhexidine and patient draped      Location:  L3-4  Technique:  Single shot        Needle:   Needle Type:   Aj  Needle Gauge:  25 G  Attempts:  2      Events: CSF confirmed, no blood with aspiration and no paresthesia        Assessment:  Insertion:  Uncomplicated  Patient tolerance:  Patient tolerated the procedure well with no immediate complications

## 2022-02-18 NOTE — PROGRESS NOTES
Problem: Falls - Risk of  Goal: *Absence of Falls  Description: Document Darlen Burlington Fall Risk and appropriate interventions in the flowsheet.   Outcome: Progressing Towards Goal  Note: Fall Risk Interventions:  Mobility Interventions: Patient to call before getting OOB              Elimination Interventions: Call light in reach              Problem: Patient Education: Go to Patient Education Activity  Goal: Patient/Family Education  Outcome: Progressing Towards Goal     Problem: Patient Education: Go to Patient Education Activity  Goal: Patient/Family Education  Outcome: Progressing Towards Goal     Problem:  Delivery: Day of Delivery  Goal: Off Pathway (Use only if patient is Off Pathway)  Outcome: Progressing Towards Goal  Goal: Activity/Safety  Outcome: Progressing Towards Goal  Goal: Consults, if ordered  Outcome: Progressing Towards Goal  Goal: Diagnostic Test/Procedures  Outcome: Progressing Towards Goal  Goal: Nutrition/Diet  Outcome: Progressing Towards Goal  Goal: Discharge Planning  Outcome: Progressing Towards Goal  Goal: Medications  Outcome: Progressing Towards Goal  Goal: Respiratory  Outcome: Progressing Towards Goal  Goal: Treatments/Interventions/Procedures  Outcome: Progressing Towards Goal  Goal: Psychosocial  Outcome: Progressing Towards Goal  Goal: *Vital signs within defined limits  Outcome: Progressing Towards Goal  Goal: *Labs within defined limits  Outcome: Progressing Towards Goal  Goal: *Hemodynamically stable  Outcome: Progressing Towards Goal  Goal: *Optimal pain control at patient's stated goal  Outcome: Progressing Towards Goal  Goal: *Participates in infant care  Outcome: Progressing Towards Goal  Goal: *Demonstrates progressive activity  Outcome: Progressing Towards Goal  Goal: *Tolerating diet  Outcome: Progressing Towards Goal     Problem:  Delivery: Postpartum Day 1  Goal: Off Pathway (Use only if patient is Off Pathway)  Outcome: Progressing Towards Goal  Goal: Activity/Safety  Outcome: Progressing Towards Goal  Goal: Consults, if ordered  Outcome: Progressing Towards Goal  Goal: Diagnostic Test/Procedures  Outcome: Progressing Towards Goal  Goal: Nutrition/Diet  Outcome: Progressing Towards Goal  Goal: Discharge Planning  Outcome: Progressing Towards Goal  Goal: Medications  Outcome: Progressing Towards Goal  Goal: Respiratory  Outcome: Progressing Towards Goal  Goal: Treatments/Interventions/Procedures  Outcome: Progressing Towards Goal  Goal: Psychosocial  Outcome: Progressing Towards Goal  Goal: *Vital signs within defined limits  Outcome: Progressing Towards Goal  Goal: *Labs within defined limits  Outcome: Progressing Towards Goal  Goal: *Hemodynamically stable  Outcome: Progressing Towards Goal  Goal: *Optimal pain control at patient's stated goal  Outcome: Progressing Towards Goal  Goal: *Participates in infant care  Outcome: Progressing Towards Goal  Goal: *Demonstrates progressive activity  Outcome: Progressing Towards Goal  Goal: *Tolerating diet  Outcome: Progressing Towards Goal  Goal: *Performs self perineal care  Outcome: Progressing Towards Goal     Problem:  Delivery: Postpartum Day 2  Goal: Off Pathway (Use only if patient is Off Pathway)  Outcome: Progressing Towards Goal  Goal: Activity/Safety  Outcome: Progressing Towards Goal  Goal: Consults, if ordered  Outcome: Progressing Towards Goal  Goal: Nutrition/Diet  Outcome: Progressing Towards Goal  Goal: Discharge Planning  Outcome: Progressing Towards Goal  Goal: Medications  Outcome: Progressing Towards Goal  Goal: Treatments/Interventions/Procedures  Outcome: Progressing Towards Goal  Goal: Psychosocial  Outcome: Progressing Towards Goal  Goal: *Vital signs within defined limits  Outcome: Progressing Towards Goal  Goal: *Labs within defined limits  Outcome: Progressing Towards Goal  Goal: *Hemodynamically stable  Outcome: Progressing Towards Goal  Goal: *Optimal pain control at patient's stated goal  Outcome: Progressing Towards Goal  Goal: *Participates in infant care  Outcome: Progressing Towards Goal  Goal: *Demonstrates progressive activity  Outcome: Progressing Towards Goal  Goal: *Appropriate parent-infant bonding  Outcome: Progressing Towards Goal  Goal: *Tolerating diet  Outcome: Progressing Towards Goal     Problem:  Delivery: Postpartum Day 3  Goal: Off Pathway (Use only if patient is Off Pathway)  Outcome: Progressing Towards Goal  Goal: Activity/Safety  Outcome: Progressing Towards Goal  Goal: Consults, if ordered  Outcome: Progressing Towards Goal  Goal: Nutrition/Diet  Outcome: Progressing Towards Goal  Goal: Discharge Planning  Outcome: Progressing Towards Goal  Goal: Medications  Outcome: Progressing Towards Goal  Goal: Treatments/Interventions/Procedures  Outcome: Progressing Towards Goal  Goal: Psychosocial  Outcome: Progressing Towards Goal     Problem:  Delivery: Discharge Outcomes  Goal: *Follow-up appointments as indicated  Outcome: Progressing Towards Goal  Goal: *Describes available resources and support systems  Outcome: Progressing Towards Goal  Goal: *No signs and symptoms of infection  Outcome: Progressing Towards Goal  Goal: *Birth certificate information completed  Outcome: Progressing Towards Goal  Goal: *Received and verbalizes understanding of discharge plan and instructions  Outcome: Progressing Towards Goal  Goal: *Vital signs within defined limits  Outcome: Progressing Towards Goal  Goal: *Labs within defined limits  Outcome: Progressing Towards Goal  Goal: *Hemodynamically stable  Outcome: Progressing Towards Goal  Goal: *Optimal pain control at patient's stated goal  Outcome: Progressing Towards Goal  Goal: *Participates in infant care  Outcome: Progressing Towards Goal  Goal: *Demonstrates progressive activity  Outcome: Progressing Towards Goal  Goal: *Appropriate parent-infant bonding  Outcome: Progressing Towards Goal  Goal: *Tolerating diet  Outcome: Progressing Towards Goal  Goal: *Verbalizes name, dosage, time, side effects, and number of days to continue medications  Outcome: Progressing Towards Goal  Goal: *Influenza vaccine administered (October-March)  Outcome: Progressing Towards Goal

## 2022-02-18 NOTE — PROGRESS NOTES
Problem: Patient Education: Go to Patient Education Activity  Goal: Patient/Family Education  Outcome: Progressing Towards Goal     Problem:  Delivery: Day of Delivery  Goal: Activity/Safety  Outcome: Progressing Towards Goal  Goal: *Optimal pain control at patient's stated goal  Outcome: Progressing Towards Goal

## 2022-02-19 LAB
HCT VFR BLD AUTO: 26.2 % (ref 35–45)
HGB BLD-MCNC: 8.6 G/DL (ref 12–16)

## 2022-02-19 PROCEDURE — 65270000029 HC RM PRIVATE

## 2022-02-19 PROCEDURE — 74011000250 HC RX REV CODE- 250: Performed by: ADVANCED PRACTICE MIDWIFE

## 2022-02-19 PROCEDURE — 85018 HEMOGLOBIN: CPT

## 2022-02-19 PROCEDURE — 74011250637 HC RX REV CODE- 250/637: Performed by: ADVANCED PRACTICE MIDWIFE

## 2022-02-19 PROCEDURE — 74011250636 HC RX REV CODE- 250/636: Performed by: ADVANCED PRACTICE MIDWIFE

## 2022-02-19 PROCEDURE — 36415 COLL VENOUS BLD VENIPUNCTURE: CPT

## 2022-02-19 RX ORDER — DOCUSATE SODIUM 100 MG/1
100 CAPSULE, LIQUID FILLED ORAL
Status: DISCONTINUED | OUTPATIENT
Start: 2022-02-19 | End: 2022-02-21 | Stop reason: HOSPADM

## 2022-02-19 RX ORDER — FLUCONAZOLE 100 MG/1
150 TABLET ORAL ONCE
Status: COMPLETED | OUTPATIENT
Start: 2022-02-19 | End: 2022-02-19

## 2022-02-19 RX ORDER — ESCITALOPRAM OXALATE 10 MG/1
10 TABLET ORAL DAILY
Status: DISCONTINUED | OUTPATIENT
Start: 2022-02-19 | End: 2022-02-21 | Stop reason: HOSPADM

## 2022-02-19 RX ORDER — ESCITALOPRAM OXALATE 10 MG/1
10 TABLET ORAL DAILY
Status: DISCONTINUED | OUTPATIENT
Start: 2022-02-20 | End: 2022-02-19

## 2022-02-19 RX ADMIN — FLUCONAZOLE 150 MG: 100 TABLET ORAL at 16:27

## 2022-02-19 RX ADMIN — KETOROLAC TROMETHAMINE 30 MG: 30 INJECTION, SOLUTION INTRAMUSCULAR at 20:53

## 2022-02-19 RX ADMIN — KETOROLAC TROMETHAMINE 30 MG: 30 INJECTION, SOLUTION INTRAMUSCULAR at 01:08

## 2022-02-19 RX ADMIN — SODIUM CHLORIDE, PRESERVATIVE FREE 10 ML: 5 INJECTION INTRAVENOUS at 20:53

## 2022-02-19 RX ADMIN — KETOROLAC TROMETHAMINE 30 MG: 30 INJECTION, SOLUTION INTRAMUSCULAR at 13:48

## 2022-02-19 RX ADMIN — DOCUSATE SODIUM 100 MG: 100 CAPSULE ORAL at 18:54

## 2022-02-19 RX ADMIN — KETOROLAC TROMETHAMINE 30 MG: 30 INJECTION, SOLUTION INTRAMUSCULAR at 07:35

## 2022-02-19 RX ADMIN — ESCITALOPRAM OXALATE 10 MG: 10 TABLET ORAL at 16:27

## 2022-02-19 RX ADMIN — SODIUM CHLORIDE, PRESERVATIVE FREE 10 ML: 5 INJECTION INTRAVENOUS at 01:08

## 2022-02-19 NOTE — PROGRESS NOTES
Progress Note                               Patient: Zhang Zazueta MRN: [id number]  SSN: [id number]    YOB: 1990  Age: 36 y.o. Sex: female      1 Day Post-Op     Subjective:     Patient doing well postpartum without significant complaints. Voiding without difficulty. Patient reports normal lochia. . Breastfeeding: Yes     Objective:     Patient Vitals for the past 18 hrs:   Temp Pulse Resp BP SpO2   22 0844 97.8 °F (36.6 °C) (!) 54 16 120/79 100 %   22 0342 97.9 °F (36.6 °C) (!) 51 16 108/73 98 %   22 2009 98.6 °F (37 °C) (!) 54 16 (!) 109/58 99 %       Temp (24hrs), Av.1 °F (36.7 °C), Min:97.8 °F (36.6 °C), Max:98.6 °F (37 °C)      Physical Exam:    General:   Patient without distress. Abdomen: Soft, fundus firm at level of umbilicus, nontender   Incision: Clean, dry, and intact without erythema   Lower Extremities: Negative for swelling, cords, or tenderness        Lab/Data Review: All lab results for the last 24 hours reviewed. Assessment and Plan:     Patient appears to be having an uncomplicated post- course. Continue routine postoperative care and maternal education. Day three discharge.       Aramis Quinonez CNM

## 2022-02-19 NOTE — LACTATION NOTE
1930 per mom, has been pumping q3. Discussed and encouraged increasing hydration and rest. Provided extra syringes and caps. No additional questions at this time. Will remain available.

## 2022-02-19 NOTE — PROGRESS NOTES
Assumed care of pt.  0840-eating breakfast.  0915-assessment completed. Encouraged IS use. Denies needs. 0935-ambulated to NICU. 1115-pumping. 1235- asleep. 1348-toradol given by FELICITA Carcamo RN. 1445-In NICU visiting infants. Denies needs. 1555-pumping- to call when completed. 1630-VSS. Reassessment completed. 1800-up to shower. 1810-assisted pt to remove dressing in shower. 1840-incision intact. 1905-Bedside and Verbal shift change report given to LETI Ruiz RN  (oncoming nurse) by LEMUEL Conteh LPN (offgoing nurse). Report given with SBAR, Kardex, Intake/Output, MAR and Recent Results.

## 2022-02-19 NOTE — PROGRESS NOTES
0715 - Bedside and Verbal shift change report given to LEMUEL Conteh LPN by [place] RN. Report included the following information SBAR, Kardex, OR Summary, Intake/Output and MAR.

## 2022-02-19 NOTE — ANESTHESIA POSTPROCEDURE EVALUATION
Post-Anesthesia Evaluation and Assessment    Cardiovascular Function/Vital Signs  Visit Vitals  BP (!) 104/57 (BP 1 Location: Right arm, BP Patient Position: At rest)   Pulse 66   Temp 36.8 °C (98.2 °F)   Resp 16   Ht 5' 2\" (1.575 m)   Wt 91.6 kg (202 lb)   SpO2 99%   BMI 36.95 kg/m²       Patient is status post Procedure(s):   SECTION. Nausea/Vomiting: Controlled. Postoperative hydration reviewed and adequate. Pain:  Pain Scale 1: Numeric (0 - 10) (22)  Pain Intensity 1: 2 (22)   Managed. Neurological Status: At baseline. Mental Status and Level of Consciousness: Arousable. Pulmonary Status:   O2 Device: None (Room air) (22 0950)   Adequate oxygenation and airway patent. Complications related to anesthesia: None    Post-anesthesia assessment completed. No concerns. Patient has met all discharge requirements. Signed By: Torrie Rodas MD    2022               Procedure(s):   SECTION.     spinal    <BSHSIANPOST>    INITIAL Post-op Vital signs:   Vitals Value Taken Time   /57 22 1605   Temp 36.8 °C (98.2 °F) 22 1605   Pulse 66 22 1605   Resp 16 22 1605   SpO2 99 % 22 1605

## 2022-02-19 NOTE — ADVANCED PRACTICE NURSE
Follow up with patient  status post repeat , twin delivery. Reports slight back soreness, but eating, drinking, voiding, ambulating without difficulties. Patient in no distress and without significant complaints.

## 2022-02-20 PROCEDURE — 65270000029 HC RM PRIVATE

## 2022-02-20 PROCEDURE — 74011250636 HC RX REV CODE- 250/636: Performed by: ADVANCED PRACTICE MIDWIFE

## 2022-02-20 PROCEDURE — 74011250637 HC RX REV CODE- 250/637: Performed by: ADVANCED PRACTICE MIDWIFE

## 2022-02-20 RX ORDER — IBUPROFEN 800 MG/1
800 TABLET ORAL
Qty: 90 TABLET | Refills: 0 | Status: SHIPPED | OUTPATIENT
Start: 2022-02-21

## 2022-02-20 RX ORDER — OXYCODONE AND ACETAMINOPHEN 5; 325 MG/1; MG/1
1 TABLET ORAL
Qty: 20 TABLET | Refills: 0 | Status: SHIPPED | OUTPATIENT
Start: 2022-02-20 | End: 2022-02-21 | Stop reason: SDUPTHER

## 2022-02-20 RX ADMIN — OXYCODONE AND ACETAMINOPHEN 2 TABLET: 5; 325 TABLET ORAL at 00:01

## 2022-02-20 RX ADMIN — OXYCODONE AND ACETAMINOPHEN 1 TABLET: 5; 325 TABLET ORAL at 22:25

## 2022-02-20 RX ADMIN — ESCITALOPRAM OXALATE 10 MG: 10 TABLET ORAL at 08:05

## 2022-02-20 RX ADMIN — KETOROLAC TROMETHAMINE 30 MG: 30 INJECTION, SOLUTION INTRAMUSCULAR at 03:05

## 2022-02-20 NOTE — PROGRESS NOTES
Problem:  Delivery: Postpartum Day 1  Goal: Activity/Safety  Outcome: Progressing Towards Goal  Goal: Nutrition/Diet  Outcome: Progressing Towards Goal  Goal: Medications  Outcome: Progressing Towards Goal  Goal: Respiratory  Outcome: Progressing Towards Goal  Goal: Psychosocial  Outcome: Progressing Towards Goal  Goal: *Vital signs within defined limits  Outcome: Progressing Towards Goal  Goal: *Labs within defined limits  Outcome: Progressing Towards Goal  Goal: *Hemodynamically stable  Outcome: Progressing Towards Goal  Goal: *Optimal pain control at patient's stated goal  Outcome: Progressing Towards Goal  Goal: *Participates in infant care  Outcome: Progressing Towards Goal  Goal: *Demonstrates progressive activity  Outcome: Progressing Towards Goal  Goal: *Tolerating diet  Outcome: Progressing Towards Goal  Goal: *Performs self perineal care  Outcome: Progressing Towards Goal     Problem: Pain  Goal: *Control of Pain  Outcome: Progressing Towards Goal

## 2022-02-20 NOTE — PROGRESS NOTES
Problem: Falls - Risk of  Goal: *Absence of Falls  Description: Document Isa Jenkins Fall Risk and appropriate interventions in the flowsheet.   Outcome: Progressing Towards Goal  Note: Fall Risk Interventions:  Mobility Interventions: Assess mobility with egress test         Medication Interventions: Teach patient to arise slowly    Elimination Interventions: Call light in reach              Problem: Patient Education: Go to Patient Education Activity  Goal: Patient/Family Education  Outcome: Progressing Towards Goal     Problem:  Delivery: Postpartum Day 2  Goal: Off Pathway (Use only if patient is Off Pathway)  Outcome: Progressing Towards Goal  Goal: Activity/Safety  Outcome: Progressing Towards Goal  Goal: Consults, if ordered  Outcome: Progressing Towards Goal  Goal: Nutrition/Diet  Outcome: Progressing Towards Goal  Goal: Discharge Planning  Outcome: Progressing Towards Goal  Goal: Medications  Outcome: Progressing Towards Goal  Goal: Treatments/Interventions/Procedures  Outcome: Progressing Towards Goal  Goal: Psychosocial  Outcome: Progressing Towards Goal  Goal: *Vital signs within defined limits  Outcome: Progressing Towards Goal  Goal: *Labs within defined limits  Outcome: Progressing Towards Goal  Goal: *Hemodynamically stable  Outcome: Progressing Towards Goal  Goal: *Optimal pain control at patient's stated goal  Outcome: Progressing Towards Goal  Goal: *Participates in infant care  Outcome: Progressing Towards Goal  Goal: *Demonstrates progressive activity  Outcome: Progressing Towards Goal  Goal: *Appropriate parent-infant bonding  Outcome: Progressing Towards Goal  Goal: *Tolerating diet  Outcome: Progressing Towards Goal

## 2022-02-20 NOTE — PROGRESS NOTES
Progress Note                               Patient: Gonzalo Jose MRN: [id number]  SSN: [id number]    YOB: 1990  Age: 36 y.o. Sex: female      2 Days Post-Op     Subjective:     Patient doing well postpartum without significant complaints. Voiding without difficulty. Patient reports normal lochia. . Breastfeeding: Yes     Objective:     Patient Vitals for the past 18 hrs:   Temp Pulse Resp BP SpO2   22 0741 97.8 °F (36.6 °C) 65 18 105/72 99 %   22 2254 98 °F (36.7 °C) 74 16 125/80 100 %       Temp (24hrs), Av.3 °F (36.8 °C), Min:97.8 °F (36.6 °C), Max:99 °F (37.2 °C)      Physical Exam:    General:   Patient without distress. Abdomen: Soft, fundus firm at level of umbilicus, nontender   Incision: Clean, dry, and intact without erythema   Lower Extremities: Negative for swelling, cords, or tenderness        Lab/Data Review: All lab results for the last 24 hours reviewed. Assessment and Plan:     Patient appears to be having an uncomplicated post- course. Continue routine postoperative care and maternal education. Discharge home tomorrow.         Sandeep Miranda CNM

## 2022-02-20 NOTE — PROGRESS NOTES
0530 Assumed care of patient without report from prior nurse due to nurse being pulled to L&D to take an assignment. 3208 Patient resting. 0710 Verbal shift change report given to ALLEN Guerra RN  (oncoming nurse) by Colby Leslie RN (offgoing nurse). Report included the following information SBAR, Kardex, Procedure Summary, Intake/Output, MAR and Recent Results.

## 2022-02-20 NOTE — PROGRESS NOTES
1905 - Report received from LEMUEL Conteh LPN. Care assumed at this time. 0500 - Care assumed by charge nurse Rajendra Weber RN at this time.

## 2022-02-20 NOTE — PROGRESS NOTES
0710-Bedside and Verbal shift change report given to TAMIKA GuerraRN (oncoming nurse) by SHIRA Baez RN (offgoing nurse). Report given with Efraín BUTLER and MAR.     0741-Assessment complete and WNL. Patient denies complaints and concerns at this time. 1625-Patient tback in room from visiting infants in nursery. Patient denies complaints. Instructed patient to call nurse as needed and patient verbalized understanding. Call bell in reach. 1910-Bedside and Verbal shift change report given to SHIRA Wilder RN (oncoming nurse) by Cornelius Rice RN (offgoing nurse). Report given with Efraín BUTLER and MAR.

## 2022-02-20 NOTE — DISCHARGE SUMMARY
Obstetrical Discharge Summary     Name: Junito Cheema MRN: [id number]  SSN: [id number]    YOB: 1990  Age: 36 y.o. Sex: female      Allergies: Patient has no known allergies. Admit Date: 2022    Discharge Date:2022    Admitting Physician: Ema Nava MD     Attending Physician:  Liberty Quinn MD     * Admission Diagnoses:  premature rupture of membranes (PPROM) with onset of labor after 24 hours of rupture in first trimester, antepartum [O42.111]  Twin pregnancy, twins dichorionic and diamniotic [O30.049]  33 weeks gestation of pregnancy [Z3A.33]    * Discharge Diagnoses:   Information for the patient's :  Gamaliel Reece [[id number]]   Delivery of a 1.9 kg male infant via , Low Transverse on 2022 at 3:24 AM  by Dana Bush. Apgars were 8  and 8 . Information for the patient's :  Gamaliel Reece [[id number]]   Delivery of a 2.24 kg male infant via , Low Vertical on 2022 at 3:26 AM  by Dana Bush. Apgars were 6  and 8 .        Additional Diagnoses:   Hospital Problems as of 2022 Date Reviewed: 2022          Codes Class Noted - Resolved POA    Twin pregnancy, twins dichorionic and diamniotic ICD-10-CM: O30.46  ICD-9-CM: 651.00, V91.03  2022 - Present Yes        33 weeks gestation of pregnancy ICD-10-CM: Z3A.33  ICD-9-CM: V22.2  2022 - Present Yes        * (Principal)  premature rupture of membranes (PPROM) with onset of labor after 24 hours of rupture in first trimester, antepartum ICD-10-CM: O42.111  ICD-9-CM: 658.23  2022 - Present Yes        S/P emergency  section ICD-10-CM: X63.546  ICD-9-CM: V45.89  2022 - Present No             Lab Results   Component Value Date/Time    ABO/Rh(D) A POSITIVE 2022 02:13 AM      Immunization History   Administered Date(s) Administered    DTaP 2022    Influenza Vaccine 2022         Procedure(s):   SECTION           * Discharge Condition: good    [place] Hospital Course: Normal hospital course following the delivery. * Disposition: Home    Discharge Medications:   Current Discharge Medication List      START taking these medications    Details   ibuprofen (MOTRIN) 800 mg tablet Take 1 Tablet by mouth every eight (8) hours as needed for Pain. Qty: 90 Tablet, Refills: 0  Start date: 2022      oxyCODONE-acetaminophen (PERCOCET) 5-325 mg per tablet Take 1 Tablet by mouth every six (6) hours as needed for Pain for up to 5 days. Max Daily Amount: 4 Tablets. Qty: 20 Tablet, Refills: 0  Start date: 2022, End date: 2022    Associated Diagnoses: S/P emergency  section         CONTINUE these medications which have NOT CHANGED    Details   prenatal vit-calcium-iron-fa (Prenatal Plus, calcium carb,) 27 mg iron- 1 mg tab Take 1 Tablet by mouth daily. folic acid 953 mcg tablet Take 400 mcg by mouth daily. escitalopram oxalate (Lexapro) 10 mg tablet Take 20 mg by mouth daily. STOP taking these medications       multivitamin (ONE A DAY) tablet Comments:   Reason for Stopping:         calcium citrate 200 mg (950 mg) tablet Comments:   Reason for Stopping:         cyanocobalamin (VITAMIN B12) 500 mcg tablet Comments:   Reason for Stopping:         ferrous sulfate (Iron) 325 mg (65 mg iron) tablet Comments:   Reason for Stopping:         ondansetron (ZOFRAN ODT) 4 mg disintegrating tablet Comments:   Reason for Stopping:               * Follow-up Care/Patient Instructions:   Activity: No lifting, Driving, or Strenuous exercise for 6 weeks and No sex for 6 weeks  Diet: Regular Diet  Wound Care: Keep wound clean and dry  Follow up with OB provider in 6 weeks    Follow-up Information     Follow up With Specialties Details Why Contact Info    Rivera Garcia MD Internal Medicine   [address] [place] [id number] [phone]                 Francisco Li CNM

## 2022-02-21 VITALS
SYSTOLIC BLOOD PRESSURE: 123 MMHG | WEIGHT: 202 LBS | DIASTOLIC BLOOD PRESSURE: 75 MMHG | RESPIRATION RATE: 16 BRPM | TEMPERATURE: 98 F | HEART RATE: 55 BPM | HEIGHT: 62 IN | OXYGEN SATURATION: 99 % | BODY MASS INDEX: 37.17 KG/M2

## 2022-02-21 DIAGNOSIS — Z98.891 S/P EMERGENCY CESAREAN SECTION: ICD-10-CM

## 2022-02-21 PROBLEM — Z3A.33 33 WEEKS GESTATION OF PREGNANCY: Status: RESOLVED | Noted: 2022-02-18 | Resolved: 2022-02-21

## 2022-02-21 PROBLEM — O30.049 TWIN PREGNANCY, TWINS DICHORIONIC AND DIAMNIOTIC: Status: RESOLVED | Noted: 2022-02-18 | Resolved: 2022-02-21

## 2022-02-21 LAB
BACTERIA SPEC CULT: NORMAL
SERVICE CMNT-IMP: NORMAL

## 2022-02-21 PROCEDURE — 74011250637 HC RX REV CODE- 250/637: Performed by: ADVANCED PRACTICE MIDWIFE

## 2022-02-21 PROCEDURE — 90707 MMR VACCINE SC: CPT | Performed by: ADVANCED PRACTICE MIDWIFE

## 2022-02-21 PROCEDURE — 74011250636 HC RX REV CODE- 250/636: Performed by: ADVANCED PRACTICE MIDWIFE

## 2022-02-21 RX ORDER — OXYCODONE AND ACETAMINOPHEN 5; 325 MG/1; MG/1
1 TABLET ORAL
Qty: 20 TABLET | Refills: 0 | Status: SHIPPED | OUTPATIENT
Start: 2022-02-21 | End: 2022-02-26

## 2022-02-21 RX ADMIN — OXYCODONE AND ACETAMINOPHEN 1 TABLET: 5; 325 TABLET ORAL at 02:25

## 2022-02-21 RX ADMIN — MEASLES, MUMPS, AND RUBELLA VIRUS VACCINE LIVE 0.5 ML: 1000; 12500; 1000 INJECTION, POWDER, LYOPHILIZED, FOR SUSPENSION SUBCUTANEOUS at 09:56

## 2022-02-21 RX ADMIN — ESCITALOPRAM OXALATE 10 MG: 10 TABLET ORAL at 08:28

## 2022-02-21 NOTE — PROGRESS NOTES
1910 Verbal shift change report given to ELVA Melchor RN (oncoming nurse) by Astrid Fountain RN (offgoing nurse). Report included the following information SBAR, Kardex, Procedure Summary, Intake/Output, MAR and Recent Results. 1945 Assessment completed. 2225 Patient pumping. Patient given pain medication. 2355 Verbal report given to ALLEN Kebede RN (oncoming nurse) by Reji Gomez RN (offgoing nurse). Report included the following information SBAR, Kardex, Procedure Summary, Intake/Output, MAR and Recent Results.

## 2022-02-21 NOTE — PROGRESS NOTES
0715- Bedside and Verbal shift change report given to Stefan (oncoming nurse) by Kermit Swanson (offgoing nurse). Report included the following information SBAR, Intake/Output, MAR and Recent Results. 0830- pt medicated per MAR. Shift assessment complete, see flowsheets     0359- pt medicated per [facility]    1035- I have reviewed discharge instructions with the patient. The patient verbalized understanding. Patient armband removed and shredded    1135- pt discharged home in stable condition.  Pt ambulating to NICU to see infants

## 2022-02-21 NOTE — PROGRESS NOTES
Problem: Falls - Risk of  Goal: *Absence of Falls  Description: Document Ryne Hernandez Fall Risk and appropriate interventions in the flowsheet.   Outcome: Progressing Towards Goal  Note: Fall Risk Interventions:  Mobility Interventions: Assess mobility with egress test         Medication Interventions: Teach patient to arise slowly    Elimination Interventions: Call light in reach              Problem: Patient Education: Go to Patient Education Activity  Goal: Patient/Family Education  Outcome: Progressing Towards Goal     Problem:  Delivery: Postpartum Day 3  Goal: Activity/Safety  Outcome: Progressing Towards Goal  Goal: Consults, if ordered  Outcome: Progressing Towards Goal  Goal: Nutrition/Diet  Outcome: Progressing Towards Goal  Goal: Discharge Planning  Outcome: Progressing Towards Goal  Goal: Medications  Outcome: Progressing Towards Goal  Goal: Treatments/Interventions/Procedures  Outcome: Progressing Towards Goal  Goal: Psychosocial  Outcome: Progressing Towards Goal     Problem:  Delivery: Discharge Outcomes  Goal: *Follow-up appointments as indicated  Outcome: Progressing Towards Goal  Goal: *Describes available resources and support systems  Outcome: Progressing Towards Goal  Goal: *No signs and symptoms of infection  Outcome: Progressing Towards Goal  Goal: *Birth certificate information completed  Outcome: Progressing Towards Goal  Goal: *Received and verbalizes understanding of discharge plan and instructions  Outcome: Progressing Towards Goal  Goal: *Vital signs within defined limits  Outcome: Progressing Towards Goal  Goal: *Labs within defined limits  Outcome: Progressing Towards Goal  Goal: *Hemodynamically stable  Outcome: Progressing Towards Goal  Goal: *Optimal pain control at patient's stated goal  Outcome: Progressing Towards Goal  Goal: *Participates in infant care  Outcome: Progressing Towards Goal  Goal: *Demonstrates progressive activity  Outcome: Progressing Towards Goal  Goal: *Appropriate parent-infant bonding  Outcome: Progressing Towards Goal  Goal: *Tolerating diet  Outcome: Progressing Towards Goal  Goal: *Verbalizes name, dosage, time, side effects, and number of days to continue medications  Outcome: Progressing Towards Goal  Goal: *Influenza vaccine administered (October-March)  Outcome: Progressing Towards Goal     Problem: Pain  Goal: *Control of Pain  Outcome: Progressing Towards Goal

## 2022-02-21 NOTE — DISCHARGE INSTRUCTIONS
POST DELIVERY DISCHARGE INSTRUCTIONS    Name: Lobo Wynn  YOB: 1990  Primary Diagnosis: Principal Problem:     premature rupture of membranes (PPROM) with onset of labor after 24 hours of rupture in first trimester, antepartum (2022)    Active Problems:    Twin pregnancy, twins dichorionic and diamniotic (2022)      33 weeks gestation of pregnancy (2022)      S/P emergency  section (2022)        General:     Diet/Diet Restrictions:  Eight 8-ounce glasses of fluid daily (water, juices); avoid excessive caffeine intake. Meals/snacks as desired which are high in fiber and carbohydrates and low in fat and cholesterol. Physical Activity / Restrictions / Safety:     Avoid heavy lifting, no more that 8 lbs. For 2-3 weeks; limit use of stairs to 2 times daily for the first week home. No driving for one week. Avoid intercourse 4-6 weeks, no douching or tampon use. Check with obstetrician before starting or resuming an exercise program.         Discharge Instructions/Special Treatment/Home Care Needs:     Continue prenatal vitamins. Continue to use squirt bottle with warm water on your episiotomy after each bathroom use until bleeding stops. Call your doctor for the following:     Fever over 101 degrees by mouth. Vaginal bleeding heavier than a normal menstrual period or clot larger than a golf ball. Red streaks or increased swelling of legs, painful red streaks on your breast.  Painful urination, constipation and increased pain or swelling  If you feel extremely anxious or overwhelmed. If you have thoughts of harming yourself and/or your baby. Pain Management:     Pain Management:   Take Acetaminophen (Tylenol) or Ibuprofen (Advil, Motrin), as directed for pain. Use a warm Sitz bath 3 times daily to relieve episiotomy or hemorrhoidal discomfort. For hemorrhoidal discomfort, use Tucks and Anusol cream as needed and directed. Follow-Up Care:      These are general instructions for a healthy lifestyle:    No smoking/ No tobacco products/ Avoid exposure to second hand smoke    Surgeon General's Warning:  Quitting smoking now greatly reduces serious risk to your health. Obesity, smoking, and sedentary lifestyle greatly increases your risk for illness    A healthy diet, regular physical exercise & weight monitoring are important for maintaining a healthy lifestyle    Recognize signs and symptoms of STROKE:    F-face looks uneven    A-arms unable to move or move unevenly    S-speech slurred or non-existent    T-time-call 911 as soon as signs and symptoms begin-DO NOT go       Back to bed or wait to see if you get better-TIME IS BRAIN. Patient Education        Depression After Childbirth: Care Instructions  Overview     Many women get the \"baby blues\" during the first few days after childbirth. You may lose sleep, feel irritable, and cry easily. You may feel happy one minute and sad the next. Hormone changes are one cause of these emotional changes. Also, the demands of a new baby, along with visits from relatives or other family needs, can add to the stress. The \"baby blues\" often peak around the fourth day. Then they ease up in less than 2 weeks. If your moodiness or anxiety lasts for more than 2 weeks, or if you feel like life is not worth living, you may have postpartum depression. This is different for each person. Some mothers with serious depression may worry intensely about their infant's well-being. Others may feel distant from their child. Some mothers may even feel that they might harm their baby. Some may have signs of paranoia, wondering if someone is watching them. Depression is not a sign of weakness. It's a medical condition that requires treatment. Medicine and counseling often work well to reduce depression. Talk to your doctor about taking antidepressant medicine while breastfeeding. Follow-up care is a key part of your treatment and safety. Be sure to make and go to all appointments, and call your doctor if you are having problems. It's also a good idea to know your test results and keep a list of the medicines you take. How do you know if you are depressed? With all the changes in your life, you may not know if you are depressed. Pregnancy sometimes causes changes in how you feel that are similar to the symptoms of depression. Symptoms of depression include:  · Feeling sad or hopeless and losing interest in daily activities. These are the most common symptoms of depression. · Sleeping too much or not enough. · Feeling tired. You may feel as if you have no energy. · Eating too much or too little. · Writing or talking about death, such as writing suicide notes or talking about guns, knives, or pills. Keep the numbers for these national suicide hotlines: 2-755-865-TALK (4-540.225.8813) and 6-841-LBYPEGN (8-155.148.3724). If you or someone you know talks about suicide or feeling hopeless, get help right away. How can you care for yourself at home? · Be safe with medicines. Take your medicines exactly as prescribed. Call your doctor if you think you are having a problem with your medicine. · Eat a healthy diet so that you can keep up your energy. · Get regular daily exercise, such as walks, to help improve your mood. · Get as much sunlight as possible. Keep your shades and curtains open. Get outside as much as you can. · Avoid using alcohol or other substances to feel better. · Get as much rest and sleep as possible. Avoid doing too much. Being too tired can increase depression. · Play stimulating music throughout your day and soothing music at night. · Schedule outings and visits with friends and family. Ask them to call you regularly, so that you don't feel alone. · Ask for help with preparing food and other daily tasks. Family and friends are often happy to help with a . · Be honest with yourself and those who care about you. Tell them about your struggle. · Join a support group of new mothers. No one can better understand the challenges of caring for a  than other new mothers. · If you feel like life is not worth living or you're feeling hopeless, get help right away. Keep the numbers for these national suicide hotlines: 3-766-744-TALK (7-575.396.4585) and 4-983-SURXDFT (9-341.165.8300). When should you call for help? Call 911 anytime you think you may need emergency care. For example, call if:    · You feel you cannot stop from hurting yourself, your baby, or someone else. Call your doctor now or seek immediate medical care if:    · You are having trouble caring for yourself or your baby.     · You hear voices. Watch closely for changes in your health, and be sure to contact your doctor if:    · You have problems with your depression medicine.     · You do not get better as expected. Where can you learn more? Go to http://www.gray.com/  Enter [id number] in the search box to learn more about \"Depression After Childbirth: Care Instructions. \"  Current as of: 2021               Content Version: 13.0   [employer]. Care instructions adapted under license by [employer] (which disclaims liability or warranty for this information). If you have questions about a medical condition or this instruction, always ask your healthcare professional. [place] [address] any warranty or liability for your use of this information.

## 2022-02-21 NOTE — PROGRESS NOTES
Bedside and Verbal shift change report given to Paresh Mclean RN   (oncoming nurse) by Paul Lazaro RN (offgoing nurse). Report included the following information SBAR, Kardex, Procedure Summary, Intake/Output, MAR and Recent Results. 0015 Pt sleeping. Significant other in shower. 0220 Pt requested pain medication. Focus assessment completed. 0715 Bedside and Verbal shift change report given to CLARKE Baker RN (oncoming nurse) by Paresh Mclean RN   (offgoing nurse). Report included the following information SBAR, Kardex, Procedure Summary, Intake/Output, MAR and Recent Results.

## 2022-03-18 PROBLEM — Z98.891 S/P EMERGENCY CESAREAN SECTION: Status: ACTIVE | Noted: 2022-02-18

## 2022-03-18 PROBLEM — E66.01 MORBID OBESITY WITH BMI OF 45.0-49.9, ADULT (HCC): Status: ACTIVE | Noted: 2020-11-23

## 2022-03-19 PROBLEM — Z98.84 GASTRIC BYPASS STATUS FOR OBESITY: Status: ACTIVE | Noted: 2020-11-24

## 2022-03-19 PROBLEM — O42.111 PRETERM PREMATURE RUPTURE OF MEMBRANES (PPROM) WITH ONSET OF LABOR AFTER 24 HOURS OF RUPTURE IN FIRST TRIMESTER, ANTEPARTUM: Status: ACTIVE | Noted: 2022-02-18

## 2022-03-19 PROBLEM — E66.01 OBESITY, MORBID (HCC): Status: ACTIVE | Noted: 2020-02-25

## 2023-01-26 ENCOUNTER — OFFICE VISIT (OUTPATIENT)
Dept: ORTHOPEDIC SURGERY | Age: 33
End: 2023-01-26
Payer: COMMERCIAL

## 2023-01-26 DIAGNOSIS — R20.0 NUMBNESS AND TINGLING IN BOTH HANDS: ICD-10-CM

## 2023-01-26 DIAGNOSIS — R20.0 NUMBNESS AND TINGLING IN RIGHT HAND: Primary | ICD-10-CM

## 2023-01-26 DIAGNOSIS — R20.2 NUMBNESS AND TINGLING IN BOTH HANDS: ICD-10-CM

## 2023-01-26 DIAGNOSIS — R20.2 NUMBNESS AND TINGLING IN RIGHT HAND: Primary | ICD-10-CM

## 2023-01-26 PROCEDURE — 99203 OFFICE O/P NEW LOW 30 MIN: CPT | Performed by: PHYSICAL MEDICINE & REHABILITATION

## 2023-01-26 RX ORDER — SERTRALINE HYDROCHLORIDE 50 MG/1
TABLET, FILM COATED ORAL
COMMUNITY
Start: 2022-12-29

## 2023-01-26 RX ORDER — ONDANSETRON 4 MG/1
4 TABLET, ORALLY DISINTEGRATING ORAL
COMMUNITY
Start: 2021-01-20 | End: 2023-01-26

## 2023-01-26 RX ORDER — BUSPIRONE HYDROCHLORIDE 7.5 MG/1
7.5 TABLET ORAL 2 TIMES DAILY
COMMUNITY
Start: 2022-12-29

## 2023-01-26 NOTE — LETTER
NAME: Shayy Kirk  : 1990  MRN: [id number]    PROCEDURAL INFORMED CONSENT FOR OPERATION / PROCEDURE     1. I (we),      Shayy Kirk      authorize    John Pickard MD       and/or such assistants as may be selected by him/her, to perform the following operation/procedures    Bilateral carpal tunnel injections ultrasound guided      Note: If unable to obtain consent prior to an emergent procedure, document the emergent reason in the medical record. This procedure has been explained to my (our) satisfaction and included in the explanation was:   A) the intended benefit, nature, and extent of the procedure to be performed;  B) the significant risks involved and the probability of success;  C) alternative procedures and methods of treatment;  D) the dangers and probable consequences of such alternatives (including no procedure or treatment); E) the expected consequences of the procedure on my future health;  F) whether other qualified individuals would be performing important surgical tasks and / or whether  would be present to advise or support the procedure. I (we) understand that there are other risks of infection and other serious complications in the pre-operative/procedural and postoperative/procedural stages of my (our) care. I (we) have asked all of the questions which I (we) thought were important in deciding whether or not to undergo treatment or diagnosis. These questions have been answered to my (our) satisfaction. I (we) understand that no assurance can be given that the procedure will be a success, and no guarantee or warranty of success has been given to me (us). 2.  It has been explained to me (us) that during the course of the operation/procedure, unforeseen conditions may be revealed that necessitate extension of the original procedure(s) or different procedure(s) than those set forth in Paragraph 1.  I (we) authorize and request that the above-named physician, his/her assistants or his/her designees, perform procedures as necessary and desirable if deemed to be in my (our) best interest.    3.  I acknowledge that other health care personnel may be observing this procedure for the purpose of medical education or other specified purposes as may be necessary as requested and/or approved by my (our) physician. 4.  I (we) consent to the disposal by the hospital Pathologist of the removed tissue, parts or organs in accordance with hospital policy. Page 1 of 2    NAME: Chinyere Granados  : 1990  MRN: [id number]    2. I do_____ do not______ consent to the use of a local infiltration pain blocking agent that will be used by my provider/surgical provider to help alleviate pain during my procedure. 6.  I do_____ do not_____ consent to an emergent blood transfusion in the case of a life-threatening situation that requires blood components to be administered. This consent is valid for 24 hours from the beginning of the procedure. 7.  This patient does _____ or does not ______currently have a DNR status/order. If DNR order is in place, obtain Addendum to the Surgical Consent for ALL Patients with a DNR Order to address david-operative status for limited intervention or DNR suspension. 8.  I have read and fully understand the above Consent for Operation/Procedure and that all blanks were completed before I signed the consent.       [facility] of Patient (or legal representative)  Printed Name / Miya Busby                2023 /         AM / PM   Witness to Signature  Printed Name   Date/Time        (If patient is unable to sign or is a minor, complete the following)               Patient is a minor, ____years of age, or unable to sign because: _____________________          ________________________________________________________________________  Sabrina Potter If a phone consent is obtained, consent will be documented by using two health care professionals, each affirming that the consenting party   has no questions and gives consent for the procedure discussed with the physician/provider. 1/26/2023 /               AM / PM   2nd Witness to phone consent  Printed Name   Date/Time     Informed Consent:  I have provided the explanation described above in section 1 to the patient and/or legal representative. I have provided the patient and/or legal representative with an opportunity to ask any questions about the proposed operation/procedure. Haresh Pinzon MD    1/26/2023   /            AM / PM   Provider / Proceduralist  Printed Name  Date/Time     This Provider / Jorje Jonas performing the surgery is ONLY for Office-based procedures in [place]   [ x] Board certified or Board eligible by one of the [employer] of [place], the [employer] of [facility] the [employer], the [employer] of Podiatric Medicine, the [employer] of Foot and Ankle Surgery, or other board as approved by the [facility] for medical staff appointment.             Page 2 of 2  Revised 8/2/2021

## 2023-01-26 NOTE — PROGRESS NOTES
[address]  [address]  [address]  Phone: [phone]  Fax: [phone]      Patient: Darrion Woods                                                                              MRN: [id number]        YOB: 1990          AGE: 29 y.o. PCP: Domitila Pelletier MD  Date:  23    Reason for Consultation: Hand Pain (Bilateral)      HPI:  Darrion Woods is a 29 y.o. female right handed with relevant PMH of gastric bypass  who presents with bilateral hand pain and numbness and tingling. She reports she had similar symptoms in 2017 and had carpal tunnel injections and her symptoms resolved. She now has 3year old twins and the pain in bilateral hands has returned. Neurologic symptoms: No numbness, tingling, weakness, bowel or bladder changes. No recent falls      Location: The pain is located in the bilateral hands R>L, throughout the hands    Radiation: The pain does not radiate . Pain Score: Currently: 8/10    Quality: Pain is of a Burning, Tingling, and Numbness quality. Aggravating: Pain is exacerbated by  using hand, at night  , bending elbow and bending wrist   Alleviating: The pain is alleviated by nothing    Prior Treatments:  Physical therapy: NO  Injections:YES  CTS-injections about 5 years ago   Previous Medications:   Current Medications:  Previous work-up has included:   X-ray right wrist 17  FINDINGS: There are no acute fractures or subluxation. Soft tissues are unremarkable. There is normal bone mineralization.      Past Medical History:   Past Medical History:   Diagnosis Date    Anxiety     Carpal tunnel syndrome     HLD (hyperlipidemia)     Obesity     PCOS (polycystic ovarian syndrome)     Psychiatric problem       Past Surgical History:   Past Surgical History:   Procedure Laterality Date    HX  SECTION      HX  SECTION  2016    HX TONSIL AND ADENOIDECTOMY      HX TONSILLECTOMY      NH GASTRIC RSTCV W/BYP W/SHORT LIMB 150 CM/<        SocHx:   Social History     Tobacco Use    Smoking status: Never    Smokeless tobacco: Never   Substance Use Topics    Alcohol use: Not Currently     Comment: socially      FamHx:? Family History   Problem Relation Age of Onset    Diabetes Mother     Cancer Father        Current Medications:    Current Outpatient Medications   Medication Sig Dispense Refill    busPIRone (BUSPAR) 7.5 mg tablet Take 7.5 mg by mouth two (2) times a day. sertraline (ZOLOFT) 50 mg tablet TAKE 1 & 1/2 (ONE & ONE-HALF) TABLETS BY MOUTH ONCE DAILY      ibuprofen (MOTRIN) 800 mg tablet Take 1 Tablet by mouth every eight (8) hours as needed for Pain. 90 Tablet 0    prenatal vit-calcium-iron-fa (Prenatal Plus, calcium carb,) 27 mg iron- 1 mg tab Take 1 Tablet by mouth daily. Allergies:  No Known Allergies     Review of Systems:   Gen:    Denied fevers, chills, malaise, fatigue, weight changes   Resp: Denied shortness of breath, cough, wheezing   CVS: Denied chest pain, palpitations   : Denied urinary urgency, frequency, incontinence   GI: Denied nausea, vomiting, constipation, diarrhea   Skin: Denied rashes, wounds   Psych: Denied anxiety, depression   Vasc: Denied claudication, ulcers   Hem: Denied easy bruising/bleeding   MSK: See HPI   Neuro: See HPI         Physical Exam     Vital Signs: There were no vitals taken for this visit. General: ??????? Well nourished and well developed female without any acute distress   Psychiatric: ?  Alert and oriented x 3 with normal mood    HEENT: ???????? Atraumatic   Respiratory:   Breathing non-labored and non dyspneic   CV: ???????????????? Peripheral pulses intact, no peripheral edema   Skin: ????????????? No rashes       Neurologic: ??       Sensation: normal and grossly intact thebilateral, upper extremity(s)   Strength: 5/5 in the bilateral, upper extremity(s)   Reflexes: reveals 2+ symmetric DTRs throughout UE Gait: normal and tandem gait   Upper tract signs: Babinski down going, Lopez's negative ? Musculoskeletal: Wrist/Hand Exam    Alignment: Normal  Atrophy: None   Effusion: None   Ganglion appreciated: No      Tenderness to Palpation:   Anatomic snuffbox:Negative  Base of the thumb: Negative  Dorsal radioulnar joint: Negative   Scapholunate interval: Negative  TFCC: Negative  A1 Pulley/Flexor tendon junction: Negative    ROM:   ROM with wrist dorsiflexion, plantarflexion, radial and ulnar deviation: Normal  Pain with passive ROM: Negative  Pain with resisted ROM: Negative    Special Tests:   Carpal compression: Negative  Phalen's: Positive right   Tinel at the wrist: Negative  Finkelstein's: Negative  Scaphoid grind: Negative  Tinel at elbow :+ bilateral  +  ulnar nerve tension b/l  -median nerve tension        Medical Decision Making:    Images: The imaging results as well as the actual images of the studies below were reviewed, visualized and interpreted by me. Labs: The results below were reviewed. 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 Assessment:   - carpal tunnel vs ulnar neuritis    Plan:      -Physical therapy - nerve gliding exercises  -DME CTS splint, discussed trying cubital tunnel splint    -Medications - NA. Counseled regarding side effects and appropriate administration of medications.    -Diagnostics/Imaging - EMG upper extremities    -Injections - Consider carpal tunnel vs cubital tunnel injection    -Lifestyle - Discussed ergonomics   -Education - The patient's diagnosis, prognosis and treatment options were discussed today. All questions were answered.     F/U - will call with EMG results    [address] [facility] no depression/no anxiety

## 2023-02-13 ENCOUNTER — TELEPHONE (OUTPATIENT)
Age: 33
End: 2023-02-13

## 2023-08-04 ENCOUNTER — HOSPITAL ENCOUNTER (OUTPATIENT)
Facility: HOSPITAL | Age: 33
Discharge: HOME | End: 2023-08-04
Payer: COMMERCIAL

## 2023-08-04 DIAGNOSIS — G56.01 CARPAL TUNNEL SYNDROME ON RIGHT: ICD-10-CM

## 2023-08-04 LAB
ALBUMIN SERPL-MCNC: 3.7 G/DL (ref 3.4–5)
ALBUMIN/GLOB SERPL: 1.1 (ref 0.8–1.7)
ALP SERPL-CCNC: 73 U/L (ref 45–117)
ALT SERPL-CCNC: 19 U/L (ref 13–56)
ANION GAP SERPL CALC-SCNC: 3 MMOL/L (ref 3–18)
AST SERPL-CCNC: 13 U/L (ref 10–38)
BILIRUB SERPL-MCNC: 0.3 MG/DL (ref 0.2–1)
BUN SERPL-MCNC: 15 MG/DL (ref 7–18)
BUN/CREAT SERPL: 19 (ref 12–20)
CALCIUM SERPL-MCNC: 9.2 MG/DL (ref 8.5–10.1)
CHLORIDE SERPL-SCNC: 110 MMOL/L (ref 100–111)
CO2 SERPL-SCNC: 27 MMOL/L (ref 21–32)
CREAT SERPL-MCNC: 0.79 MG/DL (ref 0.6–1.3)
EKG ATRIAL RATE: 78 BPM
EKG DIAGNOSIS: NORMAL
EKG P AXIS: 71 DEGREES
EKG P-R INTERVAL: 154 MS
EKG Q-T INTERVAL: 372 MS
EKG QRS DURATION: 86 MS
EKG QTC CALCULATION (BAZETT): 424 MS
EKG R AXIS: 82 DEGREES
EKG T AXIS: 57 DEGREES
EKG VENTRICULAR RATE: 78 BPM
ERYTHROCYTE [DISTWIDTH] IN BLOOD BY AUTOMATED COUNT: 12.5 % (ref 11.6–14.5)
GLOBULIN SER CALC-MCNC: 3.5 G/DL (ref 2–4)
GLUCOSE SERPL-MCNC: 88 MG/DL (ref 74–99)
HCT VFR BLD AUTO: 37.5 % (ref 35–45)
HGB BLD-MCNC: 11.8 G/DL (ref 12–16)
MCH RBC QN AUTO: 26.5 PG (ref 24–34)
MCHC RBC AUTO-ENTMCNC: 31.5 G/DL (ref 31–37)
MCV RBC AUTO: 84.3 FL (ref 78–100)
NRBC # BLD: 0 K/UL (ref 0–0.01)
NRBC BLD-RTO: 0 PER 100 WBC
PLATELET # BLD AUTO: 236 K/UL (ref 135–420)
PMV BLD AUTO: 11.4 FL (ref 9.2–11.8)
POTASSIUM SERPL-SCNC: 4.6 MMOL/L (ref 3.5–5.5)
PROT SERPL-MCNC: 7.2 G/DL (ref 6.4–8.2)
RBC # BLD AUTO: 4.45 M/UL (ref 4.2–5.3)
SODIUM SERPL-SCNC: 140 MMOL/L (ref 136–145)
WBC # BLD AUTO: 7.2 K/UL (ref 4.6–13.2)

## 2023-08-04 PROCEDURE — 80053 COMPREHEN METABOLIC PANEL: CPT

## 2023-08-04 PROCEDURE — 93005 ELECTROCARDIOGRAM TRACING: CPT

## 2023-08-04 PROCEDURE — 36415 COLL VENOUS BLD VENIPUNCTURE: CPT

## 2023-08-04 PROCEDURE — 85027 COMPLETE CBC AUTOMATED: CPT

## 2023-08-17 PROBLEM — G56.01 RIGHT CARPAL TUNNEL SYNDROME: Status: ACTIVE | Noted: 2023-08-17

## 2023-08-17 RX ORDER — SODIUM CHLORIDE 0.9 % (FLUSH) 0.9 %
5-40 SYRINGE (ML) INJECTION PRN
Status: CANCELLED | OUTPATIENT
Start: 2023-08-17

## 2023-08-17 RX ORDER — SODIUM CHLORIDE 9 MG/ML
INJECTION, SOLUTION INTRAVENOUS PRN
Status: CANCELLED | OUTPATIENT
Start: 2023-08-17

## 2023-08-17 RX ORDER — SODIUM CHLORIDE 0.9 % (FLUSH) 0.9 %
5-40 SYRINGE (ML) INJECTION EVERY 12 HOURS SCHEDULED
Status: CANCELLED | OUTPATIENT
Start: 2023-08-17

## 2023-08-17 NOTE — H&P
Patient Name:  Audi Morgan    YOB: 1990    Chief Complaint:  Bilateral hand numbness and tingling. History of Chief Complaint:  Ms. Jose Hoyos is a 21-year-old female who is being seen for bilateral hand numbness and tingling. She says she has had significant problems with both hands for the past five to six years. She says she had an injection done about five years ago which seemed to help for quite a while. Then about six months ago she began having worsening numbness and tingling. She is waking up at night because of pain. She has difficulty with turning and twisting. Past Medical/Surgical History:    Disease/Disorder Date Side Surgery Date Side Comment   Anxiety             section         Gastric bypass         Tonsillectomy        Allergies:  No known allergies. Ingredient Reaction Medication Name Comment   NO KNOWN ALLERGIES        Current Medications:    Medication Directions   acetaminophen 300 mg-codeine 30 mg tablet TAKE 1 TABLET BY MOUTH EVERY 6 TO 8 HOURS AS NEEDED FOR PAIN   buspirone 10 mg tablet TAKE 1 TABLET BY MOUTH TWICE DAILY   sertraline 50 mg tablet      Social History:      SMOKING  Status Tobacco Type Units Per Day Yrs Used   Never smoker        ALCOHOL  There is a history of alcohol use. Type: cider. consumed occasionally. Family History:    Disease Detail Family Member Age Cause of Death Comments   Alcoholism Mother      Hypertension Mother      Hypertension Father      Cancer, unknown Father      Alcoholism Father      Cancer, unknown Sister        Review of Systems:    GENERAL:  Patient has no signs of chills or weight change. , fever or fatigue  HEAD/ENTM:  Patient has no signs of headaches, dizziness, hearing loss, sore throat, recent cold, double vision, blurred vision, itchy eyes, eye redness or eye discharge. or ringing in ears  NEUROLOGIC: Patient presents with numbness/tingling.  Patient has no signs of muscle weakness, loss of balance or seizure

## 2023-08-23 ENCOUNTER — HOSPITAL ENCOUNTER (OUTPATIENT)
Facility: HOSPITAL | Age: 33
Setting detail: OUTPATIENT SURGERY
Discharge: HOME | End: 2023-08-23
Attending: ORTHOPAEDIC SURGERY | Admitting: ORTHOPAEDIC SURGERY
Payer: COMMERCIAL

## 2023-08-23 ENCOUNTER — ANESTHESIA (OUTPATIENT)
Facility: HOSPITAL | Age: 33
End: 2023-08-23
Payer: COMMERCIAL

## 2023-08-23 ENCOUNTER — ANESTHESIA EVENT (OUTPATIENT)
Facility: HOSPITAL | Age: 33
End: 2023-08-23
Payer: COMMERCIAL

## 2023-08-23 VITALS
HEIGHT: 62 IN | DIASTOLIC BLOOD PRESSURE: 75 MMHG | TEMPERATURE: 97.4 F | WEIGHT: 205.3 LBS | HEART RATE: 59 BPM | RESPIRATION RATE: 16 BRPM | BODY MASS INDEX: 37.78 KG/M2 | OXYGEN SATURATION: 99 % | SYSTOLIC BLOOD PRESSURE: 119 MMHG

## 2023-08-23 DIAGNOSIS — Z01.818 PRE-OP TESTING: Primary | ICD-10-CM

## 2023-08-23 DIAGNOSIS — G56.01 RIGHT CARPAL TUNNEL SYNDROME: ICD-10-CM

## 2023-08-23 LAB — HCG UR QL: NEGATIVE

## 2023-08-23 PROCEDURE — 81025 URINE PREGNANCY TEST: CPT

## 2023-08-23 PROCEDURE — 6360000002 HC RX W HCPCS: Performed by: ORTHOPAEDIC SURGERY

## 2023-08-23 PROCEDURE — 2580000003 HC RX 258: Performed by: ORTHOPAEDIC SURGERY

## 2023-08-23 PROCEDURE — 7100000000 HC PACU RECOVERY - FIRST 15 MIN: Performed by: ORTHOPAEDIC SURGERY

## 2023-08-23 PROCEDURE — 3600000002 HC SURGERY LEVEL 2 BASE: Performed by: ORTHOPAEDIC SURGERY

## 2023-08-23 PROCEDURE — 3700000000 HC ANESTHESIA ATTENDED CARE: Performed by: ORTHOPAEDIC SURGERY

## 2023-08-23 PROCEDURE — 2709999900 HC NON-CHARGEABLE SUPPLY: Performed by: ORTHOPAEDIC SURGERY

## 2023-08-23 PROCEDURE — 7100000001 HC PACU RECOVERY - ADDTL 15 MIN: Performed by: ORTHOPAEDIC SURGERY

## 2023-08-23 PROCEDURE — 7100000011 HC PHASE II RECOVERY - ADDTL 15 MIN: Performed by: ORTHOPAEDIC SURGERY

## 2023-08-23 PROCEDURE — 2500000003 HC RX 250 WO HCPCS: Performed by: NURSE ANESTHETIST, CERTIFIED REGISTERED

## 2023-08-23 PROCEDURE — 3600000012 HC SURGERY LEVEL 2 ADDTL 15MIN: Performed by: ORTHOPAEDIC SURGERY

## 2023-08-23 PROCEDURE — 7100000010 HC PHASE II RECOVERY - FIRST 15 MIN: Performed by: ORTHOPAEDIC SURGERY

## 2023-08-23 PROCEDURE — 3700000001 HC ADD 15 MINUTES (ANESTHESIA): Performed by: ORTHOPAEDIC SURGERY

## 2023-08-23 PROCEDURE — 6360000002 HC RX W HCPCS: Performed by: NURSE ANESTHETIST, CERTIFIED REGISTERED

## 2023-08-23 RX ORDER — PROPOFOL 10 MG/ML
INJECTION, EMULSION INTRAVENOUS PRN
Status: DISCONTINUED | OUTPATIENT
Start: 2023-08-23 | End: 2023-08-23 | Stop reason: SDUPTHER

## 2023-08-23 RX ORDER — FENTANYL CITRATE 50 UG/ML
INJECTION, SOLUTION INTRAMUSCULAR; INTRAVENOUS PRN
Status: DISCONTINUED | OUTPATIENT
Start: 2023-08-23 | End: 2023-08-23 | Stop reason: SDUPTHER

## 2023-08-23 RX ORDER — ONDANSETRON 2 MG/ML
4 INJECTION INTRAMUSCULAR; INTRAVENOUS
Status: DISCONTINUED | OUTPATIENT
Start: 2023-08-23 | End: 2023-08-23 | Stop reason: HOSPADM

## 2023-08-23 RX ORDER — OXYCODONE HYDROCHLORIDE 5 MG/1
10 TABLET ORAL PRN
Status: DISCONTINUED | OUTPATIENT
Start: 2023-08-23 | End: 2023-08-23 | Stop reason: HOSPADM

## 2023-08-23 RX ORDER — ONDANSETRON 2 MG/ML
INJECTION INTRAMUSCULAR; INTRAVENOUS PRN
Status: DISCONTINUED | OUTPATIENT
Start: 2023-08-23 | End: 2023-08-23 | Stop reason: SDUPTHER

## 2023-08-23 RX ORDER — HYDROCODONE BITARTRATE AND ACETAMINOPHEN 5; 325 MG/1; MG/1
1-2 TABLET ORAL EVERY 6 HOURS PRN
Qty: 30 TABLET | Refills: 0 | Status: SHIPPED | OUTPATIENT
Start: 2023-08-23 | End: 2023-08-28

## 2023-08-23 RX ORDER — MIDAZOLAM HYDROCHLORIDE 1 MG/ML
INJECTION INTRAMUSCULAR; INTRAVENOUS PRN
Status: DISCONTINUED | OUTPATIENT
Start: 2023-08-23 | End: 2023-08-23 | Stop reason: SDUPTHER

## 2023-08-23 RX ORDER — KETOROLAC TROMETHAMINE 30 MG/ML
INJECTION, SOLUTION INTRAMUSCULAR; INTRAVENOUS PRN
Status: DISCONTINUED | OUTPATIENT
Start: 2023-08-23 | End: 2023-08-23 | Stop reason: SDUPTHER

## 2023-08-23 RX ORDER — BUPIVACAINE HYDROCHLORIDE 2.5 MG/ML
INJECTION, SOLUTION EPIDURAL; INFILTRATION; INTRACAUDAL PRN
Status: DISCONTINUED | OUTPATIENT
Start: 2023-08-23 | End: 2023-08-23 | Stop reason: ALTCHOICE

## 2023-08-23 RX ORDER — SODIUM CHLORIDE, SODIUM LACTATE, POTASSIUM CHLORIDE, CALCIUM CHLORIDE 600; 310; 30; 20 MG/100ML; MG/100ML; MG/100ML; MG/100ML
INJECTION, SOLUTION INTRAVENOUS CONTINUOUS
Status: DISCONTINUED | OUTPATIENT
Start: 2023-08-23 | End: 2023-08-23 | Stop reason: HOSPADM

## 2023-08-23 RX ORDER — OXYCODONE HYDROCHLORIDE 5 MG/1
5 TABLET ORAL PRN
Status: DISCONTINUED | OUTPATIENT
Start: 2023-08-23 | End: 2023-08-23 | Stop reason: HOSPADM

## 2023-08-23 RX ORDER — DEXAMETHASONE SODIUM PHOSPHATE 4 MG/ML
INJECTION, SOLUTION INTRA-ARTICULAR; INTRALESIONAL; INTRAMUSCULAR; INTRAVENOUS; SOFT TISSUE PRN
Status: DISCONTINUED | OUTPATIENT
Start: 2023-08-23 | End: 2023-08-23 | Stop reason: SDUPTHER

## 2023-08-23 RX ORDER — LIDOCAINE HYDROCHLORIDE 20 MG/ML
INJECTION, SOLUTION EPIDURAL; INFILTRATION; INTRACAUDAL; PERINEURAL PRN
Status: DISCONTINUED | OUTPATIENT
Start: 2023-08-23 | End: 2023-08-23 | Stop reason: SDUPTHER

## 2023-08-23 RX ORDER — FENTANYL CITRATE 50 UG/ML
50 INJECTION, SOLUTION INTRAMUSCULAR; INTRAVENOUS EVERY 5 MIN PRN
Status: DISCONTINUED | OUTPATIENT
Start: 2023-08-23 | End: 2023-08-23 | Stop reason: HOSPADM

## 2023-08-23 RX ADMIN — LIDOCAINE HYDROCHLORIDE 100 MG: 20 INJECTION, SOLUTION EPIDURAL; INFILTRATION; INTRACAUDAL; PERINEURAL at 07:54

## 2023-08-23 RX ADMIN — KETOROLAC TROMETHAMINE 30 MG: 30 INJECTION, SOLUTION INTRAMUSCULAR; INTRAVENOUS at 08:00

## 2023-08-23 RX ADMIN — PROPOFOL 150 MG: 10 INJECTION, EMULSION INTRAVENOUS at 07:54

## 2023-08-23 RX ADMIN — SODIUM CHLORIDE, POTASSIUM CHLORIDE, SODIUM LACTATE AND CALCIUM CHLORIDE: 600; 310; 30; 20 INJECTION, SOLUTION INTRAVENOUS at 06:31

## 2023-08-23 RX ADMIN — MIDAZOLAM 2 MG: 1 INJECTION INTRAMUSCULAR; INTRAVENOUS at 07:51

## 2023-08-23 RX ADMIN — DEXAMETHASONE SODIUM PHOSPHATE 4 MG: 4 INJECTION, SOLUTION INTRAMUSCULAR; INTRAVENOUS at 07:54

## 2023-08-23 RX ADMIN — FENTANYL CITRATE 50 MCG: 50 INJECTION, SOLUTION INTRAMUSCULAR; INTRAVENOUS at 08:14

## 2023-08-23 RX ADMIN — ONDANSETRON HYDROCHLORIDE 4 MG: 2 INJECTION INTRAMUSCULAR; INTRAVENOUS at 08:00

## 2023-08-23 RX ADMIN — FENTANYL CITRATE 50 MCG: 50 INJECTION, SOLUTION INTRAMUSCULAR; INTRAVENOUS at 08:00

## 2023-08-23 RX ADMIN — WATER 2000 MG: 1 INJECTION INTRAMUSCULAR; INTRAVENOUS; SUBCUTANEOUS at 08:00

## 2023-08-23 ASSESSMENT — LIFESTYLE VARIABLES: SMOKING_STATUS: 0

## 2023-08-23 ASSESSMENT — PAIN - FUNCTIONAL ASSESSMENT
PAIN_FUNCTIONAL_ASSESSMENT: 0-10

## 2023-08-23 ASSESSMENT — PAIN SCALES - GENERAL
PAINLEVEL_OUTOF10: 0
PAINLEVEL_OUTOF10: 0

## 2023-08-23 NOTE — PERIOP NOTE
TRANSFER - IN REPORT:    Verbal report received from Robi Mccormack RN on [employer]  being received from PACU for routine post-op      Report consisted of patient's Situation, Background, Assessment and   Recommendations(SBAR). Information from the following report(s) Nurse Handoff Report, Adult Overview, Surgery Report, Intake/Output, and MAR was reviewed with the receiving nurse. Opportunity for questions and clarification was provided. Assessment completed upon patient's arrival to unit and care assumed.

## 2023-08-23 NOTE — OP NOTE
condition. Assistant surgeon was needed throughout the procedure for managing bleeding, improving visualization and closure of the surgical wounds.

## 2023-08-23 NOTE — PERIOP NOTE
Discharge instructions reviewed with patient and family. Opportunity for questions and answers given. No further questions at this time. Tolerating po fluids and crackers - denies c/o pain - Written Rx from JAYCE[facility] LOW for pain meds given to pt

## 2023-08-23 NOTE — DISCHARGE INSTRUCTIONS
Non-weight bearing right hand. Keep dressing intact . Do not get dressing wet. Wear dressing with splint until follow up appointment. Take stool softeners daily while taking pain medications, since pain medicines are constipating. Resume pre hospital diet  Drink plenty of fluids  Ambulate in house  Take prescription as directed  Follow up with Dr. Stacie De Jesus as scheduled        DISCHARGE SUMMARY from Nurse    PATIENT INSTRUCTIONS:    After general anesthesia or intravenous sedation, for 24 hours or while taking prescription Narcotics:  Limit your activities  Do not drive and operate hazardous machinery  Do not make important personal or business decisions  Do  not drink alcoholic beverages  If you have not urinated within 8 hours after discharge, please contact your surgeon on call. Report the following to your surgeon:  Excessive pain, swelling, redness or odor of or around the surgical area  Temperature over 100.5  Nausea and vomiting lasting longer than 4 hours or if unable to take medications  Any signs of decreased circulation or nerve impairment to extremity: change in color, persistent  numbness, tingling, coldness or increase pain  Any questions    What to do at Home:  Recommended activity: ambulate in house, no driving while on analgesics, and no heavy lifting until advised    If you experience any of the following symptoms fever, chills, uncontrollable pain, active bleeding or drainage, circulation changes - cold, numb, blue extremities, please follow up with Dr. Stacie De Jesus. *  Please give a list of your current medications to your Primary Care Provider. *  Please update this list whenever your medications are discontinued, doses are      changed, or new medications (including over-the-counter products) are added. *  Please carry medication information at all times in case of emergency situations.     These are general instructions for a healthy lifestyle:    No smoking/ No tobacco

## 2023-08-23 NOTE — PERIOP NOTE
TRANSFER - OUT REPORT:    Verbal report given to [facility] RN  on ROB Dozier  being transferred to phase 2  for routine post-op       Report consisted of patient's Situation, Background, Assessment and   Recommendations(SBAR). Information from the following report(s) Nurse Handoff Report, Adult Overview, Surgery Report, Intake/Output, MAR, and Recent Results was reviewed with the receiving nurse. Lines:   Peripheral IV 08/23/23 Left;Posterior Hand (Active)   Site Assessment Clean, dry & intact 08/23/23 0844   Line Status Infusing 08/23/23 0833   Line Care Connections checked and tightened 08/23/23 0631   Phlebitis Assessment No symptoms 08/23/23 0833   Infiltration Assessment 0 08/23/23 0833   Dressing Status Clean, dry & intact 08/23/23 0833   Dressing Type Transparent 08/23/23 8478        Opportunity for questions and clarification was provided.       Patient transported with:  [employer]

## 2023-08-23 NOTE — PERIOP NOTE
TRANSFER - IN REPORT:    Verbal report received from OR on [employer]  being received from OR for routine post-op      Report consisted of patient's Situation, Background, Assessment and   Recommendations(SBAR). Information from the following report(s) Nurse Handoff Report, Adult Overview, Surgery Report, Intake/Output, MAR, and Alarm Parameters was reviewed with the receiving nurse. Opportunity for questions and clarification was provided. Assessment completed upon patient's arrival to unit and care assumed.

## 2023-08-23 NOTE — INTERVAL H&P NOTE
Update History & Physical    The patient's History and Physica was reviewed with the patient and I examined the patient. There was no change. The surgical site was confirmed by the patient and me. Plan: The risks, benefits, expected outcome, and alternative to the recommended procedure have been discussed with the patient. Patient understands and wants to proceed with the procedure.      Electronically signed by Mert Baca MD on 8/23/2023 at 7:33 AM

## 2023-09-08 PROBLEM — G56.02 LEFT CARPAL TUNNEL SYNDROME: Status: ACTIVE | Noted: 2023-09-08

## 2023-09-08 NOTE — H&P
spaces, no chondrolysis, and no fractures. AP, lateral, and oblique views of the left hand were obtained and interpreted in the office and reveal no periosteal reaction, no medullary lesions, no osteopenia, well-aligned joint spaces, no chondrolysis, and no fractures. Review of her EMG/nerve conduction study [facility] 7/27/23  reveals severe bilateral carpal tunnel syndrome. Impression:   Ms. Lawrence Pacheco continues to have problems with severe left carpal tunnel syndrome     Plan: We discussed treatments for the condition including surgical intervention, the risks, and benefits as well as the different surgical approaches and decision making. We also discussed nonsurgical care for this condition including medications, injections, physical therapy, rehabilitation, activity modification, and brace utilization. At this point, she would like to proceed with left carpal tunnel release. The risks and benefits of this procedure have been described.

## 2023-09-11 ENCOUNTER — ANESTHESIA EVENT (OUTPATIENT)
Facility: HOSPITAL | Age: 33
End: 2023-09-11
Payer: COMMERCIAL

## 2023-09-11 ENCOUNTER — HOSPITAL ENCOUNTER (OUTPATIENT)
Facility: HOSPITAL | Age: 33
Setting detail: OUTPATIENT SURGERY
Discharge: HOME | End: 2023-09-11
Attending: ORTHOPAEDIC SURGERY | Admitting: ORTHOPAEDIC SURGERY
Payer: COMMERCIAL

## 2023-09-11 ENCOUNTER — ANESTHESIA (OUTPATIENT)
Facility: HOSPITAL | Age: 33
End: 2023-09-11
Payer: COMMERCIAL

## 2023-09-11 VITALS
HEART RATE: 56 BPM | OXYGEN SATURATION: 100 % | DIASTOLIC BLOOD PRESSURE: 80 MMHG | RESPIRATION RATE: 18 BRPM | HEIGHT: 62 IN | SYSTOLIC BLOOD PRESSURE: 122 MMHG | WEIGHT: 208 LBS | BODY MASS INDEX: 38.28 KG/M2 | TEMPERATURE: 98 F

## 2023-09-11 DIAGNOSIS — G56.02 LEFT CARPAL TUNNEL SYNDROME: Primary | ICD-10-CM

## 2023-09-11 LAB — HCG UR QL: NEGATIVE

## 2023-09-11 PROCEDURE — 6360000002 HC RX W HCPCS: Performed by: ORTHOPAEDIC SURGERY

## 2023-09-11 PROCEDURE — 7100000000 HC PACU RECOVERY - FIRST 15 MIN: Performed by: ORTHOPAEDIC SURGERY

## 2023-09-11 PROCEDURE — 7100000011 HC PHASE II RECOVERY - ADDTL 15 MIN: Performed by: ORTHOPAEDIC SURGERY

## 2023-09-11 PROCEDURE — 2580000003 HC RX 258: Performed by: ORTHOPAEDIC SURGERY

## 2023-09-11 PROCEDURE — 3700000001 HC ADD 15 MINUTES (ANESTHESIA): Performed by: ORTHOPAEDIC SURGERY

## 2023-09-11 PROCEDURE — 3600000002 HC SURGERY LEVEL 2 BASE: Performed by: ORTHOPAEDIC SURGERY

## 2023-09-11 PROCEDURE — 81025 URINE PREGNANCY TEST: CPT

## 2023-09-11 PROCEDURE — 3700000000 HC ANESTHESIA ATTENDED CARE: Performed by: ORTHOPAEDIC SURGERY

## 2023-09-11 PROCEDURE — 2500000003 HC RX 250 WO HCPCS: Performed by: NURSE ANESTHETIST, CERTIFIED REGISTERED

## 2023-09-11 PROCEDURE — 7100000010 HC PHASE II RECOVERY - FIRST 15 MIN: Performed by: ORTHOPAEDIC SURGERY

## 2023-09-11 PROCEDURE — 3600000012 HC SURGERY LEVEL 2 ADDTL 15MIN: Performed by: ORTHOPAEDIC SURGERY

## 2023-09-11 PROCEDURE — 6360000002 HC RX W HCPCS: Performed by: NURSE ANESTHETIST, CERTIFIED REGISTERED

## 2023-09-11 PROCEDURE — 2709999900 HC NON-CHARGEABLE SUPPLY: Performed by: ORTHOPAEDIC SURGERY

## 2023-09-11 RX ORDER — LABETALOL HYDROCHLORIDE 5 MG/ML
10 INJECTION, SOLUTION INTRAVENOUS
Status: DISCONTINUED | OUTPATIENT
Start: 2023-09-11 | End: 2023-09-11 | Stop reason: HOSPADM

## 2023-09-11 RX ORDER — PROPOFOL 10 MG/ML
INJECTION, EMULSION INTRAVENOUS PRN
Status: DISCONTINUED | OUTPATIENT
Start: 2023-09-11 | End: 2023-09-11 | Stop reason: SDUPTHER

## 2023-09-11 RX ORDER — FENTANYL CITRATE 50 UG/ML
25 INJECTION, SOLUTION INTRAMUSCULAR; INTRAVENOUS EVERY 5 MIN PRN
Status: DISCONTINUED | OUTPATIENT
Start: 2023-09-11 | End: 2023-09-11 | Stop reason: HOSPADM

## 2023-09-11 RX ORDER — DROPERIDOL 2.5 MG/ML
0.62 INJECTION, SOLUTION INTRAMUSCULAR; INTRAVENOUS
Status: DISCONTINUED | OUTPATIENT
Start: 2023-09-11 | End: 2023-09-11 | Stop reason: HOSPADM

## 2023-09-11 RX ORDER — SODIUM CHLORIDE 0.9 % (FLUSH) 0.9 %
5-40 SYRINGE (ML) INJECTION PRN
Status: DISCONTINUED | OUTPATIENT
Start: 2023-09-11 | End: 2023-09-11 | Stop reason: HOSPADM

## 2023-09-11 RX ORDER — ONDANSETRON 2 MG/ML
INJECTION INTRAMUSCULAR; INTRAVENOUS PRN
Status: DISCONTINUED | OUTPATIENT
Start: 2023-09-11 | End: 2023-09-11 | Stop reason: SDUPTHER

## 2023-09-11 RX ORDER — MIDAZOLAM HYDROCHLORIDE 1 MG/ML
INJECTION INTRAMUSCULAR; INTRAVENOUS PRN
Status: DISCONTINUED | OUTPATIENT
Start: 2023-09-11 | End: 2023-09-11 | Stop reason: SDUPTHER

## 2023-09-11 RX ORDER — MEPERIDINE HYDROCHLORIDE 50 MG/ML
12.5 INJECTION INTRAMUSCULAR; INTRAVENOUS; SUBCUTANEOUS ONCE
Status: DISCONTINUED | OUTPATIENT
Start: 2023-09-11 | End: 2023-09-11 | Stop reason: HOSPADM

## 2023-09-11 RX ORDER — OXYCODONE HYDROCHLORIDE 5 MG/1
5 TABLET ORAL
Status: CANCELLED | OUTPATIENT
Start: 2023-09-11 | End: 2023-09-12

## 2023-09-11 RX ORDER — LIDOCAINE HYDROCHLORIDE 20 MG/ML
INJECTION, SOLUTION EPIDURAL; INFILTRATION; INTRACAUDAL; PERINEURAL PRN
Status: DISCONTINUED | OUTPATIENT
Start: 2023-09-11 | End: 2023-09-11 | Stop reason: SDUPTHER

## 2023-09-11 RX ORDER — SODIUM CHLORIDE, SODIUM LACTATE, POTASSIUM CHLORIDE, CALCIUM CHLORIDE 600; 310; 30; 20 MG/100ML; MG/100ML; MG/100ML; MG/100ML
INJECTION, SOLUTION INTRAVENOUS CONTINUOUS
Status: DISCONTINUED | OUTPATIENT
Start: 2023-09-11 | End: 2023-09-11 | Stop reason: HOSPADM

## 2023-09-11 RX ORDER — SODIUM CHLORIDE 9 MG/ML
INJECTION, SOLUTION INTRAVENOUS PRN
Status: DISCONTINUED | OUTPATIENT
Start: 2023-09-11 | End: 2023-09-11 | Stop reason: HOSPADM

## 2023-09-11 RX ORDER — HYDROMORPHONE HYDROCHLORIDE 1 MG/ML
0.5 INJECTION, SOLUTION INTRAMUSCULAR; INTRAVENOUS; SUBCUTANEOUS EVERY 5 MIN PRN
Status: DISCONTINUED | OUTPATIENT
Start: 2023-09-11 | End: 2023-09-11 | Stop reason: HOSPADM

## 2023-09-11 RX ORDER — SODIUM CHLORIDE 0.9 % (FLUSH) 0.9 %
5-40 SYRINGE (ML) INJECTION EVERY 12 HOURS SCHEDULED
Status: DISCONTINUED | OUTPATIENT
Start: 2023-09-11 | End: 2023-09-11 | Stop reason: HOSPADM

## 2023-09-11 RX ORDER — ACETAMINOPHEN AND CODEINE PHOSPHATE 300; 30 MG/1; MG/1
1-2 TABLET ORAL EVERY 6 HOURS PRN
Qty: 30 TABLET | Refills: 0 | Status: SHIPPED | OUTPATIENT
Start: 2023-09-11 | End: 2023-09-15

## 2023-09-11 RX ORDER — IPRATROPIUM BROMIDE AND ALBUTEROL SULFATE 2.5; .5 MG/3ML; MG/3ML
1 SOLUTION RESPIRATORY (INHALATION)
Status: DISCONTINUED | OUTPATIENT
Start: 2023-09-11 | End: 2023-09-11 | Stop reason: HOSPADM

## 2023-09-11 RX ORDER — FENTANYL CITRATE 50 UG/ML
INJECTION, SOLUTION INTRAMUSCULAR; INTRAVENOUS PRN
Status: DISCONTINUED | OUTPATIENT
Start: 2023-09-11 | End: 2023-09-11 | Stop reason: SDUPTHER

## 2023-09-11 RX ORDER — BUPIVACAINE HYDROCHLORIDE 2.5 MG/ML
INJECTION, SOLUTION EPIDURAL; INFILTRATION; INTRACAUDAL PRN
Status: DISCONTINUED | OUTPATIENT
Start: 2023-09-11 | End: 2023-09-11 | Stop reason: ALTCHOICE

## 2023-09-11 RX ORDER — ONDANSETRON 2 MG/ML
4 INJECTION INTRAMUSCULAR; INTRAVENOUS
Status: DISCONTINUED | OUTPATIENT
Start: 2023-09-11 | End: 2023-09-11 | Stop reason: HOSPADM

## 2023-09-11 RX ORDER — DIPHENHYDRAMINE HYDROCHLORIDE 50 MG/ML
12.5 INJECTION INTRAMUSCULAR; INTRAVENOUS
Status: DISCONTINUED | OUTPATIENT
Start: 2023-09-11 | End: 2023-09-11 | Stop reason: HOSPADM

## 2023-09-11 RX ORDER — GLYCOPYRROLATE 0.2 MG/ML
INJECTION INTRAMUSCULAR; INTRAVENOUS PRN
Status: DISCONTINUED | OUTPATIENT
Start: 2023-09-11 | End: 2023-09-11 | Stop reason: SDUPTHER

## 2023-09-11 RX ADMIN — PROPOFOL 30 MG: 10 INJECTION, EMULSION INTRAVENOUS at 07:41

## 2023-09-11 RX ADMIN — GLYCOPYRROLATE 0.1 MG: 0.2 INJECTION INTRAMUSCULAR; INTRAVENOUS at 07:24

## 2023-09-11 RX ADMIN — FENTANYL CITRATE 50 MCG: 50 INJECTION, SOLUTION INTRAMUSCULAR; INTRAVENOUS at 07:24

## 2023-09-11 RX ADMIN — WATER 2000 MG: 1 INJECTION INTRAMUSCULAR; INTRAVENOUS; SUBCUTANEOUS at 07:31

## 2023-09-11 RX ADMIN — FENTANYL CITRATE 25 MCG: 50 INJECTION, SOLUTION INTRAMUSCULAR; INTRAVENOUS at 07:35

## 2023-09-11 RX ADMIN — PROPOFOL 20 MG: 10 INJECTION, EMULSION INTRAVENOUS at 07:36

## 2023-09-11 RX ADMIN — PROPOFOL 20 MG: 10 INJECTION, EMULSION INTRAVENOUS at 07:33

## 2023-09-11 RX ADMIN — PROPOFOL 30 MG: 10 INJECTION, EMULSION INTRAVENOUS at 07:46

## 2023-09-11 RX ADMIN — LIDOCAINE HYDROCHLORIDE 60 MG: 20 INJECTION, SOLUTION EPIDURAL; INFILTRATION; INTRACAUDAL; PERINEURAL at 07:32

## 2023-09-11 RX ADMIN — PROPOFOL 20 MG: 10 INJECTION, EMULSION INTRAVENOUS at 07:38

## 2023-09-11 RX ADMIN — PROPOFOL 40 MG: 10 INJECTION, EMULSION INTRAVENOUS at 07:31

## 2023-09-11 RX ADMIN — PROPOFOL 40 MG: 10 INJECTION, EMULSION INTRAVENOUS at 07:42

## 2023-09-11 RX ADMIN — PROPOFOL 20 MG: 10 INJECTION, EMULSION INTRAVENOUS at 07:35

## 2023-09-11 RX ADMIN — MIDAZOLAM 2 MG: 1 INJECTION INTRAMUSCULAR; INTRAVENOUS at 07:24

## 2023-09-11 RX ADMIN — PROPOFOL 20 MG: 10 INJECTION, EMULSION INTRAVENOUS at 07:39

## 2023-09-11 RX ADMIN — ONDANSETRON HYDROCHLORIDE 4 MG: 2 INJECTION INTRAMUSCULAR; INTRAVENOUS at 07:31

## 2023-09-11 RX ADMIN — FENTANYL CITRATE 25 MCG: 50 INJECTION, SOLUTION INTRAMUSCULAR; INTRAVENOUS at 07:32

## 2023-09-11 RX ADMIN — PROPOFOL 20 MG: 10 INJECTION, EMULSION INTRAVENOUS at 07:37

## 2023-09-11 RX ADMIN — SODIUM CHLORIDE, POTASSIUM CHLORIDE, SODIUM LACTATE AND CALCIUM CHLORIDE: 600; 310; 30; 20 INJECTION, SOLUTION INTRAVENOUS at 06:33

## 2023-09-11 RX ADMIN — PROPOFOL 20 MG: 10 INJECTION, EMULSION INTRAVENOUS at 07:44

## 2023-09-11 ASSESSMENT — PAIN DESCRIPTION - DESCRIPTORS: DESCRIPTORS: SORE

## 2023-09-11 ASSESSMENT — PAIN DESCRIPTION - LOCATION: LOCATION: HAND

## 2023-09-11 ASSESSMENT — PAIN SCALES - GENERAL: PAINLEVEL_OUTOF10: 3

## 2023-09-11 ASSESSMENT — PAIN - FUNCTIONAL ASSESSMENT: PAIN_FUNCTIONAL_ASSESSMENT: 0-10

## 2023-09-11 ASSESSMENT — PAIN DESCRIPTION - ORIENTATION: ORIENTATION: RIGHT

## 2023-09-11 NOTE — PERIOP NOTE
Family updated with progress.
ID band removed and placed in shredder at time of discharge
Reviewed PTA medication list with patient/caregiver and patient/caregiver denies any additional medications. Patient admits to having a responsible adult care for them at home for at least 24 hours after surgery. Patient encouraged to use gown warming system and informed that using said warming gown to regulate body temperature prior to a procedure has been shown to help reduce the risks of blood clots and infection. Patient's pharmacy of choice verified and documented in PTA medication section.     Dual skin assessment & fall risk band verification completed with Karina HOLT RN.
Reviewed discharge plan of care with patient and he family member. Written instructions provided as well.  They verbalized understanding
TRANSFER - IN REPORT:    Verbal report received from [facility] and [address], RN on [employer]  being received from OR for routine progression of patient care      Report consisted of patient's Situation, Background, Assessment and   Recommendations(SBAR). Information from the following report(s) Nurse Handoff Report, Surgery Report, Intake/Output, and MAR was reviewed with the receiving nurse. Opportunity for questions and clarification was provided. Assessment completed upon patient's arrival to unit and care assumed.
TRANSFER - IN REPORT:    Verbal report received from Matthew madden RN on [employer]  being received from  for routine progression of patient care      Report consisted of patient's Situation, Background, Assessment and   Recommendations(SBAR). Information from the following report(s) Surgery Report was reviewed with the receiving nurse. Opportunity for questions and clarification was provided. Assessment completed upon patient's arrival to unit and care assumed.
TRANSFER - OUT REPORT:    Verbal report given to Sancho Fritz RN on Aden Jayden  being transferred to OR for routine progression of patient care       Report consisted of patient's Situation, Background, Assessment and   Recommendations(SBAR). Information from the following report(s) Nurse Handoff Report, Surgery Report, Intake/Output, and MAR was reviewed with the receiving nurse. Lines:   Peripheral IV 09/11/23 Right;Ventral Forearm (Active)   Site Assessment Clean, dry & intact 09/11/23 0758   Line Status Infusing 09/11/23 0758   Line Care Connections checked and tightened 09/11/23 0758   Phlebitis Assessment No symptoms 09/11/23 0758   Infiltration Assessment 0 09/11/23 0758   Alcohol Cap Used No 09/11/23 0758   Dressing Status Clean, dry & intact 09/11/23 0758   Dressing Type Transparent 09/11/23 0758        Opportunity for questions and clarification was provided.       Patient transported with:  Registered Nurse
Urine pregnancy results negative and verified with Trupti Mota RN.
05-Aug-2017 10:39

## 2023-09-11 NOTE — INTERVAL H&P NOTE
Update History & Physical    The patient's History and Physical was reviewed with the patient and I examined the patient. There was no change. The surgical site was confirmed by the patient and me. Plan: The risks, benefits, expected outcome, and alternative to the recommended procedure have been discussed with the patient. Patient understands and wants to proceed with the procedure.      Electronically signed by Paris Rivas MD on 9/11/2023 at 7:17 AM

## 2023-09-11 NOTE — OP NOTE
Patient: Crow Benson MRN: [id number]  SSN: [id number]    YOB: 1990  Age: 34 y.o. Sex: female        Date of Procedure: [unfilled]   Preoperative Diagnosis: Carpal tunnel syndrome on left [G56.02]  Postoperative Diagnosis: * No post-op diagnosis entered *    Procedure: Procedure(s):  LEFT CARPAL TUNNEL RELEASE  Surgeon(s) and Role:     * Berry Doran MD - Primary  Assistant: Alvino Mills PA-C  OR Assitance: Scrub Person First: Lynn Hernandez  Physician Assistant: DEVANTE Beck  Anesthesia: Monitor Anesthesia Care   Estimated Blood Loss: minimal  Fluids: 1000cc  Specimens: * No specimens in log *   Findings: same  Complications: none  Implants: * No implants in log *      Indications: This is a 34y.o. year-old female who presents with significant left   hand pain, worsening ability to do activities of daily living. EMG nerve   conduction study revealed severe carpal tunnel syndrome. Having failed   conservative care, comes for operative intervention. DESCRIPTION OF PROCEDURE: After correct identification, the patient was   taken to the operating room, placed supine on the table, general   endotracheal anesthesia induced, 1 gram of Ancef given. The upper   extremity was prepped and draped in the usual sterile fashion. An Esmarch   was used to exsanguinate the limb, as well as the tourniquet. A 1 inch incision was made in line with the 4th ray with a #15 blade. Blunt disection was taken down the the transverse carpal ligament. A #15 blade then used to transect the transverse carpal ligament into the distal aspect of the forearm. The median   nerve was easily identified and protected using freer elevator. This provided excellent decompression of the median nerve. The wound was then irrigated. Skin closed with 3-0 nylon sutures. Sterile   dressing and splint were applied. The patient tolerated the procedure well,   taken to the recovery room in good condition.      Assistant

## 2023-09-11 NOTE — ANESTHESIA POSTPROCEDURE EVALUATION
Department of Anesthesiology  Postprocedure Note    Patient: Maico Coello  MRN: [id number]  YOB: 1990  Date of evaluation: 9/11/2023      Procedure Summary     Date: 09/11/23 Room / Location: THE [facility] 08 / [facility] MAIN OR    Anesthesia Start: 1393 Anesthesia Stop: 6544    Procedure: LEFT CARPAL TUNNEL RELEASE (Left: Hand) Diagnosis:       Carpal tunnel syndrome on left      (Carpal tunnel syndrome on left [G56.02])    Surgeons: Viji Montero MD Responsible Provider: Lamar Quintero MD    Anesthesia Type: MAC ASA Status: 2          Anesthesia Type: MAC    Mercedes Phase I: Mercedes Score: 8    Mercedes Phase II:        Anesthesia Post Evaluation    Patient location during evaluation: PACU  Patient participation: complete - patient participated  Level of consciousness: awake  Airway patency: patent  Nausea & Vomiting: no nausea and no vomiting  Complications: no  Cardiovascular status: hemodynamically stable  Respiratory status: acceptable  Hydration status: stable  Pain management: satisfactory to patient

## 2024-07-13 ENCOUNTER — HOSPITAL ENCOUNTER (OUTPATIENT)
Facility: HOSPITAL | Age: 34
Setting detail: OBSERVATION
Discharge: HOME | End: 2024-07-13
Attending: STUDENT IN AN ORGANIZED HEALTH CARE EDUCATION/TRAINING PROGRAM | Admitting: STUDENT IN AN ORGANIZED HEALTH CARE EDUCATION/TRAINING PROGRAM
Payer: COMMERCIAL

## 2024-07-13 VITALS
TEMPERATURE: 98.4 F | RESPIRATION RATE: 20 BRPM | HEART RATE: 78 BPM | OXYGEN SATURATION: 99 % | DIASTOLIC BLOOD PRESSURE: 64 MMHG | SYSTOLIC BLOOD PRESSURE: 130 MMHG

## 2024-07-13 PROBLEM — O47.00 PRETERM UTERINE CONTRACTIONS: Status: ACTIVE | Noted: 2024-07-13

## 2024-07-13 LAB
ABO + RH BLD: NORMAL
AMNIOTEST, POC: NORMAL
APPEARANCE UR: NORMAL
BASOPHILS # BLD: 0 K/UL (ref 0–0.1)
BASOPHILS NFR BLD: 0 % (ref 0–2)
BILIRUB UR QL: NEGATIVE
BLOOD GROUP ANTIBODIES SERPL: NORMAL
COLOR UR: YELLOW
DIFFERENTIAL METHOD BLD: ABNORMAL
EOSINOPHIL # BLD: 0 K/UL (ref 0–0.4)
EOSINOPHIL NFR BLD: 1 % (ref 0–5)
ERYTHROCYTE [DISTWIDTH] IN BLOOD BY AUTOMATED COUNT: 13 % (ref 11.6–14.5)
GLUCOSE UR STRIP.AUTO-MCNC: NEGATIVE MG/DL
HCT VFR BLD AUTO: 32.1 % (ref 35–45)
HGB BLD-MCNC: 10.6 G/DL (ref 12–16)
HGB UR QL STRIP: NEGATIVE
IMM GRANULOCYTES # BLD AUTO: 0 K/UL (ref 0–0.04)
IMM GRANULOCYTES NFR BLD AUTO: 0 % (ref 0–0.5)
KETONES UR QL STRIP.AUTO: NEGATIVE MG/DL
LEUKOCYTE ESTERASE UR QL STRIP.AUTO: NEGATIVE
LYMPHOCYTES # BLD: 1.7 K/UL (ref 0.9–3.6)
LYMPHOCYTES NFR BLD: 21 % (ref 21–52)
Lab: NORMAL
MCH RBC QN AUTO: 29.3 PG (ref 24–34)
MCHC RBC AUTO-ENTMCNC: 33 G/DL (ref 31–37)
MCV RBC AUTO: 88.7 FL (ref 78–100)
MONOCYTES # BLD: 0.5 K/UL (ref 0.05–1.2)
MONOCYTES NFR BLD: 6 % (ref 3–10)
NEGATIVE QC PASS/FAIL: NORMAL
NEUTS SEG # BLD: 5.8 K/UL (ref 1.8–8)
NEUTS SEG NFR BLD: 72 % (ref 40–73)
NITRITE UR QL STRIP.AUTO: NEGATIVE
NRBC # BLD: 0 K/UL (ref 0–0.01)
NRBC BLD-RTO: 0 PER 100 WBC
PH UR STRIP: 8 (ref 5–8)
PLATELET # BLD AUTO: 173 K/UL (ref 135–420)
PMV BLD AUTO: 11 FL (ref 9.2–11.8)
POSITIVE QC PASS/FAIL: NORMAL
PROT UR STRIP-MCNC: NEGATIVE MG/DL
RBC # BLD AUTO: 3.62 M/UL (ref 4.2–5.3)
SP GR UR REFRACTOMETRY: 1.01 (ref 1–1.03)
SPECIMEN EXP DATE BLD: NORMAL
UROBILINOGEN UR QL STRIP.AUTO: 1 EU/DL (ref 0.2–1)
WBC # BLD AUTO: 8.1 K/UL (ref 4.6–13.2)

## 2024-07-13 PROCEDURE — 81003 URINALYSIS AUTO W/O SCOPE: CPT

## 2024-07-13 PROCEDURE — 87491 CHLMYD TRACH DNA AMP PROBE: CPT

## 2024-07-13 PROCEDURE — 96372 THER/PROPH/DIAG INJ SC/IM: CPT

## 2024-07-13 PROCEDURE — 96361 HYDRATE IV INFUSION ADD-ON: CPT

## 2024-07-13 PROCEDURE — 86850 RBC ANTIBODY SCREEN: CPT

## 2024-07-13 PROCEDURE — G0378 HOSPITAL OBSERVATION PER HR: HCPCS

## 2024-07-13 PROCEDURE — 86901 BLOOD TYPING SEROLOGIC RH(D): CPT

## 2024-07-13 PROCEDURE — 85025 COMPLETE CBC W/AUTO DIFF WBC: CPT

## 2024-07-13 PROCEDURE — 86900 BLOOD TYPING SEROLOGIC ABO: CPT

## 2024-07-13 PROCEDURE — 6360000002 HC RX W HCPCS

## 2024-07-13 PROCEDURE — 2580000003 HC RX 258

## 2024-07-13 PROCEDURE — 6370000000 HC RX 637 (ALT 250 FOR IP)

## 2024-07-13 PROCEDURE — 87081 CULTURE SCREEN ONLY: CPT

## 2024-07-13 PROCEDURE — 87591 N.GONORRHOEAE DNA AMP PROB: CPT

## 2024-07-13 PROCEDURE — 96360 HYDRATION IV INFUSION INIT: CPT

## 2024-07-13 RX ORDER — SODIUM CHLORIDE, SODIUM LACTATE, POTASSIUM CHLORIDE, CALCIUM CHLORIDE 600; 310; 30; 20 MG/100ML; MG/100ML; MG/100ML; MG/100ML
INJECTION, SOLUTION INTRAVENOUS CONTINUOUS
Status: DISCONTINUED | OUTPATIENT
Start: 2024-07-13 | End: 2024-07-13 | Stop reason: HOSPADM

## 2024-07-13 RX ORDER — SODIUM CHLORIDE, SODIUM LACTATE, POTASSIUM CHLORIDE, AND CALCIUM CHLORIDE .6; .31; .03; .02 G/100ML; G/100ML; G/100ML; G/100ML
1000 INJECTION, SOLUTION INTRAVENOUS ONCE
Status: COMPLETED | OUTPATIENT
Start: 2024-07-13 | End: 2024-07-13

## 2024-07-13 RX ORDER — ONDANSETRON 4 MG/1
4 TABLET, ORALLY DISINTEGRATING ORAL EVERY 8 HOURS PRN
Status: DISCONTINUED | OUTPATIENT
Start: 2024-07-13 | End: 2024-07-13 | Stop reason: HOSPADM

## 2024-07-13 RX ORDER — MAGNESIUM SULFATE HEPTAHYDRATE 40 MG/ML
2000 INJECTION, SOLUTION INTRAVENOUS ONCE
Status: DISCONTINUED | OUTPATIENT
Start: 2024-07-13 | End: 2024-07-13

## 2024-07-13 RX ORDER — SODIUM CHLORIDE, SODIUM LACTATE, POTASSIUM CHLORIDE, CALCIUM CHLORIDE 600; 310; 30; 20 MG/100ML; MG/100ML; MG/100ML; MG/100ML
INJECTION, SOLUTION INTRAVENOUS CONTINUOUS
Status: DISCONTINUED | OUTPATIENT
Start: 2024-07-13 | End: 2024-07-13 | Stop reason: SDUPTHER

## 2024-07-13 RX ORDER — MAGNESIUM SULFATE HEPTAHYDRATE 40 MG/ML
4000 INJECTION, SOLUTION INTRAVENOUS ONCE
Status: DISCONTINUED | OUTPATIENT
Start: 2024-07-13 | End: 2024-07-13

## 2024-07-13 RX ORDER — ONDANSETRON 2 MG/ML
4 INJECTION INTRAMUSCULAR; INTRAVENOUS EVERY 6 HOURS PRN
Status: DISCONTINUED | OUTPATIENT
Start: 2024-07-13 | End: 2024-07-13 | Stop reason: HOSPADM

## 2024-07-13 RX ORDER — SODIUM CHLORIDE, SODIUM LACTATE, POTASSIUM CHLORIDE, CALCIUM CHLORIDE 600; 310; 30; 20 MG/100ML; MG/100ML; MG/100ML; MG/100ML
INJECTION, SOLUTION INTRAVENOUS CONTINUOUS
Status: DISCONTINUED | OUTPATIENT
Start: 2024-07-13 | End: 2024-07-13

## 2024-07-13 RX ORDER — ACETAMINOPHEN 500 MG
1000 TABLET ORAL EVERY 4 HOURS PRN
Status: DISCONTINUED | OUTPATIENT
Start: 2024-07-13 | End: 2024-07-13 | Stop reason: HOSPADM

## 2024-07-13 RX ORDER — CALCIUM GLUCONATE 94 MG/ML
1000 INJECTION, SOLUTION INTRAVENOUS PRN
Status: DISCONTINUED | OUTPATIENT
Start: 2024-07-13 | End: 2024-07-13

## 2024-07-13 RX ORDER — BETAMETHASONE SODIUM PHOSPHATE AND BETAMETHASONE ACETATE 3; 3 MG/ML; MG/ML
12 INJECTION, SUSPENSION INTRA-ARTICULAR; INTRALESIONAL; INTRAMUSCULAR; SOFT TISSUE
Status: DISCONTINUED | OUTPATIENT
Start: 2024-07-13 | End: 2024-07-13 | Stop reason: HOSPADM

## 2024-07-13 RX ADMIN — BETAMETHASONE SODIUM PHOSPHATE AND BETAMETHASONE ACETATE 12 MG: 3; 3 INJECTION, SUSPENSION INTRA-ARTICULAR; INTRALESIONAL; INTRAMUSCULAR at 14:51

## 2024-07-13 RX ADMIN — SODIUM CHLORIDE, POTASSIUM CHLORIDE, SODIUM LACTATE AND CALCIUM CHLORIDE 1000 ML: 600; 310; 30; 20 INJECTION, SOLUTION INTRAVENOUS at 12:51

## 2024-07-13 RX ADMIN — SODIUM CHLORIDE, POTASSIUM CHLORIDE, SODIUM LACTATE AND CALCIUM CHLORIDE: 600; 310; 30; 20 INJECTION, SOLUTION INTRAVENOUS at 14:54

## 2024-07-13 RX ADMIN — ACETAMINOPHEN 1000 MG: 500 TABLET ORAL at 14:23

## 2024-07-13 RX ADMIN — SODIUM CHLORIDE, POTASSIUM CHLORIDE, SODIUM LACTATE AND CALCIUM CHLORIDE: 600; 310; 30; 20 INJECTION, SOLUTION INTRAVENOUS at 15:20

## 2024-07-13 ASSESSMENT — PAIN DESCRIPTION - PAIN TYPE: TYPE: ACUTE PAIN

## 2024-07-13 ASSESSMENT — PAIN DESCRIPTION - ONSET: ONSET: PROGRESSIVE

## 2024-07-13 ASSESSMENT — PAIN SCALES - GENERAL: PAINLEVEL_OUTOF10: 6

## 2024-07-13 ASSESSMENT — PAIN - FUNCTIONAL ASSESSMENT
PAIN_FUNCTIONAL_ASSESSMENT: PREVENTS OR INTERFERES SOME ACTIVE ACTIVITIES AND ADLS
PAIN_FUNCTIONAL_ASSESSMENT: ACTIVITIES ARE NOT PREVENTED

## 2024-07-13 ASSESSMENT — PAIN DESCRIPTION - FREQUENCY: FREQUENCY: INTERMITTENT

## 2024-07-13 ASSESSMENT — PAIN DESCRIPTION - ORIENTATION
ORIENTATION: LOWER
ORIENTATION: LOWER

## 2024-07-13 ASSESSMENT — PAIN DESCRIPTION - LOCATION
LOCATION: ABDOMEN;BACK
LOCATION: BACK;PELVIS

## 2024-07-13 ASSESSMENT — PAIN DESCRIPTION - DESCRIPTORS
DESCRIPTORS: CRAMPING;SHARP
DESCRIPTORS: CRAMPING

## 2024-07-13 NOTE — H&P
History & Physical    Name: Jose Dee MRN: [id number]  SSN: [id number]    YOB: 1990  Age: 33 y.o.  Sex: female        Subjective:     Estimated Date of Delivery: 10/7/24  OB History    Para Term  AB Living   3 2 1 1   3   SAB IAB Ectopic Molar Multiple Live Births           1 3      # Outcome Date GA Lbr Booker/2nd Weight Sex Delivery Anes PTL Lv   3 Current            2             1 Term                Ms. Dee is admitted with pregnancy at 27w5d for   contractions . Prenatal course was complicated by  obesity, hx  birth . Please see prenatal records for details.    Past Medical History:   Diagnosis Date    Advance directive discussed with patient 2023    pt denies having    Anxiety     pt states managed by [employer] [facility]    Carpal tunnel syndrome     History of gastric bypass 2019    Mental disorder     Obesity     PCOS (polycystic ovarian syndrome)     Wears glasses 2023     Past Surgical History:   Procedure Laterality Date    CARPAL TUNNEL RELEASE Right 2023    RIGHT CARPAL TUNNEL RELEASE performed by Oli Steele MD at [facility] MAIN OR    CARPAL TUNNEL RELEASE Left 2023    LEFT CARPAL TUNNEL RELEASE performed by Oli Steele MD at [facility] MAIN OR     SECTION  2016     SECTION      GASTRIC BYPASS,OBESE<150CM MONTY-EN-Y  2019    TONSILLECTOMY AND ADENOIDECTOMY  2019    TYMPANOSTOMY TUBE PLACEMENT Bilateral 1998     Social History     Occupational History    Not on file   Tobacco Use    Smoking status: Never     Passive exposure: Never    Smokeless tobacco: Never   Vaping Use    Vaping Use: Never used   Substance and Sexual Activity    Alcohol use: Yes     Alcohol/week: 1.0 standard drink of alcohol     Types: 1 Shots of liquor per week     Comment: 2 mixed drinks every 2 weeks    Drug use: Not Currently     Comment: pt states last time was in high school    Sexual activity: Yes     Family History   Problem Relation

## 2024-07-13 NOTE — PROGRESS NOTES
Called to bedside to discuss plan of care. Patient is still feeling contractions. States that they are better when she gets up to go to the restroom, rating them 5/10 on pain scale. FHT reassuring and appropriate for gestation age. Patient would like to go home due to unsure of childcare. Discussed precautions and when to report back to unit. Patient will return back to unit for second dose of BMZ tomorrow.     Stacey James, KOFFI Hope CNM

## 2024-07-13 NOTE — PROCEDURES
Limited OB ultrasound  Patient: Jose Dee  Date: 24    Gestational age: 27w5d    Indication: Threatened  labor    Fetal presentation: Cephalic  Fetal heart rate: Present, normal 140-150s  SDP: 7.5cm    Cervical length: 4.9cm (Patient had emptied bladder just prior to ultrasound but some urine present in bladder). No dynamic changes with valsalva    Slava Hernandes MD, [facility]  (o) [phone]  Page through [employer]

## 2024-07-13 NOTE — PROGRESS NOTES
1200- Pt arrived to L&D with c/o contractions, G3,P2,L3, 27.5 weeks. Pt of Dr. Hernandes. Pt denies VB. Pt reports she has been \"mini peeing\" herself for the past 3 days. Pt states she is feeling fetal movement. Assisted pt to restroom for urine sample and to change into gown.      1212- Abdomen soft to palpation and non-tender. US and toco applied. Pt states contractions started at 0830 today and have gotten progressively worse as the day has progressed. Pt states \"these contractions feel different than the alejandro em I had last week, much different\". Pt states contractions are rated a 4/10, located at her lower pelvis, radiating to her back, and up her spine. Pt states she last had intercourse last night. Pt reports c/s in 2019 for failed IOL at [facility] at term and a repeat c/s in 2022 at 33 wks for PPROM and twins at [facility]. Pt planning a repeat c/s with this delivery. Pt states baby is \"measuring 2 weeks ahead\".     1221- No fluid noted on bed pad.     1233- Amnisure negative, confirmed by MYLENE Simpson RN.     1235- K. St. Leandra CNM notified of pts arrival, c/o, amnisure negative, EFM, and hx. Orders received for 1 liter LR bolus, CBC, and UA.     1332- Pt reports contraction pain is \"getting worse\" and now rated a 6/10.     1334- K. St. Leandra CNM notified of pts reports of worsening ctx. CNM on way to unit.     1350- K. St. Leandra CNM at bedside. Pt up to restroom to void.     1353- Lab results reviewed by OMAR James CNM. EFM reviewed by UDAY.     1357- OMAR James CNM at bedside for SVE. SVE FT.     1406- RN at bedside continuously adjusting US. Fetal movement audible on EFM. Pt reports feeling fetal movement.     1415- Rn remains at bedside continuously adjusting US. Fetal movement audible on EFM. Pt reports feeling fetal movement.     1418- KANA Henderson RN at bedside adjusting US.     1440- K. St. Leandra CNM consulting with Dr. Cecilio MD. MD actively reviewing EFM.     1510- Dr. Cecilio MD and OMAR James CNM

## 2024-07-14 ENCOUNTER — HOSPITAL ENCOUNTER (OUTPATIENT)
Facility: HOSPITAL | Age: 34
Discharge: HOME | End: 2024-07-14
Attending: STUDENT IN AN ORGANIZED HEALTH CARE EDUCATION/TRAINING PROGRAM | Admitting: STUDENT IN AN ORGANIZED HEALTH CARE EDUCATION/TRAINING PROGRAM
Payer: COMMERCIAL

## 2024-07-14 VITALS
SYSTOLIC BLOOD PRESSURE: 115 MMHG | HEART RATE: 95 BPM | RESPIRATION RATE: 17 BRPM | DIASTOLIC BLOOD PRESSURE: 58 MMHG | OXYGEN SATURATION: 98 % | TEMPERATURE: 98.2 F

## 2024-07-14 PROCEDURE — 99283 EMERGENCY DEPT VISIT LOW MDM: CPT | Performed by: STUDENT IN AN ORGANIZED HEALTH CARE EDUCATION/TRAINING PROGRAM

## 2024-07-14 PROCEDURE — G0379 DIRECT REFER HOSPITAL OBSERV: HCPCS

## 2024-07-14 PROCEDURE — 6360000002 HC RX W HCPCS

## 2024-07-14 PROCEDURE — G0378 HOSPITAL OBSERVATION PER HR: HCPCS

## 2024-07-14 PROCEDURE — 96372 THER/PROPH/DIAG INJ SC/IM: CPT | Performed by: STUDENT IN AN ORGANIZED HEALTH CARE EDUCATION/TRAINING PROGRAM

## 2024-07-14 RX ORDER — BETAMETHASONE SODIUM PHOSPHATE AND BETAMETHASONE ACETATE 3; 3 MG/ML; MG/ML
12 INJECTION, SUSPENSION INTRA-ARTICULAR; INTRALESIONAL; INTRAMUSCULAR; SOFT TISSUE ONCE
Status: COMPLETED | OUTPATIENT
Start: 2024-07-14 | End: 2024-07-14

## 2024-07-14 RX ADMIN — BETAMETHASONE SODIUM PHOSPHATE AND BETAMETHASONE ACETATE 12 MG: 3; 3 INJECTION, SUSPENSION INTRA-ARTICULAR; INTRALESIONAL; INTRAMUSCULAR at 15:08

## 2024-07-14 NOTE — DISCHARGE INSTRUCTIONS
Counting Your Baby's Kicks: Care Instructions  Overview     Counting your baby's kicks is one way your doctor can tell that your baby is healthy. You will probably feel your baby move for the first time between 16 and 22 weeks. The movement may feel like flutters rather than kicks. Your baby may move more at certain times of the day. When you are active, you may notice less kicking than when you are resting. At your prenatal visits, your doctor will ask whether the baby is active.  In your last trimester, your doctor may ask you to count the number of times you feel your baby move.  Follow-up care is a key part of your treatment and safety. Be sure to make and go to all appointments, and call your doctor if you are having problems. It's also a good idea to know your test results and keep a list of the medicines you take.  How do you count fetal kicks?  A common method of checking your baby's movement is to note the length of time it takes to count 10 movements (such as kicks, flutters, or rolls).  Pick your baby's most active time of day to count. This may be any time from morning to evening.  If you don't feel 10 movements in an hour, have something to eat or drink and count for another hour. If you don't feel at least 10 movements in the 2-hour period, call your doctor.  Do not use an at-home Doppler heart monitor in place of counting fetal movements.  When should you call for help?   Call your doctor now or seek immediate medical care if:    You feel fewer than 10 movements in a 2-hour period.     You noticed that your baby has stopped moving or is moving less than normal.   Watch closely for changes in your health, and be sure to contact your doctor if you have any problems.  Where can you learn more?  Go to https://www.[employer].net/patientEd and enter U048 to learn more about \"Counting Your Baby's Kicks: Care Instructions.\"  Current as of: July 10, 2023  Content Version: 14.1  © 0732-2053 Healthwise,

## 2024-07-14 NOTE — PROGRESS NOTES
: Warm hand off received from off-going RNMYLENE (Lito). CNM St. Ramirez at bedside discussing plan of care and discharge.    [id number]: RN remains at bedside, holding EFM on for fetal HR tracing. Per UDAY, 20 minutes of tracing then discharge to home with strict return precautions. Discharge instructions discussed during this time to include return precautions, gestational age expectations,  labor signs and symptoms, as well as reinforcing that patient return to L&D on 2024 between 1415-1430hrs for second BMTX injection. Pt and  agreed and all questions/concerns addressed.    : Patient discharged to home, ambulated off unit accompanied by spouse. Signed consent forms placed in consent drawer for future admission.

## 2024-07-14 NOTE — PROGRESS NOTES
1430- Pt arrived to L&D for second dose of Celestone, G3,P2,L3, 27.6 weeks. Pt of Dr. Hernandes. Pt denies VB or LOF. Pt denies contractions. Pt reports decreased fetal movement, but reports feeling \"bubble taps\".     1449- Fetal movement audible on EFM.     1500- Pt reports feeling fetal movement.     1501- EFM reviewed by KANA Henderson RN. [facility], CN notified of pts arrival and reports of DFM. CNM notified of pts report of feeling fetal movement while being on unit. Order received to dc pt home.     1510- Pt in agreement to dc home. Written and verbal DC instructions given to include daily fetal kick counts, labor precautions. Verbalizes understanding. All questions answered. Up to get dressed.     1512- Pt ambulated off unit in stable condition. Pt discharged and care relinquished at this time.

## 2024-07-15 LAB
C TRACH RRNA SPEC QL NAA+PROBE: NEGATIVE
N GONORRHOEA RRNA SPEC QL NAA+PROBE: NEGATIVE
SPECIMEN SOURCE: NORMAL

## 2024-07-17 LAB
BACTERIA SPEC CULT: NORMAL
SERVICE CMNT-IMP: NORMAL

## 2024-07-24 ENCOUNTER — HOSPITAL ENCOUNTER (OUTPATIENT)
Facility: HOSPITAL | Age: 34
Discharge: HOME | End: 2024-07-25
Attending: STUDENT IN AN ORGANIZED HEALTH CARE EDUCATION/TRAINING PROGRAM | Admitting: STUDENT IN AN ORGANIZED HEALTH CARE EDUCATION/TRAINING PROGRAM
Payer: COMMERCIAL

## 2024-07-24 PROBLEM — Z33.1 IUP (INTRAUTERINE PREGNANCY), INCIDENTAL: Status: ACTIVE | Noted: 2024-07-24

## 2024-07-24 LAB — FIBRONECTIN FETAL VAG QL: NEGATIVE

## 2024-07-24 PROCEDURE — G0379 DIRECT REFER HOSPITAL OBSERV: HCPCS

## 2024-07-24 PROCEDURE — 87086 URINE CULTURE/COLONY COUNT: CPT

## 2024-07-24 PROCEDURE — 82731 ASSAY OF FETAL FIBRONECTIN: CPT

## 2024-07-24 PROCEDURE — G0378 HOSPITAL OBSERVATION PER HR: HCPCS

## 2024-07-24 PROCEDURE — 99282 EMERGENCY DEPT VISIT SF MDM: CPT

## 2024-07-24 PROCEDURE — 59025 FETAL NON-STRESS TEST: CPT

## 2024-07-24 PROCEDURE — 81003 URINALYSIS AUTO W/O SCOPE: CPT

## 2024-07-24 PROCEDURE — 99212 OFFICE O/P EST SF 10 MIN: CPT

## 2024-07-25 VITALS
DIASTOLIC BLOOD PRESSURE: 60 MMHG | TEMPERATURE: 98.9 F | OXYGEN SATURATION: 95 % | WEIGHT: 210 LBS | HEIGHT: 62 IN | HEART RATE: 83 BPM | RESPIRATION RATE: 20 BRPM | SYSTOLIC BLOOD PRESSURE: 119 MMHG | BODY MASS INDEX: 38.64 KG/M2

## 2024-07-25 LAB
APPEARANCE UR: CLEAR
BILIRUB UR QL: NEGATIVE
COLOR UR: YELLOW
GLUCOSE UR QL STRIP.AUTO: 500 MG/DL
KETONES UR-MCNC: NEGATIVE MG/DL
LEUKOCYTE ESTERASE UR QL STRIP: NEGATIVE
NITRITE UR QL: NEGATIVE
PH UR: 6 (ref 5–9)
PROT UR QL: NEGATIVE MG/DL
RBC # UR STRIP: NEGATIVE
SERVICE CMNT-IMP: ABNORMAL
SP GR UR: 1.01 (ref 1–1.02)
UROBILINOGEN UR QL: 0.2 EU/DL (ref 0.2–1)

## 2024-07-25 NOTE — PROGRESS NOTES
Received to triage with c/o contractions. , 29 +2 weeks. Denies VB or LOF. Assisted to bathroom for urine sample. Dr. Hernandes is aware of arrival.      Dr. Hernandes informed ctx pattern and negative FFN. Orders received to check cervix and if closed may DC to home.      Cervix closed/thick and high.  Written and verbal DC instructions given to include daily fetal kick counts,  labor precautions. Verbalizes understanding. All questions answered. Up to get dressed.      Dc'd to home ambulatory.

## 2024-07-25 NOTE — DISCHARGE INSTRUCTIONS
Counting Your Baby's Kicks: Care Instructions  Overview     Counting your baby's kicks is one way your doctor can tell that your baby is healthy. You will probably feel your baby move for the first time between 16 and 22 weeks. The movement may feel like flutters rather than kicks. Your baby may move more at certain times of the day. When you are active, you may notice less kicking than when you are resting. At your prenatal visits, your doctor will ask whether the baby is active.  In your last trimester, your doctor may ask you to count the number of times you feel your baby move.  Follow-up care is a key part of your treatment and safety. Be sure to make and go to all appointments, and call your doctor if you are having problems. It's also a good idea to know your test results and keep a list of the medicines you take.  How do you count fetal kicks?  A common method of checking your baby's movement is to note the length of time it takes to count 10 movements (such as kicks, flutters, or rolls).  Pick your baby's most active time of day to count. This may be any time from morning to evening.  If you don't feel 10 movements in an hour, have something to eat or drink and count for another hour. If you don't feel at least 10 movements in the 2-hour period, call your doctor.  Do not use an at-home Doppler heart monitor in place of counting fetal movements.  When should you call for help?   Call your doctor now or seek immediate medical care if:    You feel fewer than 10 movements in a 2-hour period.     You noticed that your baby has stopped moving or is moving less than normal.   Watch closely for changes in your health, and be sure to contact your doctor if you have any problems.  Where can you learn more?  Go to https://www.[employer].net/patientEd and enter U048 to learn more about \"Counting Your Baby's Kicks: Care Instructions.\"  Current as of: July 10, 2023  Content Version: 14.1  © 7231-9062 Healthwise,

## 2024-07-26 LAB
BACTERIA SPEC CULT: NORMAL
SERVICE CMNT-IMP: NORMAL

## 2024-08-16 ENCOUNTER — HOSPITAL ENCOUNTER (OUTPATIENT)
Facility: HOSPITAL | Age: 34
Discharge: HOME | End: 2024-08-16
Attending: STUDENT IN AN ORGANIZED HEALTH CARE EDUCATION/TRAINING PROGRAM | Admitting: OBSTETRICS & GYNECOLOGY
Payer: COMMERCIAL

## 2024-08-16 PROBLEM — Z3A.32 32 WEEKS GESTATION OF PREGNANCY: Status: ACTIVE | Noted: 2024-08-16

## 2024-08-16 PROCEDURE — 99212 OFFICE O/P EST SF 10 MIN: CPT

## 2024-08-16 PROCEDURE — 59025 FETAL NON-STRESS TEST: CPT

## 2024-08-16 NOTE — PROGRESS NOTES
- Pt is a 34 y.o.  at 32w4d, arrived to L&D triage with c/o of decreased fetal movement. Denies any vaginal bleeding, LOF, or contractions. Patient sees PC for prenatal care.  EFM and TOCO applied, call bell within reach.     - CATHY Nolasco CNM made aware of patient's arrival. Received order for NST. If NST is reactive and patient is feeling movement, received order to discharge home.      - NST reactive. Reviewed strip with MORE Villanueva RN. Patient reports fetal movement.     - Discharge instructions reviewed with patient at this time. Opportunity for questions and clarification was provided. Patient has verbalized understanding.

## 2024-09-03 PROBLEM — O99.019 ANEMIA IN PREGNANCY: Status: ACTIVE | Noted: 2024-09-03

## 2024-09-03 RX ORDER — SODIUM CHLORIDE 9 MG/ML
5-250 INJECTION, SOLUTION INTRAVENOUS PRN
Status: CANCELLED | OUTPATIENT
Start: 2024-09-10

## 2024-09-03 RX ORDER — EPINEPHRINE 1 MG/ML
0.3 INJECTION, SOLUTION, CONCENTRATE INTRAVENOUS PRN
Status: CANCELLED | OUTPATIENT
Start: 2024-09-10

## 2024-09-03 RX ORDER — DIPHENHYDRAMINE HYDROCHLORIDE 50 MG/ML
50 INJECTION INTRAMUSCULAR; INTRAVENOUS
Status: CANCELLED | OUTPATIENT
Start: 2024-09-10

## 2024-09-03 RX ORDER — ALBUTEROL SULFATE 90 UG/1
4 INHALANT RESPIRATORY (INHALATION) PRN
Status: CANCELLED | OUTPATIENT
Start: 2024-09-10

## 2024-09-03 RX ORDER — HEPARIN 100 UNIT/ML
500 SYRINGE INTRAVENOUS PRN
Status: CANCELLED | OUTPATIENT
Start: 2024-09-10

## 2024-09-03 RX ORDER — SODIUM CHLORIDE 9 MG/ML
INJECTION, SOLUTION INTRAVENOUS CONTINUOUS
Status: CANCELLED | OUTPATIENT
Start: 2024-09-10

## 2024-09-03 RX ORDER — ACETAMINOPHEN 325 MG/1
650 TABLET ORAL
Status: CANCELLED | OUTPATIENT
Start: 2024-09-10

## 2024-09-03 RX ORDER — ONDANSETRON 2 MG/ML
8 INJECTION INTRAMUSCULAR; INTRAVENOUS
Status: CANCELLED | OUTPATIENT
Start: 2024-09-10

## 2024-09-09 ENCOUNTER — HOSPITAL ENCOUNTER (OUTPATIENT)
Facility: HOSPITAL | Age: 34
Discharge: HOME | End: 2024-09-09
Attending: STUDENT IN AN ORGANIZED HEALTH CARE EDUCATION/TRAINING PROGRAM | Admitting: STUDENT IN AN ORGANIZED HEALTH CARE EDUCATION/TRAINING PROGRAM
Payer: COMMERCIAL

## 2024-09-09 VITALS — HEART RATE: 80 BPM | SYSTOLIC BLOOD PRESSURE: 110 MMHG | OXYGEN SATURATION: 99 % | DIASTOLIC BLOOD PRESSURE: 54 MMHG

## 2024-09-09 LAB
ALBUMIN SERPL-MCNC: 2.4 G/DL (ref 3.4–5)
ALBUMIN/GLOB SERPL: 0.8 (ref 0.8–1.7)
ALP SERPL-CCNC: 101 U/L (ref 45–117)
ALT SERPL-CCNC: 10 U/L (ref 13–56)
ANION GAP SERPL CALC-SCNC: 8 MMOL/L (ref 3–18)
AST SERPL-CCNC: 14 U/L (ref 10–38)
BASOPHILS # BLD: 0 K/UL (ref 0–0.1)
BASOPHILS NFR BLD: 0 % (ref 0–2)
BILIRUB SERPL-MCNC: 0.5 MG/DL (ref 0.2–1)
BUN SERPL-MCNC: 5 MG/DL (ref 7–18)
BUN/CREAT SERPL: 10 (ref 12–20)
CALCIUM SERPL-MCNC: 8.2 MG/DL (ref 8.5–10.1)
CHLORIDE SERPL-SCNC: 107 MMOL/L (ref 100–111)
CO2 SERPL-SCNC: 22 MMOL/L (ref 21–32)
CREAT SERPL-MCNC: 0.5 MG/DL (ref 0.6–1.3)
CREAT UR-MCNC: 34 MG/DL (ref 30–125)
DIFFERENTIAL METHOD BLD: ABNORMAL
EOSINOPHIL # BLD: 0 K/UL (ref 0–0.4)
EOSINOPHIL NFR BLD: 0 % (ref 0–5)
ERYTHROCYTE [DISTWIDTH] IN BLOOD BY AUTOMATED COUNT: 12.8 % (ref 11.6–14.5)
GLOBULIN SER CALC-MCNC: 3.2 G/DL (ref 2–4)
GLUCOSE SERPL-MCNC: 69 MG/DL (ref 74–99)
HCT VFR BLD AUTO: 32.1 % (ref 35–45)
HGB BLD-MCNC: 10.6 G/DL (ref 12–16)
IMM GRANULOCYTES # BLD AUTO: 0 K/UL (ref 0–0.04)
IMM GRANULOCYTES NFR BLD AUTO: 1 % (ref 0–0.5)
LYMPHOCYTES # BLD: 1.6 K/UL (ref 0.9–3.6)
LYMPHOCYTES NFR BLD: 20 % (ref 21–52)
MCH RBC QN AUTO: 29 PG (ref 24–34)
MCHC RBC AUTO-ENTMCNC: 33 G/DL (ref 31–37)
MCV RBC AUTO: 87.7 FL (ref 78–100)
MONOCYTES # BLD: 0.6 K/UL (ref 0.05–1.2)
MONOCYTES NFR BLD: 7 % (ref 3–10)
NEUTS SEG # BLD: 5.7 K/UL (ref 1.8–8)
NEUTS SEG NFR BLD: 72 % (ref 40–73)
NRBC # BLD: 0 K/UL (ref 0–0.01)
NRBC BLD-RTO: 0 PER 100 WBC
PLATELET # BLD AUTO: 152 K/UL (ref 135–420)
PMV BLD AUTO: 11.3 FL (ref 9.2–11.8)
POTASSIUM SERPL-SCNC: 3.8 MMOL/L (ref 3.5–5.5)
PROT SERPL-MCNC: 5.6 G/DL (ref 6.4–8.2)
PROT UR-MCNC: 7 MG/DL
PROT/CREAT UR-RTO: 0.2
RBC # BLD AUTO: 3.66 M/UL (ref 4.2–5.3)
SODIUM SERPL-SCNC: 137 MMOL/L (ref 136–145)
URATE SERPL-MCNC: 3.5 MG/DL (ref 2.6–7.2)
WBC # BLD AUTO: 7.9 K/UL (ref 4.6–13.2)

## 2024-09-09 PROCEDURE — 99212 OFFICE O/P EST SF 10 MIN: CPT

## 2024-09-09 PROCEDURE — 85025 COMPLETE CBC W/AUTO DIFF WBC: CPT

## 2024-09-09 PROCEDURE — 84550 ASSAY OF BLOOD/URIC ACID: CPT

## 2024-09-09 PROCEDURE — 80053 COMPREHEN METABOLIC PANEL: CPT

## 2024-09-09 PROCEDURE — 36415 COLL VENOUS BLD VENIPUNCTURE: CPT

## 2024-09-09 PROCEDURE — 82570 ASSAY OF URINE CREATININE: CPT

## 2024-09-09 PROCEDURE — 84156 ASSAY OF PROTEIN URINE: CPT

## 2024-09-10 ENCOUNTER — HOSPITAL ENCOUNTER (OUTPATIENT)
Facility: HOSPITAL | Age: 34
Setting detail: INFUSION SERIES
Discharge: HOME | End: 2024-09-10
Payer: COMMERCIAL

## 2024-09-10 VITALS
DIASTOLIC BLOOD PRESSURE: 65 MMHG | HEART RATE: 75 BPM | OXYGEN SATURATION: 99 % | SYSTOLIC BLOOD PRESSURE: 102 MMHG | RESPIRATION RATE: 16 BRPM | TEMPERATURE: 97.3 F

## 2024-09-10 DIAGNOSIS — O99.019 ANTEPARTUM ANEMIA: Primary | ICD-10-CM

## 2024-09-10 DIAGNOSIS — O99.013 ANEMIA DURING PREGNANCY IN THIRD TRIMESTER: ICD-10-CM

## 2024-09-10 PROCEDURE — 96365 THER/PROPH/DIAG IV INF INIT: CPT

## 2024-09-10 PROCEDURE — 96366 THER/PROPH/DIAG IV INF ADDON: CPT

## 2024-09-10 PROCEDURE — 2580000003 HC RX 258: Performed by: STUDENT IN AN ORGANIZED HEALTH CARE EDUCATION/TRAINING PROGRAM

## 2024-09-10 PROCEDURE — 6360000002 HC RX W HCPCS: Performed by: STUDENT IN AN ORGANIZED HEALTH CARE EDUCATION/TRAINING PROGRAM

## 2024-09-10 RX ORDER — HEPARIN 100 UNIT/ML
500 SYRINGE INTRAVENOUS PRN
Status: CANCELLED | OUTPATIENT
Start: 2024-09-13

## 2024-09-10 RX ORDER — SODIUM CHLORIDE 0.9 % (FLUSH) 0.9 %
5-40 SYRINGE (ML) INJECTION PRN
Status: CANCELLED | OUTPATIENT
Start: 2024-09-13

## 2024-09-10 RX ORDER — DIPHENHYDRAMINE HYDROCHLORIDE 50 MG/ML
50 INJECTION INTRAMUSCULAR; INTRAVENOUS
Status: CANCELLED | OUTPATIENT
Start: 2024-09-13

## 2024-09-10 RX ORDER — ALBUTEROL SULFATE 90 UG/1
4 INHALANT RESPIRATORY (INHALATION) PRN
Status: CANCELLED | OUTPATIENT
Start: 2024-09-13

## 2024-09-10 RX ORDER — EPINEPHRINE 1 MG/ML
0.3 INJECTION, SOLUTION, CONCENTRATE INTRAVENOUS PRN
Status: CANCELLED | OUTPATIENT
Start: 2024-09-13

## 2024-09-10 RX ORDER — SODIUM CHLORIDE 9 MG/ML
5-250 INJECTION, SOLUTION INTRAVENOUS PRN
Status: CANCELLED | OUTPATIENT
Start: 2024-09-13

## 2024-09-10 RX ORDER — SODIUM CHLORIDE 0.9 % (FLUSH) 0.9 %
5-40 SYRINGE (ML) INJECTION PRN
Status: DISCONTINUED | OUTPATIENT
Start: 2024-09-10 | End: 2024-09-11 | Stop reason: HOSPADM

## 2024-09-10 RX ORDER — SODIUM CHLORIDE 9 MG/ML
5-250 INJECTION, SOLUTION INTRAVENOUS PRN
Status: DISCONTINUED | OUTPATIENT
Start: 2024-09-10 | End: 2024-09-11 | Stop reason: HOSPADM

## 2024-09-10 RX ORDER — ONDANSETRON 2 MG/ML
8 INJECTION INTRAMUSCULAR; INTRAVENOUS
Status: CANCELLED | OUTPATIENT
Start: 2024-09-13

## 2024-09-10 RX ORDER — SODIUM CHLORIDE 9 MG/ML
INJECTION, SOLUTION INTRAVENOUS CONTINUOUS
Status: CANCELLED | OUTPATIENT
Start: 2024-09-13

## 2024-09-10 RX ORDER — ACETAMINOPHEN 325 MG/1
650 TABLET ORAL
Status: CANCELLED | OUTPATIENT
Start: 2024-09-13

## 2024-09-10 RX ADMIN — SODIUM CHLORIDE 300 MG: 9 INJECTION, SOLUTION INTRAVENOUS at 10:55

## 2024-09-10 RX ADMIN — SODIUM CHLORIDE 25 ML/HR: 0.9 INJECTION, SOLUTION INTRAVENOUS at 10:52

## 2024-09-13 ENCOUNTER — HOSPITAL ENCOUNTER (OUTPATIENT)
Facility: HOSPITAL | Age: 34
Setting detail: INFUSION SERIES
Discharge: HOME | End: 2024-09-13
Payer: COMMERCIAL

## 2024-09-13 VITALS
RESPIRATION RATE: 16 BRPM | HEART RATE: 80 BPM | TEMPERATURE: 97.7 F | DIASTOLIC BLOOD PRESSURE: 67 MMHG | OXYGEN SATURATION: 98 % | SYSTOLIC BLOOD PRESSURE: 99 MMHG

## 2024-09-13 DIAGNOSIS — O99.013 ANEMIA DURING PREGNANCY IN THIRD TRIMESTER: Primary | ICD-10-CM

## 2024-09-13 PROCEDURE — 2580000003 HC RX 258: Performed by: STUDENT IN AN ORGANIZED HEALTH CARE EDUCATION/TRAINING PROGRAM

## 2024-09-13 PROCEDURE — 6360000002 HC RX W HCPCS: Performed by: STUDENT IN AN ORGANIZED HEALTH CARE EDUCATION/TRAINING PROGRAM

## 2024-09-13 PROCEDURE — 96365 THER/PROPH/DIAG IV INF INIT: CPT

## 2024-09-13 RX ORDER — DIPHENHYDRAMINE HYDROCHLORIDE 50 MG/ML
50 INJECTION INTRAMUSCULAR; INTRAVENOUS
OUTPATIENT
Start: 2024-09-14

## 2024-09-13 RX ORDER — SODIUM CHLORIDE 9 MG/ML
5-250 INJECTION, SOLUTION INTRAVENOUS PRN
OUTPATIENT
Start: 2024-09-14

## 2024-09-13 RX ORDER — SODIUM CHLORIDE 0.9 % (FLUSH) 0.9 %
5-40 SYRINGE (ML) INJECTION PRN
Status: DISCONTINUED | OUTPATIENT
Start: 2024-09-13 | End: 2024-09-14 | Stop reason: HOSPADM

## 2024-09-13 RX ORDER — SODIUM CHLORIDE 9 MG/ML
5-250 INJECTION, SOLUTION INTRAVENOUS PRN
Status: DISCONTINUED | OUTPATIENT
Start: 2024-09-13 | End: 2024-09-14 | Stop reason: HOSPADM

## 2024-09-13 RX ORDER — EPINEPHRINE 1 MG/ML
0.3 INJECTION, SOLUTION, CONCENTRATE INTRAVENOUS PRN
OUTPATIENT
Start: 2024-09-14

## 2024-09-13 RX ORDER — SODIUM CHLORIDE 0.9 % (FLUSH) 0.9 %
5-40 SYRINGE (ML) INJECTION PRN
Status: CANCELLED | OUTPATIENT
Start: 2024-09-14

## 2024-09-13 RX ORDER — HEPARIN 100 UNIT/ML
500 SYRINGE INTRAVENOUS PRN
OUTPATIENT
Start: 2024-09-14

## 2024-09-13 RX ORDER — ALBUTEROL SULFATE 90 UG/1
4 INHALANT RESPIRATORY (INHALATION) PRN
OUTPATIENT
Start: 2024-09-14

## 2024-09-13 RX ORDER — ONDANSETRON 2 MG/ML
8 INJECTION INTRAMUSCULAR; INTRAVENOUS
OUTPATIENT
Start: 2024-09-14

## 2024-09-13 RX ORDER — ACETAMINOPHEN 325 MG/1
650 TABLET ORAL
OUTPATIENT
Start: 2024-09-14

## 2024-09-13 RX ORDER — SODIUM CHLORIDE 9 MG/ML
INJECTION, SOLUTION INTRAVENOUS CONTINUOUS
OUTPATIENT
Start: 2024-09-14

## 2024-09-13 RX ADMIN — Medication 10 ML: at 10:45

## 2024-09-13 RX ADMIN — IRON SUCROSE 200 MG: 20 INJECTION, SOLUTION INTRAVENOUS at 10:26

## 2024-09-18 ENCOUNTER — HOSPITAL ENCOUNTER (OUTPATIENT)
Facility: HOSPITAL | Age: 34
Setting detail: INFUSION SERIES
Discharge: HOME | End: 2024-09-18
Payer: COMMERCIAL

## 2024-09-18 VITALS
RESPIRATION RATE: 16 BRPM | DIASTOLIC BLOOD PRESSURE: 65 MMHG | OXYGEN SATURATION: 97 % | TEMPERATURE: 97.7 F | SYSTOLIC BLOOD PRESSURE: 113 MMHG | HEART RATE: 83 BPM

## 2024-09-18 DIAGNOSIS — O99.013 ANEMIA DURING PREGNANCY IN THIRD TRIMESTER: Primary | ICD-10-CM

## 2024-09-18 PROCEDURE — 6360000002 HC RX W HCPCS: Performed by: STUDENT IN AN ORGANIZED HEALTH CARE EDUCATION/TRAINING PROGRAM

## 2024-09-18 PROCEDURE — 2580000003 HC RX 258: Performed by: STUDENT IN AN ORGANIZED HEALTH CARE EDUCATION/TRAINING PROGRAM

## 2024-09-18 PROCEDURE — 96374 THER/PROPH/DIAG INJ IV PUSH: CPT

## 2024-09-18 RX ORDER — SODIUM CHLORIDE 0.9 % (FLUSH) 0.9 %
5-40 SYRINGE (ML) INJECTION PRN
Status: DISCONTINUED | OUTPATIENT
Start: 2024-09-18 | End: 2024-09-19 | Stop reason: HOSPADM

## 2024-09-18 RX ORDER — SODIUM CHLORIDE 9 MG/ML
INJECTION, SOLUTION INTRAVENOUS CONTINUOUS
OUTPATIENT
Start: 2024-09-18

## 2024-09-18 RX ORDER — ALBUTEROL SULFATE 90 UG/1
4 INHALANT RESPIRATORY (INHALATION) PRN
OUTPATIENT
Start: 2024-09-18

## 2024-09-18 RX ORDER — SODIUM CHLORIDE 9 MG/ML
5-250 INJECTION, SOLUTION INTRAVENOUS PRN
OUTPATIENT
Start: 2024-09-18

## 2024-09-18 RX ORDER — ONDANSETRON 2 MG/ML
8 INJECTION INTRAMUSCULAR; INTRAVENOUS
OUTPATIENT
Start: 2024-09-18

## 2024-09-18 RX ORDER — ACETAMINOPHEN 325 MG/1
650 TABLET ORAL
OUTPATIENT
Start: 2024-09-18

## 2024-09-18 RX ORDER — SODIUM CHLORIDE 0.9 % (FLUSH) 0.9 %
5-40 SYRINGE (ML) INJECTION PRN
OUTPATIENT
Start: 2024-09-18

## 2024-09-18 RX ORDER — HEPARIN 100 UNIT/ML
500 SYRINGE INTRAVENOUS PRN
OUTPATIENT
Start: 2024-09-18

## 2024-09-18 RX ORDER — EPINEPHRINE 1 MG/ML
0.3 INJECTION, SOLUTION, CONCENTRATE INTRAVENOUS PRN
OUTPATIENT
Start: 2024-09-18

## 2024-09-18 RX ORDER — DIPHENHYDRAMINE HYDROCHLORIDE 50 MG/ML
50 INJECTION INTRAMUSCULAR; INTRAVENOUS
OUTPATIENT
Start: 2024-09-18

## 2024-09-18 RX ADMIN — Medication 10 ML: at 10:35

## 2024-09-18 RX ADMIN — SODIUM CHLORIDE 200 MG: 9 INJECTION, SOLUTION INTRAVENOUS at 10:18

## 2024-09-27 PROBLEM — O34.219 HISTORY OF CESAREAN DELIVERY, CURRENTLY PREGNANT: Status: ACTIVE | Noted: 2024-09-27

## 2024-09-30 ENCOUNTER — HOSPITAL ENCOUNTER (INPATIENT)
Facility: HOSPITAL | Age: 34
LOS: 2 days | Discharge: HOME | DRG: 540 | End: 2024-10-02
Attending: OBSTETRICS & GYNECOLOGY | Admitting: OBSTETRICS & GYNECOLOGY
Payer: COMMERCIAL

## 2024-09-30 ENCOUNTER — ANESTHESIA (OUTPATIENT)
Facility: HOSPITAL | Age: 34
DRG: 540 | End: 2024-09-30
Payer: COMMERCIAL

## 2024-09-30 ENCOUNTER — ANESTHESIA EVENT (OUTPATIENT)
Facility: HOSPITAL | Age: 34
DRG: 540 | End: 2024-09-30
Payer: COMMERCIAL

## 2024-09-30 DIAGNOSIS — Z98.84 GASTRIC BYPASS STATUS FOR OBESITY: ICD-10-CM

## 2024-09-30 PROBLEM — N73.6: Status: ACTIVE | Noted: 2024-09-30

## 2024-09-30 PROBLEM — O09.899 RUBELLA NON-IMMUNE STATUS, ANTEPARTUM: Status: ACTIVE | Noted: 2024-09-30

## 2024-09-30 PROBLEM — Z28.39 RUBELLA NON-IMMUNE STATUS, ANTEPARTUM: Status: ACTIVE | Noted: 2024-09-30

## 2024-09-30 PROBLEM — O34.83: Status: ACTIVE | Noted: 2024-09-30

## 2024-09-30 LAB
ABO + RH BLD: NORMAL
BLOOD GROUP ANTIBODIES SERPL: NORMAL
ERYTHROCYTE [DISTWIDTH] IN BLOOD BY AUTOMATED COUNT: 14.2 % (ref 11.6–14.5)
HCT VFR BLD AUTO: 34.2 % (ref 35–45)
HGB BLD-MCNC: 11.4 G/DL (ref 12–16)
MCH RBC QN AUTO: 29.5 PG (ref 24–34)
MCHC RBC AUTO-ENTMCNC: 33.3 G/DL (ref 31–37)
MCV RBC AUTO: 88.4 FL (ref 78–100)
NRBC # BLD: 0 K/UL (ref 0–0.01)
NRBC BLD-RTO: 0 PER 100 WBC
PLATELET # BLD AUTO: 140 K/UL (ref 135–420)
PMV BLD AUTO: 11.9 FL (ref 9.2–11.8)
RBC # BLD AUTO: 3.87 M/UL (ref 4.2–5.3)
RPR SER QL: NONREACTIVE
SPECIMEN EXP DATE BLD: NORMAL
WBC # BLD AUTO: 7.2 K/UL (ref 4.6–13.2)

## 2024-09-30 PROCEDURE — 2580000003 HC RX 258: Performed by: OBSTETRICS & GYNECOLOGY

## 2024-09-30 PROCEDURE — 86592 SYPHILIS TEST NON-TREP QUAL: CPT

## 2024-09-30 PROCEDURE — 6370000000 HC RX 637 (ALT 250 FOR IP): Performed by: OBSTETRICS & GYNECOLOGY

## 2024-09-30 PROCEDURE — 7100000000 HC PACU RECOVERY - FIRST 15 MIN: Performed by: OBSTETRICS & GYNECOLOGY

## 2024-09-30 PROCEDURE — 6360000002 HC RX W HCPCS: Performed by: OBSTETRICS & GYNECOLOGY

## 2024-09-30 PROCEDURE — 1100000000 HC RM PRIVATE

## 2024-09-30 PROCEDURE — 88307 TISSUE EXAM BY PATHOLOGIST: CPT

## 2024-09-30 PROCEDURE — 6360000002 HC RX W HCPCS: Performed by: NURSE ANESTHETIST, CERTIFIED REGISTERED

## 2024-09-30 PROCEDURE — 59025 FETAL NON-STRESS TEST: CPT

## 2024-09-30 PROCEDURE — 7100000001 HC PACU RECOVERY - ADDTL 15 MIN

## 2024-09-30 PROCEDURE — 3700000000 HC ANESTHESIA ATTENDED CARE: Performed by: OBSTETRICS & GYNECOLOGY

## 2024-09-30 PROCEDURE — 3700000001 HC ADD 15 MINUTES (ANESTHESIA): Performed by: OBSTETRICS & GYNECOLOGY

## 2024-09-30 PROCEDURE — 86900 BLOOD TYPING SEROLOGIC ABO: CPT

## 2024-09-30 PROCEDURE — 2709999900 HC NON-CHARGEABLE SUPPLY: Performed by: OBSTETRICS & GYNECOLOGY

## 2024-09-30 PROCEDURE — 4A1HXCZ MONITORING OF PRODUCTS OF CONCEPTION, CARDIAC RATE, EXTERNAL APPROACH: ICD-10-PCS | Performed by: OBSTETRICS & GYNECOLOGY

## 2024-09-30 PROCEDURE — 7100000000 HC PACU RECOVERY - FIRST 15 MIN

## 2024-09-30 PROCEDURE — 85027 COMPLETE CBC AUTOMATED: CPT

## 2024-09-30 PROCEDURE — 2500000003 HC RX 250 WO HCPCS: Performed by: OBSTETRICS & GYNECOLOGY

## 2024-09-30 PROCEDURE — 3609079900 HC CESAREAN SECTION: Performed by: OBSTETRICS & GYNECOLOGY

## 2024-09-30 PROCEDURE — 7100000001 HC PACU RECOVERY - ADDTL 15 MIN: Performed by: OBSTETRICS & GYNECOLOGY

## 2024-09-30 PROCEDURE — 86850 RBC ANTIBODY SCREEN: CPT

## 2024-09-30 PROCEDURE — 86901 BLOOD TYPING SEROLOGIC RH(D): CPT

## 2024-09-30 RX ORDER — FERROUS SULFATE 325(65) MG
325 TABLET ORAL EVERY OTHER DAY
Status: DISCONTINUED | OUTPATIENT
Start: 2024-09-30 | End: 2024-10-02 | Stop reason: HOSPADM

## 2024-09-30 RX ORDER — SODIUM CHLORIDE 9 MG/ML
INJECTION, SOLUTION INTRAVENOUS PRN
Status: DISCONTINUED | OUTPATIENT
Start: 2024-09-30 | End: 2024-10-02 | Stop reason: HOSPADM

## 2024-09-30 RX ORDER — FAMOTIDINE 20 MG/1
20 TABLET, FILM COATED ORAL 2 TIMES DAILY
Status: DISCONTINUED | OUTPATIENT
Start: 2024-09-30 | End: 2024-10-02 | Stop reason: HOSPADM

## 2024-09-30 RX ORDER — SODIUM CHLORIDE 0.9 % (FLUSH) 0.9 %
5-40 SYRINGE (ML) INJECTION EVERY 12 HOURS SCHEDULED
Status: DISCONTINUED | OUTPATIENT
Start: 2024-09-30 | End: 2024-09-30 | Stop reason: HOSPADM

## 2024-09-30 RX ORDER — SODIUM CHLORIDE, SODIUM LACTATE, POTASSIUM CHLORIDE, CALCIUM CHLORIDE 600; 310; 30; 20 MG/100ML; MG/100ML; MG/100ML; MG/100ML
INJECTION, SOLUTION INTRAVENOUS CONTINUOUS
Status: DISCONTINUED | OUTPATIENT
Start: 2024-09-30 | End: 2024-10-02 | Stop reason: HOSPADM

## 2024-09-30 RX ORDER — SODIUM CHLORIDE, SODIUM LACTATE, POTASSIUM CHLORIDE, AND CALCIUM CHLORIDE .6; .31; .03; .02 G/100ML; G/100ML; G/100ML; G/100ML
1000 INJECTION, SOLUTION INTRAVENOUS ONCE
Status: DISCONTINUED | OUTPATIENT
Start: 2024-09-30 | End: 2024-09-30 | Stop reason: HOSPADM

## 2024-09-30 RX ORDER — SODIUM CHLORIDE 9 MG/ML
INJECTION, SOLUTION INTRAVENOUS PRN
Status: DISCONTINUED | OUTPATIENT
Start: 2024-09-30 | End: 2024-09-30 | Stop reason: HOSPADM

## 2024-09-30 RX ORDER — METHYLERGONOVINE MALEATE 0.2 MG/ML
200 INJECTION INTRAVENOUS PRN
Status: DISCONTINUED | OUTPATIENT
Start: 2024-09-30 | End: 2024-10-02 | Stop reason: HOSPADM

## 2024-09-30 RX ORDER — TRANEXAMIC ACID 10 MG/ML
1000 INJECTION, SOLUTION INTRAVENOUS
Status: DISPENSED | OUTPATIENT
Start: 2024-09-30 | End: 2024-10-01

## 2024-09-30 RX ORDER — SODIUM CHLORIDE, SODIUM LACTATE, POTASSIUM CHLORIDE, CALCIUM CHLORIDE 600; 310; 30; 20 MG/100ML; MG/100ML; MG/100ML; MG/100ML
INJECTION, SOLUTION INTRAVENOUS CONTINUOUS
Status: DISCONTINUED | OUTPATIENT
Start: 2024-09-30 | End: 2024-09-30 | Stop reason: HOSPADM

## 2024-09-30 RX ORDER — HYDROMORPHONE HYDROCHLORIDE 1 MG/ML
0.25 INJECTION, SOLUTION INTRAMUSCULAR; INTRAVENOUS; SUBCUTANEOUS
Status: DISCONTINUED | OUTPATIENT
Start: 2024-09-30 | End: 2024-10-02 | Stop reason: HOSPADM

## 2024-09-30 RX ORDER — ACETAMINOPHEN 325 MG/1
975 TABLET ORAL ONCE
Status: COMPLETED | OUTPATIENT
Start: 2024-09-30 | End: 2024-09-30

## 2024-09-30 RX ORDER — PRENATAL WITH FERROUS FUM AND FOLIC ACID 3080; 920; 120; 400; 22; 1.84; 3; 20; 10; 1; 12; 200; 27; 25; 2 [IU]/1; [IU]/1; MG/1; [IU]/1; MG/1; MG/1; MG/1; MG/1; MG/1; MG/1; UG/1; MG/1; MG/1; MG/1; MG/1
1 TABLET ORAL DAILY
Status: DISCONTINUED | OUTPATIENT
Start: 2024-09-30 | End: 2024-10-02 | Stop reason: HOSPADM

## 2024-09-30 RX ORDER — ONDANSETRON 2 MG/ML
4 INJECTION INTRAMUSCULAR; INTRAVENOUS EVERY 6 HOURS PRN
Status: DISCONTINUED | OUTPATIENT
Start: 2024-09-30 | End: 2024-10-02 | Stop reason: HOSPADM

## 2024-09-30 RX ORDER — CARBOPROST TROMETHAMINE 250 UG/ML
250 INJECTION, SOLUTION INTRAMUSCULAR PRN
Status: DISCONTINUED | OUTPATIENT
Start: 2024-09-30 | End: 2024-10-02 | Stop reason: HOSPADM

## 2024-09-30 RX ORDER — SODIUM CHLORIDE 0.9 % (FLUSH) 0.9 %
5-40 SYRINGE (ML) INJECTION EVERY 12 HOURS SCHEDULED
Status: DISCONTINUED | OUTPATIENT
Start: 2024-09-30 | End: 2024-10-02 | Stop reason: HOSPADM

## 2024-09-30 RX ORDER — ONDANSETRON 4 MG/1
4 TABLET, ORALLY DISINTEGRATING ORAL EVERY 8 HOURS PRN
Status: DISCONTINUED | OUTPATIENT
Start: 2024-09-30 | End: 2024-10-02 | Stop reason: HOSPADM

## 2024-09-30 RX ORDER — ACETAMINOPHEN 500 MG
1000 TABLET ORAL ONCE
Status: DISCONTINUED | OUTPATIENT
Start: 2024-09-30 | End: 2024-09-30 | Stop reason: HOSPADM

## 2024-09-30 RX ORDER — HYDROMORPHONE HYDROCHLORIDE 1 MG/ML
0.5 INJECTION, SOLUTION INTRAMUSCULAR; INTRAVENOUS; SUBCUTANEOUS
Status: DISCONTINUED | OUTPATIENT
Start: 2024-09-30 | End: 2024-10-02 | Stop reason: HOSPADM

## 2024-09-30 RX ORDER — SODIUM CHLORIDE 0.9 % (FLUSH) 0.9 %
10 SYRINGE (ML) INJECTION EVERY 12 HOURS SCHEDULED
Status: DISCONTINUED | OUTPATIENT
Start: 2024-09-30 | End: 2024-09-30 | Stop reason: HOSPADM

## 2024-09-30 RX ORDER — KETOROLAC TROMETHAMINE 30 MG/ML
30 INJECTION, SOLUTION INTRAMUSCULAR; INTRAVENOUS EVERY 6 HOURS
Status: COMPLETED | OUTPATIENT
Start: 2024-09-30 | End: 2024-10-01

## 2024-09-30 RX ORDER — SODIUM CHLORIDE 0.9 % (FLUSH) 0.9 %
10 SYRINGE (ML) INJECTION PRN
Status: DISCONTINUED | OUTPATIENT
Start: 2024-09-30 | End: 2024-09-30 | Stop reason: HOSPADM

## 2024-09-30 RX ORDER — MISOPROSTOL 200 UG/1
800 TABLET ORAL PRN
Status: DISCONTINUED | OUTPATIENT
Start: 2024-09-30 | End: 2024-10-02 | Stop reason: HOSPADM

## 2024-09-30 RX ORDER — ACETAMINOPHEN 500 MG
1000 TABLET ORAL EVERY 8 HOURS SCHEDULED
Status: DISCONTINUED | OUTPATIENT
Start: 2024-09-30 | End: 2024-10-02 | Stop reason: HOSPADM

## 2024-09-30 RX ORDER — OXYCODONE HYDROCHLORIDE 5 MG/1
10 TABLET ORAL EVERY 4 HOURS PRN
Status: DISCONTINUED | OUTPATIENT
Start: 2024-09-30 | End: 2024-10-02 | Stop reason: HOSPADM

## 2024-09-30 RX ORDER — DOCUSATE SODIUM 100 MG/1
100 CAPSULE, LIQUID FILLED ORAL 2 TIMES DAILY
Status: DISCONTINUED | OUTPATIENT
Start: 2024-09-30 | End: 2024-10-02 | Stop reason: HOSPADM

## 2024-09-30 RX ORDER — SODIUM CHLORIDE 0.9 % (FLUSH) 0.9 %
5-40 SYRINGE (ML) INJECTION PRN
Status: DISCONTINUED | OUTPATIENT
Start: 2024-09-30 | End: 2024-09-30 | Stop reason: HOSPADM

## 2024-09-30 RX ORDER — IBUPROFEN 400 MG/1
800 TABLET, FILM COATED ORAL EVERY 8 HOURS
Status: DISCONTINUED | OUTPATIENT
Start: 2024-10-01 | End: 2024-09-30

## 2024-09-30 RX ORDER — LANOLIN/MINERAL OIL
LOTION (ML) TOPICAL
Status: DISCONTINUED | OUTPATIENT
Start: 2024-09-30 | End: 2024-10-02 | Stop reason: HOSPADM

## 2024-09-30 RX ORDER — SODIUM CHLORIDE 0.9 % (FLUSH) 0.9 %
5-40 SYRINGE (ML) INJECTION PRN
Status: DISCONTINUED | OUTPATIENT
Start: 2024-09-30 | End: 2024-10-02 | Stop reason: HOSPADM

## 2024-09-30 RX ORDER — BUSPIRONE HYDROCHLORIDE 5 MG/1
15 TABLET ORAL
Status: DISCONTINUED | OUTPATIENT
Start: 2024-09-30 | End: 2024-10-02 | Stop reason: HOSPADM

## 2024-09-30 RX ORDER — OXYCODONE HYDROCHLORIDE 5 MG/1
5 TABLET ORAL EVERY 4 HOURS PRN
Status: DISCONTINUED | OUTPATIENT
Start: 2024-09-30 | End: 2024-10-02 | Stop reason: HOSPADM

## 2024-09-30 RX ORDER — PHENYLEPHRINE HCL IN 0.9% NACL 1 MG/10 ML
SYRINGE (ML) INTRAVENOUS
Status: DISCONTINUED | OUTPATIENT
Start: 2024-09-30 | End: 2024-09-30 | Stop reason: SDUPTHER

## 2024-09-30 RX ORDER — DIPHENHYDRAMINE HCL 25 MG
25 CAPSULE ORAL EVERY 6 HOURS PRN
Status: DISCONTINUED | OUTPATIENT
Start: 2024-09-30 | End: 2024-10-02 | Stop reason: HOSPADM

## 2024-09-30 RX ORDER — ONDANSETRON 2 MG/ML
4 INJECTION INTRAMUSCULAR; INTRAVENOUS EVERY 6 HOURS PRN
Status: DISCONTINUED | OUTPATIENT
Start: 2024-09-30 | End: 2024-09-30 | Stop reason: HOSPADM

## 2024-09-30 RX ORDER — ONDANSETRON 2 MG/ML
INJECTION INTRAMUSCULAR; INTRAVENOUS
Status: DISCONTINUED | OUTPATIENT
Start: 2024-09-30 | End: 2024-09-30 | Stop reason: SDUPTHER

## 2024-09-30 RX ADMIN — WATER 3000 MG: 1 INJECTION INTRAMUSCULAR; INTRAVENOUS; SUBCUTANEOUS at 15:50

## 2024-09-30 RX ADMIN — DOCUSATE SODIUM 100 MG: 100 CAPSULE, LIQUID FILLED ORAL at 20:27

## 2024-09-30 RX ADMIN — KETOROLAC TROMETHAMINE 30 MG: 30 INJECTION, SOLUTION INTRAMUSCULAR; INTRAVENOUS at 23:53

## 2024-09-30 RX ADMIN — SODIUM CHLORIDE, SODIUM LACTATE, POTASSIUM CHLORIDE, AND CALCIUM CHLORIDE: 600; 310; 30; 20 INJECTION, SOLUTION INTRAVENOUS at 16:08

## 2024-09-30 RX ADMIN — ONDANSETRON 4 MG: 2 INJECTION INTRAMUSCULAR; INTRAVENOUS at 16:10

## 2024-09-30 RX ADMIN — ACETAMINOPHEN 975 MG: 325 TABLET ORAL at 14:37

## 2024-09-30 RX ADMIN — ONDANSETRON 4 MG: 2 INJECTION INTRAMUSCULAR; INTRAVENOUS at 18:28

## 2024-09-30 RX ADMIN — Medication 667 ML/HR: at 16:10

## 2024-09-30 RX ADMIN — SODIUM CHLORIDE, PRESERVATIVE FREE 10 ML: 5 INJECTION INTRAVENOUS at 21:00

## 2024-09-30 RX ADMIN — HYDROMORPHONE HYDROCHLORIDE 0.5 MG: 1 INJECTION, SOLUTION INTRAMUSCULAR; INTRAVENOUS; SUBCUTANEOUS at 18:55

## 2024-09-30 RX ADMIN — ACETAMINOPHEN 1000 MG: 500 TABLET ORAL at 22:33

## 2024-09-30 RX ADMIN — FAMOTIDINE 20 MG: 10 INJECTION, SOLUTION INTRAVENOUS at 14:38

## 2024-09-30 RX ADMIN — KETOROLAC TROMETHAMINE 30 MG: 30 INJECTION, SOLUTION INTRAMUSCULAR; INTRAVENOUS at 18:08

## 2024-09-30 RX ADMIN — BUSPIRONE HYDROCHLORIDE 15 MG: 5 TABLET ORAL at 23:38

## 2024-09-30 RX ADMIN — SODIUM CHLORIDE, SODIUM LACTATE, POTASSIUM CHLORIDE, AND CALCIUM CHLORIDE: 600; 310; 30; 20 INJECTION, SOLUTION INTRAVENOUS at 15:42

## 2024-09-30 RX ADMIN — OXYCODONE 5 MG: 5 TABLET ORAL at 20:27

## 2024-09-30 RX ADMIN — SERTRALINE 75 MG: 50 TABLET, FILM COATED ORAL at 23:38

## 2024-09-30 RX ADMIN — Medication 50 MCG: at 15:53

## 2024-09-30 ASSESSMENT — PAIN - FUNCTIONAL ASSESSMENT
PAIN_FUNCTIONAL_ASSESSMENT: ACTIVITIES ARE NOT PREVENTED
PAIN_FUNCTIONAL_ASSESSMENT: ACTIVITIES ARE NOT PREVENTED

## 2024-09-30 ASSESSMENT — PAIN SCALES - GENERAL
PAINLEVEL_OUTOF10: 3
PAINLEVEL_OUTOF10: 7
PAINLEVEL_OUTOF10: 7
PAINLEVEL_OUTOF10: 5
PAINLEVEL_OUTOF10: 4

## 2024-09-30 ASSESSMENT — PAIN DESCRIPTION - DESCRIPTORS
DESCRIPTORS: CRAMPING;SORE
DESCRIPTORS: ACHING
DESCRIPTORS: CRAMPING;SORE
DESCRIPTORS: ACHING;BURNING

## 2024-09-30 ASSESSMENT — PAIN DESCRIPTION - LOCATION
LOCATION: ABDOMEN;INCISION
LOCATION: ABDOMEN
LOCATION: ABDOMEN;INCISION
LOCATION: ABDOMEN

## 2024-09-30 ASSESSMENT — PAIN DESCRIPTION - ORIENTATION
ORIENTATION: MID
ORIENTATION: MID
ORIENTATION: LOWER
ORIENTATION: LOWER

## 2024-09-30 NOTE — ANESTHESIA POSTPROCEDURE EVALUATION
Post-Anesthesia Evaluation and Assessment    Cardiovascular Function/Vital Signs  Visit Vitals  /75   Pulse 57   Temp 96.9 °F (36.1 °C) (Oral)   Resp 16   Ht 1.575 m (5' 2\")   Wt 101.2 kg (223 lb)   SpO2 100%   BMI 40.79 kg/m²       Patient is status post Procedure(s):  REPEAT  SECTION.    Nausea/Vomiting: Controlled.    Postoperative hydration reviewed and adequate.    Pain:      Managed.    Neurological Status:       At baseline.    Mental Status and Level of Consciousness: Arousable.    Pulmonary Status:       Adequate oxygenation and airway patent.    Complications related to anesthesia: None    Patient has met all discharge requirements.    Signed By: Haroon Patel MD    2024

## 2024-09-30 NOTE — OP NOTE
[facility] [address] [address]                               OPERATIVE REPORT    PATIENT:       Jose Dee    MRN:              [id number]  DATE:            2024      PREOPERATIVE DIAGNOSES  1. Intrauterine pregnancy at term.  2. Elective repeat  section.    POSTOPERATIVE DIAGNOSES  1. Intrauterine pregnancy at term.  2. Elective repeat  section.  3.  Pelvic adhesions    PROCEDURE PERFORMED: Repeat low-transverse  section of a viable female  infant. Lysis of adhesions.    Assistant: Circulator: Trupti Henderson RN  Scrub Person First: Kandis Nolasco  Scrub Person Second: Stefania Chatterjee      SURGEON: Imelda Woodruff MD    ANESTHESIA:  Spinal, Dr. Patel    ESTIMATED BLOOD LOSS: 440 mL.    SPECIMEN: Placenta, cord gases, cord blood.    FINDINGS: A viable female infant   delivered from the vertex presentation at 1609, Apgar's 8 and 9 and a weight of 7 lbs. 13 oz. There was meconium stained  amniotic fluid.   The  tubes and ovaries could not be visualized. There were thick adhesions between the upper surface of the uterus and the abdominal wall.    COMPLICATIONS: as above.    DESCRIPTION OF PROCEDURE:  The patient was brought to the operating room and correctly identified.  The patient received 3 g of intravenous  Ancef on call for the operating room.  The patient was then placed in the  sitting position and a spinal was administered without difficulty by Dr. Patel and the CRNA.    The patient was then placed in the supine position with a roll  under her right hip. Sequential compression boots were placed on her legs  and a Mendez catheter was sterilely placed, which drained clear yellow  urine. Fetal heart tones were 140's prior to the start of the procedure.  The  patient was sterilely prepped and draped. The spinal was tested and noted  to be adequate for

## 2024-09-30 NOTE — ANESTHESIA PRE PROCEDURE
Substance Use Topics   • Alcohol use: Not Currently     Alcohol/week: 1.0 standard drink of alcohol     Types: 1 Shots of liquor per week     Comment: 2 mixed drinks every 2 weeks                                Counseling given: Not Answered      Vital Signs (Current):   Vitals:    09/30/24 1309 09/30/24 1314   BP:  122/83   Pulse:  69   Resp:  16   Temp:  98.2 °F (36.8 °C)   TempSrc:  Oral   SpO2:  99%   Weight: 101.2 kg (223 lb)    Height: 1.575 m (5' 2\")                                               BP Readings from Last 3 Encounters:   09/30/24 122/83   09/18/24 113/65   09/13/24 99/67       NPO Status: Time of last liquid consumption: 0300                        Time of last solid consumption: 0300                        Date of last liquid consumption: 09/30/24                        Date of last solid food consumption: 09/30/24    BMI:   Wt Readings from Last 3 Encounters:   09/30/24 101.2 kg (223 lb)   07/24/24 95.3 kg (210 lb)   09/11/23 94.3 kg (208 lb)     Body mass index is 40.79 kg/m².    CBC:   Lab Results   Component Value Date/Time    WBC 7.2 09/30/2024 01:44 PM    RBC 3.87 09/30/2024 01:44 PM    HGB 11.4 09/30/2024 01:44 PM    HCT 34.2 09/30/2024 01:44 PM    MCV 88.4 09/30/2024 01:44 PM    RDW 14.2 09/30/2024 01:44 PM     09/30/2024 01:44 PM       CMP:   Lab Results   Component Value Date/Time     09/09/2024 12:51 PM    K 3.8 09/09/2024 12:51 PM     09/09/2024 12:51 PM    CO2 22 09/09/2024 12:51 PM    BUN 5 09/09/2024 12:51 PM    CREATININE 0.50 09/09/2024 12:51 PM    GFRAA >60 11/24/2020 09:50 AM    AGRATIO 1.0 08/13/2020 11:39 AM    LABGLOM >90 09/09/2024 12:51 PM    LABGLOM >60 08/04/2023 05:10 PM    GLUCOSE 69 09/09/2024 12:51 PM    CALCIUM 8.2 09/09/2024 12:51 PM    BILITOT 0.5 09/09/2024 12:51 PM    ALKPHOS 101 09/09/2024 12:51 PM    ALKPHOS 60 08/13/2020 11:39 AM    AST 14 09/09/2024 12:51 PM    ALT 10 09/09/2024 12:51 PM       POC Tests: No results for input(s):

## 2024-09-30 NOTE — H&P
History & Physical    Name: Jose Dee MRN: [id number]  SSN: [id number]    YOB: 1990  Age: 34 y.o.  Sex: female        Subjective:     Estimated Date of Delivery: 10/7/24  OB History    Para Term  AB Living   3 2 1 1   3   SAB IAB Ectopic Molar Multiple Live Births           1 3      # Outcome Date GA Lbr Booker/2nd Weight Sex Type Anes PTL Lv   3 Current            2A  22 33w1d   M CS-LTranv   FAUZIA   2B  22 33w1d   M CS-LTranv   FAUZIA   1 Term 16 39w2d   M CS-LTranv   FAUZIA       Ms. Dee is admitted with pregnancy at 39w0d for elective repeat  Section. Prenatal course was complicated by  a history of gastric bypass,  obesity, LGA, rubella non-immune.  . Most recent EFW on 24 was 3614 grams. Please see prenatal records for details.    Past Medical History:   Diagnosis Date    Advance directive discussed with patient 2023    pt denies having    Anxiety     pt states managed by [facility]    Carpal tunnel syndrome     History of gastric bypass 2019    Mental disorder     Obesity     PCOS (polycystic ovarian syndrome)     Wears glasses 2023     Past Surgical History:   Procedure Laterality Date    CARPAL TUNNEL RELEASE Right 2023    RIGHT CARPAL TUNNEL RELEASE performed by Oli Steele MD at [facility] MAIN OR    CARPAL TUNNEL RELEASE Left 2023    LEFT CARPAL TUNNEL RELEASE performed by Oli Steele MD at [facility] MAIN OR     SECTION  2016     SECTION      GASTRIC BYPASS,OBESE<150CM MONTY-EN-Y  2019    TONSILLECTOMY AND ADENOIDECTOMY  2019    TYMPANOSTOMY TUBE PLACEMENT Bilateral 1998     Social History     Occupational History    Not on file   Tobacco Use    Smoking status: Never     Passive exposure: Never    Smokeless tobacco: Never   Vaping Use    Vaping status: Never Used   Substance and Sexual Activity    Alcohol use: Not Currently     Alcohol/week: 1.0 standard drink of alcohol     Types: 1 Shots

## 2024-10-01 LAB
ERYTHROCYTE [DISTWIDTH] IN BLOOD BY AUTOMATED COUNT: 14.4 % (ref 11.6–14.5)
HCT VFR BLD AUTO: 29.7 % (ref 35–45)
HGB BLD-MCNC: 9.8 G/DL (ref 12–16)
MCH RBC QN AUTO: 29.3 PG (ref 24–34)
MCHC RBC AUTO-ENTMCNC: 33 G/DL (ref 31–37)
MCV RBC AUTO: 88.9 FL (ref 78–100)
NRBC # BLD: 0 K/UL (ref 0–0.01)
NRBC BLD-RTO: 0 PER 100 WBC
PLATELET # BLD AUTO: 119 K/UL (ref 135–420)
PMV BLD AUTO: 11.9 FL (ref 9.2–11.8)
RBC # BLD AUTO: 3.34 M/UL (ref 4.2–5.3)
WBC # BLD AUTO: 9 K/UL (ref 4.6–13.2)

## 2024-10-01 PROCEDURE — 6370000000 HC RX 637 (ALT 250 FOR IP): Performed by: OBSTETRICS & GYNECOLOGY

## 2024-10-01 PROCEDURE — 85027 COMPLETE CBC AUTOMATED: CPT

## 2024-10-01 PROCEDURE — 6360000002 HC RX W HCPCS: Performed by: OBSTETRICS & GYNECOLOGY

## 2024-10-01 PROCEDURE — 1100000000 HC RM PRIVATE

## 2024-10-01 PROCEDURE — 36415 COLL VENOUS BLD VENIPUNCTURE: CPT

## 2024-10-01 RX ORDER — SIMETHICONE 80 MG
80 TABLET,CHEWABLE ORAL EVERY 6 HOURS PRN
Status: DISCONTINUED | OUTPATIENT
Start: 2024-10-01 | End: 2024-10-02 | Stop reason: HOSPADM

## 2024-10-01 RX ADMIN — SIMETHICONE 80 MG: 80 TABLET, CHEWABLE ORAL at 10:17

## 2024-10-01 RX ADMIN — FAMOTIDINE 20 MG: 20 TABLET, FILM COATED ORAL at 22:29

## 2024-10-01 RX ADMIN — OXYCODONE 10 MG: 5 TABLET ORAL at 22:30

## 2024-10-01 RX ADMIN — KETOROLAC TROMETHAMINE 30 MG: 30 INJECTION, SOLUTION INTRAMUSCULAR; INTRAVENOUS at 05:59

## 2024-10-01 RX ADMIN — ACETAMINOPHEN 1000 MG: 500 TABLET ORAL at 06:04

## 2024-10-01 RX ADMIN — DIPHENHYDRAMINE HYDROCHLORIDE 25 MG: 25 CAPSULE ORAL at 22:29

## 2024-10-01 RX ADMIN — PRENATAL WITH FERROUS FUM AND FOLIC ACID 1 TABLET: 3080; 920; 120; 400; 22; 1.84; 3; 20; 10; 1; 12; 200; 27; 25; 2 TABLET ORAL at 08:37

## 2024-10-01 RX ADMIN — ACETAMINOPHEN 1000 MG: 500 TABLET ORAL at 14:39

## 2024-10-01 RX ADMIN — OXYCODONE 10 MG: 5 TABLET ORAL at 00:29

## 2024-10-01 RX ADMIN — OXYCODONE 5 MG: 5 TABLET ORAL at 14:39

## 2024-10-01 RX ADMIN — DOCUSATE SODIUM 100 MG: 100 CAPSULE, LIQUID FILLED ORAL at 08:37

## 2024-10-01 RX ADMIN — DOCUSATE SODIUM 100 MG: 100 CAPSULE, LIQUID FILLED ORAL at 22:29

## 2024-10-01 RX ADMIN — SERTRALINE 75 MG: 50 TABLET, FILM COATED ORAL at 22:30

## 2024-10-01 RX ADMIN — KETOROLAC TROMETHAMINE 30 MG: 30 INJECTION, SOLUTION INTRAMUSCULAR; INTRAVENOUS at 12:08

## 2024-10-01 RX ADMIN — OXYCODONE 5 MG: 5 TABLET ORAL at 10:16

## 2024-10-01 RX ADMIN — BUSPIRONE HYDROCHLORIDE 15 MG: 5 TABLET ORAL at 22:31

## 2024-10-01 RX ADMIN — SIMETHICONE 80 MG: 80 TABLET, CHEWABLE ORAL at 03:57

## 2024-10-01 RX ADMIN — OXYCODONE 10 MG: 5 TABLET ORAL at 04:32

## 2024-10-01 RX ADMIN — OXYCODONE 10 MG: 5 TABLET ORAL at 18:56

## 2024-10-01 ASSESSMENT — PAIN - FUNCTIONAL ASSESSMENT
PAIN_FUNCTIONAL_ASSESSMENT: ACTIVITIES ARE NOT PREVENTED

## 2024-10-01 ASSESSMENT — PAIN DESCRIPTION - LOCATION
LOCATION: ABDOMEN
LOCATION: INCISION
LOCATION: ABDOMEN;INCISION
LOCATION: ABDOMEN
LOCATION: ABDOMEN;INCISION
LOCATION: ABDOMEN

## 2024-10-01 ASSESSMENT — PAIN DESCRIPTION - ORIENTATION
ORIENTATION: MID
ORIENTATION: ANTERIOR;LOWER
ORIENTATION: LOWER;MID
ORIENTATION: ANTERIOR;LOWER
ORIENTATION: MID

## 2024-10-01 ASSESSMENT — PAIN SCALES - GENERAL
PAINLEVEL_OUTOF10: 5
PAINLEVEL_OUTOF10: 4
PAINLEVEL_OUTOF10: 7
PAINLEVEL_OUTOF10: 4
PAINLEVEL_OUTOF10: 2
PAINLEVEL_OUTOF10: 7
PAINLEVEL_OUTOF10: 2
PAINLEVEL_OUTOF10: 6
PAINLEVEL_OUTOF10: 5

## 2024-10-01 ASSESSMENT — PAIN DESCRIPTION - DESCRIPTORS
DESCRIPTORS: STABBING
DESCRIPTORS: SHARP;STABBING
DESCRIPTORS: STABBING
DESCRIPTORS: STABBING;SORE
DESCRIPTORS: STABBING
DESCRIPTORS: ACHING;BURNING;SHOOTING
DESCRIPTORS: SORE;CRAMPING
DESCRIPTORS: ACHING;BURNING

## 2024-10-01 NOTE — PLAN OF CARE
Problem: Pain  Goal: Verbalizes/displays adequate comfort level or baseline comfort level  Outcome: Progressing  Flowsheets (Taken 9/30/2024 2130)  Verbalizes/displays adequate comfort level or baseline comfort level: Encourage patient to monitor pain and request assistance

## 2024-10-01 NOTE — LACTATION NOTE
This note was copied from a baby's chart.     24 5114   Visit Information   Lactation Consult Visit Type IP Initial Consult   Visit Length 30 minutes   Referral Received From Referred by MD   Reason for Visit Education;Normal  Visit   Breast Feeding History/Assessment   Left Breast Soft   Left Nipple Protrude   Right Nipple Protrude   Right Breast Soft   Breastfeeding History Yes   Longest duration (#) 1   Longest Duration months   Feeding Assessment: Maternal Factors   Position and Latch Provides breast support;With assistance   Signs of Transfer Audible infant swallows;Nutritive sucking   Maternal Response Comfortable;Attentive   Care Plan/Breast Care   Breast Care Lanolin provided     Mom educated on breastfeeding basics--hunger cues, feeding on demand, waking baby if baby sleeps too long between feeds, importance of skin to skin, positioning and latching, risk of pacifier use and supplemental feedings, and importance of rooming in--and use of log sheet. Mom also educated on benefits of breastfeeding for herself and baby. Mom verbalized understanding. No questions at this time.    Infant latched and nursing well.

## 2024-10-01 NOTE — PROGRESS NOTES
Post-Operative  Day 1    Jose Dee     Assessment:   Patient Active Problem List   Diagnosis    Morbid obesity with BMI of 45.0-49.9, adult    S/P emergency  section    Gastric bypass status for obesity    Obesity, morbid     premature rupture of membranes (PPROM) with onset of labor after 24 hours of rupture in first trimester, antepartum    Left carpal tunnel syndrome     uterine contractions    IUP (intrauterine pregnancy), incidental    32 weeks gestation of pregnancy    Anemia during pregnancy    History of  delivery, currently pregnant    Rubella non-immune status, antepartum    Pelvic peritoneal adhesion affecting pregnancy in third trimester    Delivery by elective  section     Post-Op day 1, stable    Plan:   1. Routine post-operative care  2. Post partum activity/wound care advised, diet as tolerated  3. Possible discharge tomorrow.    Information for the patient's :  Chuy Dee [[id number]]   , Low Transverse Patient doing well without significant complaint. Nausea and vomiting resolved, tolerating diet, no flatus, street has been removed but patient has not yet voided.  She is breast feeding without problems.    Current Facility-Administered Medications   Medication Dose Route Frequency Provider Last Rate Last Admin    simethicone (MYLICON) chewable tablet 80 mg  80 mg Oral Q6H PRN Imelda Calvert MD   80 mg at 10/01/24 0357    oxytocin (PITOCIN) 30 units in 500 mL infusion  87.3 amy-units/min IntraVENous Continuous PRN Imelda Calvert MD 50 mL/hr at 24 1635 50 mL/hr at 24 1635    And    oxytocin (PITOCIN) 10 unit bolus from the bag  10 Units IntraVENous PRN Imelda Calvert MD        lactated ringers IV soln infusion   IntraVENous Continuous Imelda Calvert MD        sodium chloride flush 0.9 % injection 5-40 mL  5-40 mL IntraVENous 2 times per day Imelda Calvert MD   10 mL at 24 2100    sodium chloride

## 2024-10-01 NOTE — PLAN OF CARE
Problem: Postpartum  Goal: Experiences normal postpartum course  Description:  Postpartum OB-Pregnancy care plan goal which identifies if the mother is experiencing a normal postpartum course  Outcome: Progressing  Goal: Appropriate maternal -  bonding  Description:  Postpartum OB-Pregnancy care plan goal which identifies if the mother and  are bonding appropriately  Outcome: Progressing  Goal: Establishment of infant feeding pattern  Description:  Postpartum OB-Pregnancy care plan goal which identifies if the mother is establishing a feeding pattern with their   Outcome: Progressing  Goal: Incisions, wounds, or drain sites healing without S/S of infection  Outcome: Progressing     Problem: Pain  Goal: Verbalizes/displays adequate comfort level or baseline comfort level  10/1/2024 0903 by Maite Anne RN  Outcome: Progressing  Flowsheets (Taken 10/1/2024 0432 by Joslyn Glass RN)  Verbalizes/displays adequate comfort level or baseline comfort level: Encourage patient to monitor pain and request assistance  2024 by Joslyn Glass RN  Outcome: Progressing  2024 by Joslyn Glass RN  Outcome: Progressing  Flowsheets (Taken 2024)  Verbalizes/displays adequate comfort level or baseline comfort level: Encourage patient to monitor pain and request assistance     Problem: Infection - Adult  Goal: Absence of infection during hospitalization  Outcome: Progressing     Problem: Safety - Adult  Goal: Free from fall injury  Outcome: Progressing     Problem: Discharge Planning  Goal: Discharge to home or other facility with appropriate resources  Outcome: Progressing     Problem: Chronic Conditions and Co-morbidities  Goal: Patient's chronic conditions and co-morbidity symptoms are monitored and maintained or improved  Outcome: Progressing

## 2024-10-01 NOTE — LACTATION NOTE
Dear Mitchell,     My name is SANTINO Kapadia, and I recently had the pleasure of evaluating information related to your medical condition on our [employer] digital platform. Based on the information available, I have determined that your request for care was unable to be safely and effectively completed. Due to this, it is my recommendation that you seek care at the nearest hospital-based Emergency Department for immediate evaluation and treatment.     Please note that the [facility] department that you submitted your request for care is equivalent to a virtual format of Urgent Care or Immediate Care level of care. If you did not intend to seek this particular level of care and potentially had a scheduled appointment with another provider, we recommend that you directly call the office of the provider you are attempting to meet with to rectify connection issues.     Your care is very important to us. Please do not delay in acting on the recommendations for your care listed above. If you feel you may be experiencing a medical emergency, contact 911 immediately. If you have any questions regarding your visit, you may contact us at [phone].     Thank You,     SANTINO Kapadia    This note was copied from a baby's chart.     10/01/24 0975   Visit Information   Lactation Consult Visit Type IP Consult Follow Up   Visit Length 30 minutes   Referral Received From Lactation Consultant Follow-up   Reason for Visit Education;Normal Tropic Visit   Breast Feeding History/Assessment   Left Breast Soft   Left Nipple Protrude   Right Nipple Protrude   Right Breast Soft   Feeding Assessment: Maternal Factors   Position and Latch With assistance;Independently;Good technique;Provides breast support   Signs of Transfer Nutritive sucking;Audible infant swallows   Maternal Response Relaxed and confident;Attentive;Comfortable; Comfortable with position   Right Side Feeding   Infant Latch Observations Rooting;Wide open mouth;Good latch on;SRS with constant gentle stimulation   Infant Position Skin-to-Skin;Cross cradle   Infant Response to Feeding Feeding well   LATCH Documentation   Latch 2   Audible Swallowing 2   Type of Nipple 2   Comfort (Breast/Nipple) 2   Hold (Positioning) 1   LATCH Score 9         Assisted with breastfeeding, successful latch achieved. Educated on and assisted with proper latch and positioning. Discussed  behaviors and breast/nipple care. Questions addressed. Will remain available.

## 2024-10-02 VITALS
RESPIRATION RATE: 18 BRPM | HEART RATE: 79 BPM | SYSTOLIC BLOOD PRESSURE: 137 MMHG | BODY MASS INDEX: 41.04 KG/M2 | HEIGHT: 62 IN | DIASTOLIC BLOOD PRESSURE: 78 MMHG | WEIGHT: 223 LBS | TEMPERATURE: 97.6 F | OXYGEN SATURATION: 100 %

## 2024-10-02 PROBLEM — Z98.891 S/P EMERGENCY CESAREAN SECTION: Status: RESOLVED | Noted: 2022-02-18 | Resolved: 2024-10-02

## 2024-10-02 PROBLEM — O47.00 PRETERM UTERINE CONTRACTIONS: Status: RESOLVED | Noted: 2024-07-13 | Resolved: 2024-10-02

## 2024-10-02 PROBLEM — Z3A.32 32 WEEKS GESTATION OF PREGNANCY: Status: RESOLVED | Noted: 2024-08-16 | Resolved: 2024-10-02

## 2024-10-02 PROBLEM — O42.111 PRETERM PREMATURE RUPTURE OF MEMBRANES (PPROM) WITH ONSET OF LABOR AFTER 24 HOURS OF RUPTURE IN FIRST TRIMESTER, ANTEPARTUM: Status: RESOLVED | Noted: 2022-02-18 | Resolved: 2024-10-02

## 2024-10-02 PROCEDURE — 90707 MMR VACCINE SC: CPT | Performed by: OBSTETRICS & GYNECOLOGY

## 2024-10-02 PROCEDURE — 6360000002 HC RX W HCPCS: Performed by: OBSTETRICS & GYNECOLOGY

## 2024-10-02 PROCEDURE — 6370000000 HC RX 637 (ALT 250 FOR IP): Performed by: OBSTETRICS & GYNECOLOGY

## 2024-10-02 PROCEDURE — 90471 IMMUNIZATION ADMIN: CPT | Performed by: OBSTETRICS & GYNECOLOGY

## 2024-10-02 RX ORDER — ACETAMINOPHEN 500 MG
1000 TABLET ORAL EVERY 8 HOURS PRN
Qty: 360 TABLET | Refills: 1 | Status: SHIPPED | OUTPATIENT
Start: 2024-10-02

## 2024-10-02 RX ORDER — GABAPENTIN 100 MG/1
100 CAPSULE ORAL EVERY 8 HOURS
Qty: 21 CAPSULE | Refills: 0 | Status: SHIPPED | OUTPATIENT
Start: 2024-10-02 | End: 2024-10-09

## 2024-10-02 RX ORDER — OXYCODONE HYDROCHLORIDE 5 MG/1
5 TABLET ORAL
Qty: 12 TABLET | Refills: 0 | Status: SHIPPED | OUTPATIENT
Start: 2024-10-02 | End: 2024-10-05

## 2024-10-02 RX ADMIN — ACETAMINOPHEN 1000 MG: 500 TABLET ORAL at 02:00

## 2024-10-02 RX ADMIN — DIPHENHYDRAMINE HYDROCHLORIDE 25 MG: 25 CAPSULE ORAL at 07:42

## 2024-10-02 RX ADMIN — PRENATAL WITH FERROUS FUM AND FOLIC ACID 1 TABLET: 3080; 920; 120; 400; 22; 1.84; 3; 20; 10; 1; 12; 200; 27; 25; 2 TABLET ORAL at 09:05

## 2024-10-02 RX ADMIN — DOCUSATE SODIUM 100 MG: 100 CAPSULE, LIQUID FILLED ORAL at 09:05

## 2024-10-02 RX ADMIN — OXYCODONE 10 MG: 5 TABLET ORAL at 03:47

## 2024-10-02 RX ADMIN — MEASLES, MUMPS, AND RUBELLA VIRUS VACCINE LIVE 0.5 ML: 1000; 12500; 1000 INJECTION, POWDER, LYOPHILIZED, FOR SUSPENSION SUBCUTANEOUS at 11:38

## 2024-10-02 RX ADMIN — OXYCODONE 5 MG: 5 TABLET ORAL at 11:38

## 2024-10-02 RX ADMIN — OXYCODONE 10 MG: 5 TABLET ORAL at 07:42

## 2024-10-02 RX ADMIN — ACETAMINOPHEN 1000 MG: 500 TABLET ORAL at 11:37

## 2024-10-02 ASSESSMENT — PAIN DESCRIPTION - LOCATION
LOCATION: ABDOMEN;INCISION
LOCATION: INCISION

## 2024-10-02 ASSESSMENT — PAIN SCALES - GENERAL
PAINLEVEL_OUTOF10: 8
PAINLEVEL_OUTOF10: 6
PAINLEVEL_OUTOF10: 7
PAINLEVEL_OUTOF10: 5
PAINLEVEL_OUTOF10: 4

## 2024-10-02 ASSESSMENT — PAIN DESCRIPTION - DESCRIPTORS
DESCRIPTORS: CRAMPING;SORE
DESCRIPTORS: STABBING;SHARP
DESCRIPTORS: CRAMPING;SORE
DESCRIPTORS: CRAMPING;SORE

## 2024-10-02 ASSESSMENT — PAIN DESCRIPTION - ORIENTATION
ORIENTATION: ANTERIOR
ORIENTATION: LOWER;MID

## 2024-10-02 ASSESSMENT — PAIN - FUNCTIONAL ASSESSMENT
PAIN_FUNCTIONAL_ASSESSMENT: ACTIVITIES ARE NOT PREVENTED

## 2024-10-02 NOTE — PROGRESS NOTES
Post-Operative  Day 2    Jose Dee     Assessment:   Patient Active Problem List   Diagnosis    Morbid obesity with BMI of 45.0-49.9, adult    S/P emergency  section    Gastric bypass status for obesity    Obesity, morbid     premature rupture of membranes (PPROM) with onset of labor after 24 hours of rupture in first trimester, antepartum    Left carpal tunnel syndrome     uterine contractions    IUP (intrauterine pregnancy), incidental    32 weeks gestation of pregnancy    Anemia during pregnancy    History of  delivery, currently pregnant    Rubella non-immune status, antepartum    Pelvic peritoneal adhesion affecting pregnancy in third trimester    Delivery by elective  section     Post-Op day 2, doing well    Plan:   1. Routine postpartum care. Discharge home today  2. Follow up in office in 6 weeks with Imelda Calvert MD  3. Post partum activity/wound care advised, diet as tolerated  4. Discharge Medications: acetaminophen, roxicodone, gabapentin and medications prior to admission  5. Contraception discussed, desires nexplanon. Will place in office.   6. Postpartum analgesia discussed. Unable to do PO ibuprofen because of prior gastric surgery. Will add short course of gabapentin.     Information for the patient's :  Chuy Dee [[id number]]   , Low Transverse Patient doing well without significant complaint. Nausea and vomiting resolved, tolerating liquids, passing flatus, voiding and ambulating without difficulty.    Current Facility-Administered Medications   Medication Dose Route Frequency Provider Last Rate Last Admin    simethicone (MYLICON) chewable tablet 80 mg  80 mg Oral Q6H PRN Imelda Calvert MD   80 mg at 10/01/24 1017    oxytocin (PITOCIN) 30 units in 500 mL infusion  87.3 amy-units/min IntraVENous Continuous PRN Imelda Calvert MD 50 mL/hr at 24 1635 50 mL/hr at 24 1635    And    oxytocin (PITOCIN) 10 unit

## 2024-10-02 NOTE — PLAN OF CARE
Problem: Postpartum  Goal: Experiences normal postpartum course  Description:  Postpartum OB-Pregnancy care plan goal which identifies if the mother is experiencing a normal postpartum course  Outcome: Progressing  Goal: Appropriate maternal -  bonding  Description:  Postpartum OB-Pregnancy care plan goal which identifies if the mother and  are bonding appropriately  Outcome: Progressing  Goal: Establishment of infant feeding pattern  Description:  Postpartum OB-Pregnancy care plan goal which identifies if the mother is establishing a feeding pattern with their   Outcome: Progressing  Goal: Incisions, wounds, or drain sites healing without S/S of infection  Outcome: Progressing  Flowsheets  Taken 10/1/2024 2230 by Carey Costello RN  Incisions, Wounds, or Drain Sites Healing Without Sign and Symptoms of Infection: TWICE DAILY: Assess and document skin integrity  Taken 10/1/2024 1445 by Candy Puckett LPN  Incisions, Wounds, or Drain Sites Healing Without Sign and Symptoms of Infection: TWICE DAILY: Assess and document dressing/incision, wound bed, drain sites and surrounding tissue     Problem: Pain  Goal: Verbalizes/displays adequate comfort level or baseline comfort level  Outcome: Progressing  Flowsheets  Taken 10/1/2024 2230 by Carey Costello RN  Verbalizes/displays adequate comfort level or baseline comfort level:   Encourage patient to monitor pain and request assistance   Assess pain using appropriate pain scale   Administer analgesics based on type and severity of pain and evaluate response   Implement non-pharmacological measures as appropriate and evaluate response   Consider cultural and social influences on pain and pain management   Notify Licensed Independent Practitioner if interventions unsuccessful or patient reports new pain  Taken 10/1/2024 1445 by Candy Puckett LPN  Verbalizes/displays adequate comfort level or baseline comfort level:   Encourage patient to

## 2024-10-02 NOTE — DISCHARGE INSTRUCTIONS
Pain medicine:   Ibuprofen/Motrin 800mg every 8 hours alternating with Acetaminophen/Tylenol 1000mg every 8 hours. (Able to take one or the other every 4 hours). Oxycodone 5mg every 4-6 hours as needed for breakthrough pain.       After Your Delivery (the Postpartum Period): Care Instructions  Overview     After childbirth (postpartum period), your body goes through many changes as you recover. In these weeks after delivery, try to take good care of yourself. Get rest whenever you can and accept help from others.  It may take 4 to 6 weeks to feel like yourself again, and possibly longer if you had a  birth. You may feel sore or very tired as you recover. After delivery, you may continue to have contractions as the uterus returns to the size it was before your pregnancy. You will also have some vaginal bleeding. And you may have pain around the vagina as you heal. Several days after delivery you may also have pain and swelling in your breasts as they fill with milk. There are things you can do at home to help ease these discomforts.  After childbirth, it's common to feel emotional. You may feel irritable, cry easily, and feel happy one minute and sad the next. This is called the \"baby blues.\" Hormone changes are one cause of these emotional changes. These feelings usually get better within a couple of weeks. If they don't, talk to your doctor or midwife.  In the first couple of weeks after you give birth, your doctor or midwife may want to check in with you and make a plan for follow-up care. You will likely have a complete postpartum visit in the first 3 months after delivery. At that time, your doctor or midwife will check on your recovery and see how you're doing. But if you have questions or concerns before then, you can always call your doctor or midwife.  Follow-up care is a key part of your treatment and safety. Be sure to make and go to all appointments, and call your doctor if you are having

## 2024-10-02 NOTE — LACTATION NOTE
This note was copied from a baby's chart.     10/02/24 1200   Visit Information   Lactation Consult Visit Type IP Consult Follow Up   Visit Length 45 minutes   Referral Received From Lactation Consultant Follow-up   Reason for Visit Education   Left Side Feeding   Infant Latch Observations Wide open mouth   Infant Position Football   Infant Response to Feeding Feeding well       Infant latched and nursed with stimulation for 12 minutes. Lactation discharge education completed. Mom verbalized understanding.

## 2024-10-02 NOTE — DISCHARGE SUMMARY
Amount: 30 mg  Qty: 12 tablet, Refills: 0    Comments: Reduce doses taken as pain becomes manageable  Associated Diagnoses: Delivery by elective  section      acetaminophen (TYLENOL) 500 MG tablet Take 2 tablets by mouth every 8 hours as needed for Pain  Qty: 360 tablet, Refills: 1      gabapentin (NEURONTIN) 100 MG capsule Take 1 capsule by mouth every 8 (eight) hours for 7 days. Intended supply: 90 days Max Daily Amount: 300 mg  Qty: 21 capsule, Refills: 0    Associated Diagnoses: Delivery by elective  section; Gastric bypass status for obesity           CONTINUE these medications which have NOT CHANGED    Details   Prenatal MV-Min-Fe Fum-FA-DHA (PRENATAL 1 PO) Take by mouth      sertraline (ZOLOFT) 50 MG tablet 1 tablet at bedtime Indications: anxiety      busPIRone (BUSPAR) 7.5 MG tablet Take 1 tablet by mouth nightly           STOP taking these medications       Doxylamine Succinate, Sleep, (UNISOM PO) Comments:   Reason for Stopping:                Reference my discharge instructions.    Follow up in 4-6 weeks for your routine postpartum visit.     [facility]  [address] , [address]  [place] [address]    Call [phone] to make/confirm your appointment.         Signed By:  Slava Hernandes MD     2024

## (undated) DEVICE — INTENDED FOR TISSUE SEPARATION, AND OTHER PROCEDURES THAT REQUIRE A SHARP SURGICAL BLADE TO PUNCTURE OR CUT.: Brand: BARD-PARKER SAFETY BLADES SIZE 11, STERILE

## (undated) DEVICE — TRUE CONTENT TO BE POPULATED AS PART OF REBRANDING: Brand: ARGYLE

## (undated) DEVICE — SOLUTION IRRIG 1000ML H2O STRL BLT

## (undated) DEVICE — BSMI CSECTION BIRTHING PACK: Brand: MEDLINE INDUSTRIES, INC.

## (undated) DEVICE — PACK PROCEDURE SURG LAPAROSCOPY 17X7 MM BRTRC PRIMUS

## (undated) DEVICE — INTENDED FOR TISSUE SEPARATION, AND OTHER PROCEDURES THAT REQUIRE A SHARP SURGICAL BLADE TO PUNCTURE OR CUT.: Brand: BARD-PARKER ® CARBON RIB-BACK BLADES

## (undated) DEVICE — HEX-LOCKING BLADE ELECTRODE: Brand: EDGE

## (undated) DEVICE — SCISSORS ENDOSCP DIA5MM CRV MPLR CAUT W/ RATCH HNDL

## (undated) DEVICE — GLOVE ORANGE PI 8 1/2   MSG9085

## (undated) DEVICE — LIQUIBAND RAPID ADHESIVE 36/CS 0.8ML: Brand: MEDLINE

## (undated) DEVICE — TROCAR ENDOSCP SHFT L100MM DIA12MM INTEGR STBL ENDOPATH

## (undated) DEVICE — HANDLE PRB DIA5MM HND CTRL PSTL GRP ENDOPATH PRB + II

## (undated) DEVICE — COVER,LIGHT HANDLE,FLX,1/PK: Brand: MEDLINE INDUSTRIES, INC.

## (undated) DEVICE — SUTURE ETHLN SZ 3-0 L18IN NONABSORBABLE BLK PS-2 L19MM 3/8 1669H

## (undated) DEVICE — SPLINT CAST W3INXL15FT SYN DYNACAST

## (undated) DEVICE — PACK PROCEDURE SURG EXTREMITY CUST

## (undated) DEVICE — CURITY NON-ADHERENT STRIPS: Brand: CURITY

## (undated) DEVICE — STRAP,POSITIONING,KNEE/BODY,FOAM,4X60": Brand: MEDLINE

## (undated) DEVICE — STAPLER SKIN SQ 30 ABSRB STPL DISP INSORB ORDER VIA PHONE OR EMAIL

## (undated) DEVICE — BANDAGE,ELASTIC,ESMARK,STERILE,4"X9',LF: Brand: MEDLINE

## (undated) DEVICE — PADDING,UNDERCAST,COTTON, 3X4YD STERILE: Brand: MEDLINE

## (undated) DEVICE — GAUZE SPONGES,8 PLY: Brand: CURITY

## (undated) DEVICE — SHEAR RMFG HARM ACET PLUS 36 -- LAWSON OEM ITEM 308572

## (undated) DEVICE — (D)SUT VCRL + 2-0 27IN SH UD -- DISK FRM MFR

## (undated) DEVICE — SUT VCRL + 3-0 27IN SH VIO --

## (undated) DEVICE — REM POLYHESIVE ADULT PATIENT RETURN ELECTRODE: Brand: VALLEYLAB

## (undated) DEVICE — SUTURE VCRL SZ 0 L36IN ABSRB UD L36MM CT-1 1/2 CIR J946H

## (undated) DEVICE — SUTURE VCRL SZ 2-0 L54IN ABSRB VLT W/O NDL POLYGLACTIN 910 J618H

## (undated) DEVICE — TROCAR ENDOSCP L100MM DIA12MM STBL SL BLDELSS ENDOPATH XCEL

## (undated) DEVICE — SUTURE VCRL SZ 3-0 L27IN ABSRB VLT L26MM SH 1/2 CIR J316H

## (undated) DEVICE — INTENDED FOR TISSUE SEPARATION, AND OTHER PROCEDURES THAT REQUIRE A SHARP SURGICAL BLADE TO PUNCTURE OR CUT.: Brand: BARD-PARKER SAFETY BLADES SIZE 20, STERILE

## (undated) DEVICE — GLOVE SURG SZ 65 THK91MIL LTX FREE SYN POLYISOPRENE

## (undated) DEVICE — GLOVE ORANGE PI 7   MSG9070

## (undated) DEVICE — GARMENT,MEDLINE,DVT,INT,CALF,MED, GEN2: Brand: MEDLINE

## (undated) DEVICE — SUT VCRL + 2-0 27IN SH UD --

## (undated) DEVICE — GOWN,SIRUS,NONRNF,SETINSLV,2XL,18/CS: Brand: MEDLINE

## (undated) DEVICE — STAPLER SKIN L440MM 32MM LNG 12 FIRING B FRM PWR + GRIPPING

## (undated) DEVICE — STAPLE INT WHT BLU G GRN BLK REINF FOR ENDOPATH ECHELON FLX

## (undated) DEVICE — STRIP,CLOSURE,WOUND,MEDI-STRIP,1/2X4: Brand: MEDLINE

## (undated) DEVICE — BLANKET WRM W29.9XL79.1IN UP BODY FORC AIR MISTRAL-AIR

## (undated) DEVICE — GLOVE SURG SZ 7 L11.33IN FNGR THK9.8MIL STRW LTX POLYMER

## (undated) DEVICE — SOLUTION IV 1000ML 0.9% SOD CHL

## (undated) DEVICE — RELOAD STPL L60MM H1-2.6MM MESENTERY THN TISS WHT 6 ROW

## (undated) DEVICE — STAPLER SKIN SQ 30 ABSRB STPL DISP INSORB

## (undated) DEVICE — SUT VCRL + 2-0 36IN CT1 UD --

## (undated) DEVICE — TROCAR ENDOSCP BLDELSS 12X100 MM W/ HNDL STBL SL OPT TIP

## (undated) DEVICE — ELECTRODE PT RET AD L9FT HI MOIST COND ADH HYDRGEL CORDED

## (undated) DEVICE — SUTURE VICRYL + SZ 2-0 L36IN ABSRB UD L36MM CT-1 1/2 CIR VCP945H

## (undated) DEVICE — SUTURE MCRYL SZ 4-0 L27IN ABSRB UD L24MM PS-1 3/8 CIR PRIM Y935H

## (undated) DEVICE — TISSUE RETRIEVAL SYSTEM: Brand: INZII RETRIEVAL SYSTEM

## (undated) DEVICE — 3M™ STERI-STRIP™ COMPOUND BENZOIN TINCTURE 40 BAGS/CARTON 4 CARTONS/CASE C1544: Brand: 3M™ STERI-STRIP™

## (undated) DEVICE — SOLUTION LACTATED RINGERS INJECTION USP

## (undated) DEVICE — SOLUTION SURG PREP 26 CC PURPREP

## (undated) DEVICE — STERILE POLYISOPRENE POWDER-FREE SURGICAL GLOVES: Brand: PROTEXIS

## (undated) DEVICE — TROCAR LAP L100MM DIA5MM BLDELSS W/ STBL SL ENDOPATH XCEL

## (undated) DEVICE — TRAY,URINE METER,100% SILICONE,16FR10ML: Brand: MEDLINE

## (undated) DEVICE — LARGE, DISPOSABLE ALEXIS O C-SECTION PROTECTOR - RETRACTOR: Brand: ALEXIS ® O C-SECTION PROTECTOR - RETRACTOR

## (undated) DEVICE — BLADE ES L2.75IN ELASTOMERIC COAT DURABLE BEND UPTO 90DEG

## (undated) DEVICE — 3L THIN WALL CAN: Brand: CRD

## (undated) DEVICE — RELOAD STPL L60MM H1.5-3.6MM REG TISS BLU GRIPPING SURF B

## (undated) DEVICE — 4-PORT MANIFOLD: Brand: NEPTUNE 2

## (undated) DEVICE — SUTURE VICRYL SZ 1 L36IN ABSRB UD L36MM CT-1 1/2 CIR J947H

## (undated) DEVICE — BNDG,ELSTC,MATRIX,STRL,3"X5YD,LF,HOOK&LP: Brand: MEDLINE

## (undated) DEVICE — SOLUTION IRRIG 1500ML STRL H2O USP POUR PLAS BTL

## (undated) DEVICE — SUT SLK 2-0SH 30IN BLK --

## (undated) DEVICE — SOLUTION IRRIG 500ML 0.9% SOD CHLO USP POUR PLAS BTL

## (undated) DEVICE — 2, DISPOSABLE SUCTION/IRRIGATOR WITH DISPOSABLE TIP: Brand: STRYKEFLOW

## (undated) DEVICE — TAPE ADH W3INXL10YD PLAS TRNSPAR H2O RESIST HYPOALRG CURAD

## (undated) DEVICE — SOL IRRIGATION INJ NACL 0.9% 500ML BTL